# Patient Record
Sex: FEMALE | Race: ASIAN | NOT HISPANIC OR LATINO
[De-identification: names, ages, dates, MRNs, and addresses within clinical notes are randomized per-mention and may not be internally consistent; named-entity substitution may affect disease eponyms.]

---

## 2022-07-12 ENCOUNTER — APPOINTMENT (OUTPATIENT)
Dept: SPINE | Facility: CLINIC | Age: 62
End: 2022-07-12

## 2022-07-12 DIAGNOSIS — M41.20 OTHER IDIOPATHIC SCOLIOSIS, SITE UNSPECIFIED: ICD-10-CM

## 2022-07-12 DIAGNOSIS — M54.12 DISEASE OF SPINAL CORD, UNSPECIFIED: ICD-10-CM

## 2022-07-12 DIAGNOSIS — G95.9 DISEASE OF SPINAL CORD, UNSPECIFIED: ICD-10-CM

## 2022-07-12 DIAGNOSIS — F41.9 ANXIETY DISORDER, UNSPECIFIED: ICD-10-CM

## 2022-07-12 PROBLEM — Z00.00 ENCOUNTER FOR PREVENTIVE HEALTH EXAMINATION: Status: ACTIVE | Noted: 2022-07-12

## 2022-07-12 RX ORDER — KETOCONAZOLE 20 MG/G
2 CREAM TOPICAL
Qty: 60 | Refills: 0 | Status: ACTIVE | COMMUNITY
Start: 2022-06-21

## 2022-07-12 RX ORDER — DICLOFENAC SODIUM 1% 10 MG/G
1 GEL TOPICAL
Qty: 500 | Refills: 0 | Status: ACTIVE | COMMUNITY
Start: 2022-06-13

## 2022-07-12 RX ORDER — EZETIMIBE 10 MG/1
10 TABLET ORAL
Qty: 90 | Refills: 0 | Status: ACTIVE | COMMUNITY
Start: 2022-06-13

## 2022-07-12 RX ORDER — SOLIFENACIN SUCCINATE 5 MG/1
5 TABLET ORAL
Qty: 90 | Refills: 0 | Status: ACTIVE | COMMUNITY
Start: 2022-06-13

## 2022-07-12 RX ORDER — ATORVASTATIN CALCIUM 40 MG/1
40 TABLET, FILM COATED ORAL
Qty: 90 | Refills: 0 | Status: ACTIVE | COMMUNITY
Start: 2022-06-13

## 2022-07-12 RX ORDER — SITAGLIPTIN AND METFORMIN HYDROCHLORIDE 100; 1000 MG/1; MG/1
100-1000 TABLET, FILM COATED, EXTENDED RELEASE ORAL
Qty: 90 | Refills: 0 | Status: ACTIVE | COMMUNITY
Start: 2022-06-13

## 2022-07-12 RX ORDER — HYDROCORTISONE 1 %
12 CREAM (GRAM) TOPICAL
Qty: 400 | Refills: 0 | Status: ACTIVE | COMMUNITY
Start: 2022-06-21

## 2022-07-12 RX ORDER — BRIMONIDINE TARTRATE 1 MG/ML
0.1 SOLUTION/ DROPS OPHTHALMIC
Qty: 15 | Refills: 0 | Status: ACTIVE | COMMUNITY
Start: 2022-06-17

## 2022-07-12 RX ORDER — IRBESARTAN AND HYDROCHLOROTHIAZIDE 300; 12.5 MG/1; MG/1
300-12.5 TABLET ORAL
Qty: 90 | Refills: 0 | Status: ACTIVE | COMMUNITY
Start: 2022-06-13

## 2022-07-12 RX ORDER — DORZOLAMIDE HYDROCHLORIDE 20 MG/ML
2 SOLUTION OPHTHALMIC
Qty: 10 | Refills: 0 | Status: ACTIVE | COMMUNITY
Start: 2022-06-17

## 2022-07-12 RX ORDER — DIAZEPAM 5 MG/1
5 TABLET ORAL
Qty: 4 | Refills: 0 | Status: ACTIVE | COMMUNITY
Start: 2022-07-12 | End: 1900-01-01

## 2022-07-12 RX ORDER — LATANOPROST/PF 0.005 %
0.01 DROPS OPHTHALMIC (EYE)
Qty: 2 | Refills: 0 | Status: ACTIVE | COMMUNITY
Start: 2022-06-17

## 2022-07-12 RX ORDER — AMLODIPINE BESYLATE 5 MG/1
5 TABLET ORAL
Qty: 90 | Refills: 0 | Status: ACTIVE | COMMUNITY
Start: 2022-06-13

## 2022-07-14 DIAGNOSIS — M47.14 OTHER SPONDYLOSIS WITH MYELOPATHY, THORACIC REGION: ICD-10-CM

## 2022-07-29 ENCOUNTER — APPOINTMENT (OUTPATIENT)
Dept: SPINE | Facility: CLINIC | Age: 62
End: 2022-07-29

## 2022-07-29 VITALS
HEART RATE: 68 BPM | TEMPERATURE: 97.8 F | OXYGEN SATURATION: 97 % | DIASTOLIC BLOOD PRESSURE: 81 MMHG | SYSTOLIC BLOOD PRESSURE: 115 MMHG

## 2022-07-29 DIAGNOSIS — M85.80 OTHER SPECIFIED DISORDERS OF BONE DENSITY AND STRUCTURE, UNSPECIFIED SITE: ICD-10-CM

## 2022-07-29 DIAGNOSIS — M81.8 OTHER OSTEOPOROSIS W/OUT CURRENT PATHOLOGICAL FRACTURE: ICD-10-CM

## 2022-07-29 DIAGNOSIS — Q76.2 CONGENITAL SPONDYLOLISTHESIS: ICD-10-CM

## 2022-07-29 DIAGNOSIS — M47.817 SPONDYLOSIS W/OUT MYELOPATHY OR RADICULOPATHY, LUMBOSACRAL REGION: ICD-10-CM

## 2022-07-29 PROCEDURE — 99205 OFFICE O/P NEW HI 60 MIN: CPT

## 2022-07-31 PROBLEM — M47.817 LUMBOSACRAL SPONDYLOSIS: Status: ACTIVE | Noted: 2022-07-31

## 2022-07-31 PROBLEM — Q76.2 CONGENITAL SPONDYLOLISTHESIS: Status: ACTIVE | Noted: 2022-07-31

## 2022-07-31 NOTE — HISTORY OF PRESENT ILLNESS
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  She has a longstanding history of right lower extremity weakness due to childhood illness which is suspicious for polio.  She recently has had severe back and leg pain as well as many recent falls.

## 2022-07-31 NOTE — REASON FOR VISIT
[Initial Visit] : an initial visit for [Radiculopathy] : radiculopathy [Sciatica] : sciatica [Spouse] : spouse [Other: ______] : provided by JOHNATHON

## 2022-07-31 NOTE — DISCUSSION/SUMMARY
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  We discussed the risk benefits and alternatives to surgery and they wish to proceed.  We will obtain further work-up and then follow-up with them once this is complete.\par \par Jhon Manzanares M.D., M.Sc.\par \[facility] Department of Neurosurgery\[facility]\par [facility]\par [facility]\par [place], [place]\par [email or web address]\par [phone]

## 2022-08-06 PROBLEM — M81.8 OSTEOPOROSIS, IDIOPATHIC: Status: ACTIVE | Noted: 2022-08-06

## 2022-08-08 RX ORDER — DIAZEPAM 5 MG/1
5 TABLET ORAL
Qty: 30 | Refills: 0 | Status: ACTIVE | COMMUNITY
Start: 2022-08-08 | End: 1900-01-01

## 2022-08-16 ENCOUNTER — APPOINTMENT (OUTPATIENT)
Dept: SPINE | Facility: CLINIC | Age: 62
End: 2022-08-16

## 2022-08-16 DIAGNOSIS — M47.812 SPONDYLOSIS W/OUT MYELOPATHY OR RADICULOPATHY, CERVICAL REGION: ICD-10-CM

## 2022-08-16 DIAGNOSIS — G83.4 CAUDA EQUINA SYNDROME: ICD-10-CM

## 2022-08-16 PROCEDURE — 99214 OFFICE O/P EST MOD 30 MIN: CPT | Mod: 95

## 2022-08-18 PROBLEM — G83.4 CAUDA EQUINA SYNDROME: Status: ACTIVE | Noted: 2022-08-18

## 2022-08-24 DIAGNOSIS — Z01.818 ENCOUNTER FOR OTHER PREPROCEDURAL EXAMINATION: ICD-10-CM

## 2022-08-30 PROBLEM — M47.812 CERVICAL SPONDYLOSIS: Status: ACTIVE | Noted: 2022-08-30

## 2022-08-30 NOTE — REASON FOR VISIT
[Home] : at home, [unfilled] , at the time of the visit. [place] [place] : at the medical office located in  [Spouse] : spouse [Patient] : the patient [Self] : self [Follow-Up Visit] : a follow-up visit for

## 2022-08-30 NOTE — HISTORY OF PRESENT ILLNESS
[de-identified] : Patient returns today after completing her cervical MRI she has had no change in her symptoms and wishes to proceed with lumbar surgery

## 2022-08-30 NOTE — DISCUSSION/SUMMARY
[de-identified] : She wishes to proceed with lumbar surgery and I recommend an anterior lumbar interbody fusion she will pick a date and contact us for scheduling.\par \par I answered all of her questions to the best my ability.\par \par Jhon Manzanares M.D., M.Sc.\par \par Department of Neurosurgery\par [facility]\par [facility]\par [facility]\par [place], [place]\par [email or web address]\par [phone]

## 2022-08-31 ENCOUNTER — RESULT REVIEW (OUTPATIENT)
Age: 62
End: 2022-08-31

## 2022-08-31 ENCOUNTER — APPOINTMENT (OUTPATIENT)
Dept: CT IMAGING | Facility: CLINIC | Age: 62
End: 2022-08-31

## 2022-08-31 ENCOUNTER — OUTPATIENT (OUTPATIENT)
Dept: OUTPATIENT SERVICES | Facility: HOSPITAL | Age: 62
LOS: 1 days | End: 2022-08-31
Payer: COMMERCIAL

## 2022-08-31 ENCOUNTER — APPOINTMENT (OUTPATIENT)
Dept: CT IMAGING | Facility: HOSPITAL | Age: 62
End: 2022-08-31

## 2022-08-31 DIAGNOSIS — M41.20 OTHER IDIOPATHIC SCOLIOSIS, SITE UNSPECIFIED: ICD-10-CM

## 2022-08-31 DIAGNOSIS — G83.4 CAUDA EQUINA SYNDROME: ICD-10-CM

## 2022-08-31 DIAGNOSIS — M43.16 SPONDYLOLISTHESIS, LUMBAR REGION: ICD-10-CM

## 2022-08-31 DIAGNOSIS — M51.36 OTHER INTERVERTEBRAL DISC DEGENERATION, LUMBAR REGION: ICD-10-CM

## 2022-08-31 PROCEDURE — 72131 CT LUMBAR SPINE W/O DYE: CPT

## 2022-08-31 PROCEDURE — 76376 3D RENDER W/INTRP POSTPROCES: CPT | Mod: 26

## 2022-08-31 PROCEDURE — 76376 3D RENDER W/INTRP POSTPROCES: CPT

## 2022-08-31 PROCEDURE — 72131 CT LUMBAR SPINE W/O DYE: CPT | Mod: 26

## 2022-10-02 RX ORDER — DIAZEPAM 5 MG/1
5 TABLET ORAL
Qty: 42 | Refills: 0 | Status: ACTIVE | COMMUNITY
Start: 2022-10-02 | End: 1900-01-01

## 2022-10-17 RX ORDER — DIAZEPAM 2 MG/1
2 TABLET ORAL
Qty: 14 | Refills: 0 | Status: ACTIVE | COMMUNITY
Start: 2022-10-17 | End: 1900-01-01

## 2022-10-25 ENCOUNTER — TRANSCRIPTION ENCOUNTER (OUTPATIENT)
Age: 62
End: 2022-10-25

## 2022-10-25 VITALS
SYSTOLIC BLOOD PRESSURE: 111 MMHG | RESPIRATION RATE: 16 BRPM | HEART RATE: 65 BPM | HEIGHT: 56 IN | TEMPERATURE: 98 F | OXYGEN SATURATION: 96 % | WEIGHT: 115.52 LBS | DIASTOLIC BLOOD PRESSURE: 73 MMHG

## 2022-10-25 NOTE — PATIENT PROFILE ADULT - FALL HARM RISK - HARM RISK INTERVENTIONS

## 2022-10-25 NOTE — PATIENT PROFILE ADULT - FUNCTIONAL ASSESSMENT - BASIC MOBILITY SECTION LABEL
Ashley Garces is here today for Follow-up    Concerns/symptoms: follow up  Medications: medications verified and updated  Refills needed today? No     Tobacco history: verified  Advanced Directives: No not on file, needs to bring in a copy.  BP greater than 140/90? No    Patient would like communication of their results via:      Cell Phone:   Telephone Information:   Mobile [phone]     Okay to leave a message containing results? Yes  Preferred language:  English.    Health Maintenance Due   Topic Date Due   • Shingles Vaccine (1 of 2) Never done   • Influenza Vaccine (1) 08/01/2020      Patient is due for the topics as listed above and wishes to proceed with them.    Medicare HRA:              PHQ 2:  Date Adult PHQ 2 Score   9/25/2019 0       PHQ 9:        .

## 2022-10-26 ENCOUNTER — APPOINTMENT (OUTPATIENT)
Dept: SPINE | Facility: HOSPITAL | Age: 62
End: 2022-10-26

## 2022-10-26 ENCOUNTER — INPATIENT (INPATIENT)
Facility: HOSPITAL | Age: 62
LOS: 8 days | Discharge: NURSING, REHAB OR LONG-TERM CARE | DRG: 454 | End: 2022-11-04
Attending: NEUROLOGICAL SURGERY | Admitting: NEUROLOGICAL SURGERY
Payer: MEDICARE

## 2022-10-26 ENCOUNTER — TRANSCRIPTION ENCOUNTER (OUTPATIENT)
Age: 62
End: 2022-10-26

## 2022-10-26 DIAGNOSIS — Z98.890 OTHER SPECIFIED POSTPROCEDURAL STATES: Chronic | ICD-10-CM

## 2022-10-26 LAB
ANION GAP SERPL CALC-SCNC: 11 MMOL/L — SIGNIFICANT CHANGE UP (ref 5–17)
BASE EXCESS BLDA CALC-SCNC: -2 MMOL/L — SIGNIFICANT CHANGE UP (ref -2–3)
BASOPHILS # BLD AUTO: 0.01 K/UL — SIGNIFICANT CHANGE UP (ref 0–0.2)
BASOPHILS NFR BLD AUTO: 0.1 % — SIGNIFICANT CHANGE UP (ref 0–2)
BLD GP AB SCN SERPL QL: NEGATIVE — SIGNIFICANT CHANGE UP
BUN SERPL-MCNC: 10 MG/DL — SIGNIFICANT CHANGE UP (ref 7–23)
CA-I BLDA-SCNC: 1.14 MMOL/L — LOW (ref 1.15–1.33)
CALCIUM SERPL-MCNC: 8 MG/DL — LOW (ref 8.4–10.5)
CHLORIDE SERPL-SCNC: 104 MMOL/L — SIGNIFICANT CHANGE UP (ref 96–108)
CO2 BLDA-SCNC: 23 MMOL/L — SIGNIFICANT CHANGE UP (ref 19–24)
CO2 SERPL-SCNC: 23 MMOL/L — SIGNIFICANT CHANGE UP (ref 22–31)
COHGB MFR BLDA: 0 % — SIGNIFICANT CHANGE UP
CREAT SERPL-MCNC: 0.2 MG/DL — LOW (ref 0.5–1.3)
EGFR: 134 ML/MIN/1.73M2 — SIGNIFICANT CHANGE UP
EOSINOPHIL # BLD AUTO: 0.01 K/UL — SIGNIFICANT CHANGE UP (ref 0–0.5)
EOSINOPHIL NFR BLD AUTO: 0.1 % — SIGNIFICANT CHANGE UP (ref 0–6)
GLUCOSE BLDA-MCNC: 191 MG/DL — HIGH (ref 70–99)
GLUCOSE BLDC GLUCOMTR-MCNC: 140 MG/DL — HIGH (ref 70–99)
GLUCOSE BLDC GLUCOMTR-MCNC: 227 MG/DL — HIGH (ref 70–99)
GLUCOSE SERPL-MCNC: 232 MG/DL — HIGH (ref 70–99)
HCO3 BLDA-SCNC: 22 MMOL/L — SIGNIFICANT CHANGE UP (ref 21–28)
HCT VFR BLD CALC: 23.6 % — LOW (ref 34.5–45)
HGB BLD-MCNC: 8.2 G/DL — LOW (ref 11.5–15.5)
HGB BLDA-MCNC: 10.3 G/DL — LOW (ref 11.7–16.1)
IMM GRANULOCYTES NFR BLD AUTO: 0.4 % — SIGNIFICANT CHANGE UP (ref 0–0.9)
LYMPHOCYTES # BLD AUTO: 0.76 K/UL — LOW (ref 1–3.3)
LYMPHOCYTES # BLD AUTO: 7.4 % — LOW (ref 13–44)
MCHC RBC-ENTMCNC: 30 PG — SIGNIFICANT CHANGE UP (ref 27–34)
MCHC RBC-ENTMCNC: 34.7 GM/DL — SIGNIFICANT CHANGE UP (ref 32–36)
MCV RBC AUTO: 86.4 FL — SIGNIFICANT CHANGE UP (ref 80–100)
METHGB MFR BLDA: 0 % — SIGNIFICANT CHANGE UP
MONOCYTES # BLD AUTO: 0.59 K/UL — SIGNIFICANT CHANGE UP (ref 0–0.9)
MONOCYTES NFR BLD AUTO: 5.8 % — SIGNIFICANT CHANGE UP (ref 2–14)
NEUTROPHILS # BLD AUTO: 8.83 K/UL — HIGH (ref 1.8–7.4)
NEUTROPHILS NFR BLD AUTO: 86.2 % — HIGH (ref 43–77)
NRBC # BLD: 0 /100 WBCS — SIGNIFICANT CHANGE UP (ref 0–0)
OXYHGB MFR BLDA: 97.6 % — HIGH (ref 90–95)
PCO2 BLDA: 35 MMHG — SIGNIFICANT CHANGE UP (ref 32–45)
PH BLDA: 7.41 — SIGNIFICANT CHANGE UP (ref 7.35–7.45)
PLATELET # BLD AUTO: 223 K/UL — SIGNIFICANT CHANGE UP (ref 150–400)
PO2 BLDA: 343 MMHG — HIGH (ref 83–108)
POTASSIUM BLDA-SCNC: 3.7 MMOL/L — SIGNIFICANT CHANGE UP (ref 3.5–5.1)
POTASSIUM SERPL-MCNC: 3.7 MMOL/L — SIGNIFICANT CHANGE UP (ref 3.5–5.3)
POTASSIUM SERPL-SCNC: 3.7 MMOL/L — SIGNIFICANT CHANGE UP (ref 3.5–5.3)
RBC # BLD: 2.73 M/UL — LOW (ref 3.8–5.2)
RBC # FLD: 12.7 % — SIGNIFICANT CHANGE UP (ref 10.3–14.5)
RH IG SCN BLD-IMP: POSITIVE — SIGNIFICANT CHANGE UP
SAO2 % BLDA: 97.6 % — SIGNIFICANT CHANGE UP (ref 94–98)
SODIUM BLDA-SCNC: 134 MMOL/L — LOW (ref 136–145)
SODIUM SERPL-SCNC: 138 MMOL/L — SIGNIFICANT CHANGE UP (ref 135–145)
WBC # BLD: 10.24 K/UL — SIGNIFICANT CHANGE UP (ref 3.8–10.5)
WBC # FLD AUTO: 10.24 K/UL — SIGNIFICANT CHANGE UP (ref 3.8–10.5)

## 2022-10-26 PROCEDURE — 22614 ARTHRD PST TQ 1NTRSPC EA ADD: CPT

## 2022-10-26 PROCEDURE — 63012 REMOVE LAMINA/FACETS LUMBAR: CPT | Mod: 59

## 2022-10-26 PROCEDURE — 22848 INSERT PELV FIXATION DEVICE: CPT

## 2022-10-26 PROCEDURE — 22842 INSERT SPINE FIXATION DEVICE: CPT

## 2022-10-26 PROCEDURE — 61783 SCAN PROC SPINAL: CPT | Mod: 59

## 2022-10-26 PROCEDURE — 22633 ARTHRD CMBN 1NTRSPC LUMBAR: CPT

## 2022-10-26 PROCEDURE — 20939 BONE MARROW ASPIR BONE GRFG: CPT

## 2022-10-26 PROCEDURE — 22853 INSJ BIOMECHANICAL DEVICE: CPT

## 2022-10-26 DEVICE — SCREW EVEREST POLY 6.5X35MM: Type: IMPLANTABLE DEVICE | Status: FUNCTIONAL

## 2022-10-26 DEVICE — SET SCREW EVEREST CUSTOM: Type: IMPLANTABLE DEVICE | Status: FUNCTIONAL

## 2022-10-26 DEVICE — SCREW EVEREST POLY 6.5X40MM: Type: IMPLANTABLE DEVICE | Status: FUNCTIONAL

## 2022-10-26 DEVICE — IMPLANTABLE DEVICE: Type: IMPLANTABLE DEVICE | Status: FUNCTIONAL

## 2022-10-26 DEVICE — DURASEAL: Type: IMPLANTABLE DEVICE | Status: FUNCTIONAL

## 2022-10-26 DEVICE — SURGIFLO HEMOSTATIC MATRIX KIT: Type: IMPLANTABLE DEVICE | Status: FUNCTIONAL

## 2022-10-26 DEVICE — GRAFT BONE INFUSE KIT MED: Type: IMPLANTABLE DEVICE | Status: FUNCTIONAL

## 2022-10-26 DEVICE — ARTHREX GUIDE PIN W SUTURE EYE 2.4MM: Type: IMPLANTABLE DEVICE | Status: FUNCTIONAL

## 2022-10-26 DEVICE — SURGIFOAM PAD 8CM X 12.5CM X 10MM (100): Type: IMPLANTABLE DEVICE | Status: FUNCTIONAL

## 2022-10-26 DEVICE — GRAFT BONE INFUSE KIT XS: Type: IMPLANTABLE DEVICE | Status: FUNCTIONAL

## 2022-10-26 RX ORDER — CEFAZOLIN SODIUM 1 G
2000 VIAL (EA) INJECTION EVERY 8 HOURS
Refills: 0 | Status: COMPLETED | OUTPATIENT
Start: 2022-10-26 | End: 2022-10-27

## 2022-10-26 RX ORDER — AMLODIPINE BESYLATE 2.5 MG/1
5 TABLET ORAL AT BEDTIME
Refills: 0 | Status: DISCONTINUED | OUTPATIENT
Start: 2022-10-26 | End: 2022-10-26

## 2022-10-26 RX ORDER — DORZOLAMIDE HYDROCHLORIDE 20 MG/ML
1 SOLUTION/ DROPS OPHTHALMIC ONCE
Refills: 0 | Status: DISCONTINUED | OUTPATIENT
Start: 2022-10-26 | End: 2022-10-27

## 2022-10-26 RX ORDER — ATORVASTATIN CALCIUM 80 MG/1
40 TABLET, FILM COATED ORAL DAILY
Refills: 0 | Status: DISCONTINUED | OUTPATIENT
Start: 2022-10-26 | End: 2022-10-26

## 2022-10-26 RX ORDER — LOSARTAN POTASSIUM 100 MG/1
100 TABLET, FILM COATED ORAL DAILY
Refills: 0 | Status: DISCONTINUED | OUTPATIENT
Start: 2022-10-26 | End: 2022-10-27

## 2022-10-26 RX ORDER — ACETAMINOPHEN 500 MG
1000 TABLET ORAL EVERY 8 HOURS
Refills: 0 | Status: DISCONTINUED | OUTPATIENT
Start: 2022-10-26 | End: 2022-11-04

## 2022-10-26 RX ORDER — ACETAMINOPHEN 500 MG
1000 TABLET ORAL ONCE
Refills: 0 | Status: COMPLETED | OUTPATIENT
Start: 2022-10-26 | End: 2022-10-26

## 2022-10-26 RX ORDER — SODIUM CHLORIDE 9 MG/ML
1000 INJECTION INTRAMUSCULAR; INTRAVENOUS; SUBCUTANEOUS
Refills: 0 | Status: DISCONTINUED | OUTPATIENT
Start: 2022-10-26 | End: 2022-10-27

## 2022-10-26 RX ORDER — EZETIMIBE 10 MG/1
1 TABLET ORAL
Qty: 0 | Refills: 0 | DISCHARGE

## 2022-10-26 RX ORDER — AMLODIPINE BESYLATE 2.5 MG/1
5 TABLET ORAL AT BEDTIME
Refills: 0 | Status: DISCONTINUED | OUTPATIENT
Start: 2022-10-26 | End: 2022-10-27

## 2022-10-26 RX ORDER — ONDANSETRON 8 MG/1
4 TABLET, FILM COATED ORAL EVERY 6 HOURS
Refills: 0 | Status: DISCONTINUED | OUTPATIENT
Start: 2022-10-26 | End: 2022-11-04

## 2022-10-26 RX ORDER — CHOLECALCIFEROL (VITAMIN D3) 125 MCG
1 CAPSULE ORAL
Qty: 0 | Refills: 0 | DISCHARGE

## 2022-10-26 RX ORDER — INSULIN LISPRO 100/ML
VIAL (ML) SUBCUTANEOUS
Refills: 0 | Status: DISCONTINUED | OUTPATIENT
Start: 2022-10-26 | End: 2022-11-04

## 2022-10-26 RX ORDER — LATANOPROST 0.05 MG/ML
1 SOLUTION/ DROPS OPHTHALMIC; TOPICAL AT BEDTIME
Refills: 0 | Status: DISCONTINUED | OUTPATIENT
Start: 2022-10-26 | End: 2022-11-04

## 2022-10-26 RX ORDER — SOLIFENACIN SUCCINATE 10 MG/1
1 TABLET ORAL
Qty: 0 | Refills: 0 | DISCHARGE

## 2022-10-26 RX ORDER — DEXTROSE 50 % IN WATER 50 %
25 SYRINGE (ML) INTRAVENOUS ONCE
Refills: 0 | Status: DISCONTINUED | OUTPATIENT
Start: 2022-10-26 | End: 2022-10-28

## 2022-10-26 RX ORDER — OXYBUTYNIN CHLORIDE 5 MG
5 TABLET ORAL
Refills: 0 | Status: DISCONTINUED | OUTPATIENT
Start: 2022-10-26 | End: 2022-10-26

## 2022-10-26 RX ORDER — BRIMONIDINE TARTRATE 2 MG/MG
1 SOLUTION/ DROPS OPHTHALMIC DAILY
Refills: 0 | Status: DISCONTINUED | OUTPATIENT
Start: 2022-10-26 | End: 2022-10-26

## 2022-10-26 RX ORDER — POVIDONE-IODINE 5 %
1 AEROSOL (ML) TOPICAL ONCE
Refills: 0 | Status: COMPLETED | OUTPATIENT
Start: 2022-10-26 | End: 2022-10-26

## 2022-10-26 RX ORDER — OXYBUTYNIN CHLORIDE 5 MG
5 TABLET ORAL
Refills: 0 | Status: DISCONTINUED | OUTPATIENT
Start: 2022-10-26 | End: 2022-11-04

## 2022-10-26 RX ORDER — DEXAMETHASONE 0.5 MG/5ML
4 ELIXIR ORAL EVERY 6 HOURS
Refills: 0 | Status: COMPLETED | OUTPATIENT
Start: 2022-10-26 | End: 2022-10-29

## 2022-10-26 RX ORDER — SODIUM CHLORIDE 9 MG/ML
1000 INJECTION, SOLUTION INTRAVENOUS
Refills: 0 | Status: DISCONTINUED | OUTPATIENT
Start: 2022-10-26 | End: 2022-10-28

## 2022-10-26 RX ORDER — CHLORHEXIDINE GLUCONATE 213 G/1000ML
1 SOLUTION TOPICAL EVERY 12 HOURS
Refills: 0 | Status: DISCONTINUED | OUTPATIENT
Start: 2022-10-26 | End: 2022-10-26

## 2022-10-26 RX ORDER — FAMOTIDINE 10 MG/ML
20 INJECTION INTRAVENOUS EVERY 12 HOURS
Refills: 0 | Status: DISCONTINUED | OUTPATIENT
Start: 2022-10-26 | End: 2022-11-04

## 2022-10-26 RX ORDER — HYDROCHLOROTHIAZIDE 25 MG
12.5 TABLET ORAL DAILY
Refills: 0 | Status: DISCONTINUED | OUTPATIENT
Start: 2022-10-26 | End: 2022-10-27

## 2022-10-26 RX ORDER — ATORVASTATIN CALCIUM 80 MG/1
1 TABLET, FILM COATED ORAL
Qty: 0 | Refills: 0 | DISCHARGE

## 2022-10-26 RX ORDER — CYCLOBENZAPRINE HYDROCHLORIDE 10 MG/1
10 TABLET, FILM COATED ORAL EVERY 8 HOURS
Refills: 0 | Status: DISCONTINUED | OUTPATIENT
Start: 2022-10-26 | End: 2022-10-26

## 2022-10-26 RX ORDER — ATORVASTATIN CALCIUM 80 MG/1
40 TABLET, FILM COATED ORAL DAILY
Refills: 0 | Status: DISCONTINUED | OUTPATIENT
Start: 2022-10-26 | End: 2022-10-27

## 2022-10-26 RX ORDER — GLUCAGON INJECTION, SOLUTION 0.5 MG/.1ML
1 INJECTION, SOLUTION SUBCUTANEOUS ONCE
Refills: 0 | Status: DISCONTINUED | OUTPATIENT
Start: 2022-10-26 | End: 2022-10-27

## 2022-10-26 RX ORDER — METHOCARBAMOL 500 MG/1
500 TABLET, FILM COATED ORAL EVERY 8 HOURS
Refills: 0 | Status: DISCONTINUED | OUTPATIENT
Start: 2022-10-26 | End: 2022-10-31

## 2022-10-26 RX ORDER — HYDROCHLOROTHIAZIDE 25 MG
12.5 TABLET ORAL DAILY
Refills: 0 | Status: DISCONTINUED | OUTPATIENT
Start: 2022-10-26 | End: 2022-10-26

## 2022-10-26 RX ORDER — APREPITANT 80 MG/1
40 CAPSULE ORAL ONCE
Refills: 0 | Status: COMPLETED | OUTPATIENT
Start: 2022-10-26 | End: 2022-10-26

## 2022-10-26 RX ORDER — LATANOPROST 0.05 MG/ML
1 SOLUTION/ DROPS OPHTHALMIC; TOPICAL AT BEDTIME
Refills: 0 | Status: DISCONTINUED | OUTPATIENT
Start: 2022-10-26 | End: 2022-10-26

## 2022-10-26 RX ORDER — SODIUM CHLORIDE 9 MG/ML
500 INJECTION INTRAMUSCULAR; INTRAVENOUS; SUBCUTANEOUS ONCE
Refills: 0 | Status: COMPLETED | OUTPATIENT
Start: 2022-10-26 | End: 2022-10-26

## 2022-10-26 RX ORDER — DORZOLAMIDE HYDROCHLORIDE 20 MG/ML
1 SOLUTION/ DROPS OPHTHALMIC ONCE
Refills: 0 | Status: DISCONTINUED | OUTPATIENT
Start: 2022-10-26 | End: 2022-10-26

## 2022-10-26 RX ORDER — SENNA PLUS 8.6 MG/1
2 TABLET ORAL AT BEDTIME
Refills: 0 | Status: DISCONTINUED | OUTPATIENT
Start: 2022-10-26 | End: 2022-11-04

## 2022-10-26 RX ORDER — OXYCODONE HYDROCHLORIDE 5 MG/1
5 TABLET ORAL EVERY 4 HOURS
Refills: 0 | Status: DISCONTINUED | OUTPATIENT
Start: 2022-10-26 | End: 2022-10-28

## 2022-10-26 RX ORDER — GABAPENTIN 400 MG/1
300 CAPSULE ORAL ONCE
Refills: 0 | Status: COMPLETED | OUTPATIENT
Start: 2022-10-26 | End: 2022-10-26

## 2022-10-26 RX ORDER — BRIMONIDINE TARTRATE 2 MG/MG
1 SOLUTION/ DROPS OPHTHALMIC DAILY
Refills: 0 | Status: DISCONTINUED | OUTPATIENT
Start: 2022-10-26 | End: 2022-10-27

## 2022-10-26 RX ORDER — CELECOXIB 200 MG/1
200 CAPSULE ORAL ONCE
Refills: 0 | Status: COMPLETED | OUTPATIENT
Start: 2022-10-26 | End: 2022-10-26

## 2022-10-26 RX ORDER — LOSARTAN POTASSIUM 100 MG/1
100 TABLET, FILM COATED ORAL DAILY
Refills: 0 | Status: DISCONTINUED | OUTPATIENT
Start: 2022-10-26 | End: 2022-10-26

## 2022-10-26 RX ORDER — DEXTROSE 50 % IN WATER 50 %
15 SYRINGE (ML) INTRAVENOUS ONCE
Refills: 0 | Status: DISCONTINUED | OUTPATIENT
Start: 2022-10-26 | End: 2022-10-28

## 2022-10-26 RX ADMIN — CHLORHEXIDINE GLUCONATE 1 APPLICATION(S): 213 SOLUTION TOPICAL at 07:13

## 2022-10-26 RX ADMIN — APREPITANT 40 MILLIGRAM(S): 80 CAPSULE ORAL at 07:24

## 2022-10-26 RX ADMIN — METHOCARBAMOL 500 MILLIGRAM(S): 500 TABLET, FILM COATED ORAL at 22:00

## 2022-10-26 RX ADMIN — GABAPENTIN 300 MILLIGRAM(S): 400 CAPSULE ORAL at 07:24

## 2022-10-26 RX ADMIN — Medication 4 MILLIGRAM(S): at 18:06

## 2022-10-26 RX ADMIN — Medication 50 MILLIGRAM(S): at 22:00

## 2022-10-26 RX ADMIN — Medication 1000 MILLIGRAM(S): at 07:24

## 2022-10-26 RX ADMIN — Medication 100 MILLIGRAM(S): at 20:33

## 2022-10-26 RX ADMIN — SENNA PLUS 2 TABLET(S): 8.6 TABLET ORAL at 22:00

## 2022-10-26 RX ADMIN — ATORVASTATIN CALCIUM 40 MILLIGRAM(S): 80 TABLET, FILM COATED ORAL at 22:00

## 2022-10-26 RX ADMIN — Medication 1000 MILLIGRAM(S): at 22:00

## 2022-10-26 RX ADMIN — Medication 1000 MILLIGRAM(S): at 23:00

## 2022-10-26 RX ADMIN — SODIUM CHLORIDE 75 MILLILITER(S): 9 INJECTION INTRAMUSCULAR; INTRAVENOUS; SUBCUTANEOUS at 19:00

## 2022-10-26 RX ADMIN — CELECOXIB 200 MILLIGRAM(S): 200 CAPSULE ORAL at 07:25

## 2022-10-26 RX ADMIN — SODIUM CHLORIDE 500 MILLILITER(S): 9 INJECTION INTRAMUSCULAR; INTRAVENOUS; SUBCUTANEOUS at 18:48

## 2022-10-26 RX ADMIN — Medication 1 APPLICATION(S): at 07:13

## 2022-10-26 NOTE — PROGRESS NOTE ADULT - SUBJECTIVE AND OBJECTIVE BOX
NEUROSURGERY POST OP NOTE:    POD# 0 S/P L5-S1 alek laminectomy, L5-S1 TLIF, L4-S2/AI fusion    S: No complications. Postop patient c/o incisional pain and dizziness post-anesthesia.     Yoruba speaking: translation by En Khan RN at bedside.    T(C): 36.6 (10-26-22 @ 16:04), Max: 36.6 (10-26-22 @ 16:04)  HR: 77 (10-26-22 @ 17:04) (74 - 96)  BP: 100/67 (10-26-22 @ 17:04) (77/60 - 106/70)  RR: 20 (10-26-22 @ 17:04) (20 - 30)  SpO2: 99% (10-26-22 @ 17:04) (99% - 100%)      10-26-22 @ 07:01  -  10-26-22 @ 17:12  --------------------------------------------------------  IN: 75 mL / OUT: 125 mL / NET: -50 mL        acetaminophen     Tablet .. 1000 milliGRAM(s) Oral every 8 hours  amLODIPine   Tablet 5 milliGRAM(s) Oral at bedtime  atorvastatin Oral Tab/Cap - Peds 40 milliGRAM(s) Oral daily  bisacodyl 5 milliGRAM(s) Oral every 12 hours PRN  brimonidine 0.2% Ophthalmic Solution 1 Drop(s) Both EYES daily  ceFAZolin   IVPB 2000 milliGRAM(s) IV Intermittent every 8 hours  dexAMETHasone  Injectable 4 milliGRAM(s) IV Push every 6 hours  dextrose 5%. 1000 milliLiter(s) IV Continuous <Continuous>  dextrose 50% Injectable 25 Gram(s) IV Push once  dextrose Oral Gel 15 Gram(s) Oral once PRN  dorzolamide 2% Ophthalmic Solution - Peds 1 Drop(s) Both EYES once  famotidine    Tablet 20 milliGRAM(s) Oral every 12 hours PRN  glucagon  Injectable 1 milliGRAM(s) IntraMuscular once  hydrochlorothiazide 12.5 milliGRAM(s) Oral daily  insulin lispro (ADMELOG) corrective regimen sliding scale   SubCutaneous three times a day before meals  latanoprost 0.005% Ophthalmic Solution 1 Drop(s) Both EYES at bedtime  losartan 100 milliGRAM(s) Oral daily  methocarbamol 500 milliGRAM(s) Oral every 8 hours  ondansetron   Disintegrating Tablet 4 milliGRAM(s) Oral every 6 hours  oxybutynin 5 milliGRAM(s) Oral two times a day  oxyCODONE    IR 5 milliGRAM(s) Oral every 4 hours PRN  pregabalin 50 milliGRAM(s) Oral three times a day  senna 2 Tablet(s) Oral at bedtime  sodium chloride 0.9%. 1000 milliLiter(s) IV Continuous <Continuous>      RADIOLOGY:     Exam:  General: patient seen laying supine in bed in NAD  Neuro: AAOx3, follows commands, opens eyes spontaneously, speech clear and fluent, face symmetric, b/l upper extremities strength 5/5, b/l lower extremities strength 2/5 HF, 3/5 KF/KE, DF/PF/EHL 5/5. sensation intact to light touch throughout  HEENT: PERRL, EOMI  Neck: supple  Cardiac: RRR, S1S2  Pulmonary: chest rise symmetric  Abdomen: soft, nontender, nondistended  Ext: perfusing well  Skin: warm, dry  Wound: lumbar incision dressing c/d/i, deep HMV x 2, superficial ANA LUISA x 1          Assessment: 62y F with pmh polio with chronic b/l lower extremity weakness, DM, asthma, and HLD now s/p L5-S1 orellana laminectomy, L5-S1 TLIF, L4-S2/AI fusion (10/26).      Plan:  Neuro:  - neuro/vital checks q1  - pain control, ERAS  - pending standing AP/lateral xrays postop  - decadron 4q6 x 3 days  - monitor HMV x 2 and ANA LUISA x 1 drain outputs    Cardiac:  - normotensive BP goal    Pulmonary:  - on RA    GI:  - ADAT  - bowel regimen    Renal:  - NS at 75 until adequate PO intake    Heme:  - SCDs for DVT prophylaxis    Endo:  - ISS    ID:          Assessment:  Present when checked    []  GCS  E   V  M     Heart Failure: []Acute, [] acute on chronic , []chronic  Heart Failure:  [] Diastolic (HFpEF), [] Systolic (HFrEF), []Combined (HFpEF and HFrEF), [] RHF, [] Pulm HTN, [] Other    [] DERICK, [] ATN, [] AIN, [] other  [] CKD1, [] CKD2, [] CKD 3, [] CKD 4, [] CKD 5, []ESRD    Encephalopathy: [] Metabolic, [] Hepatic, [] toxic, [] Neurological, [] Other    Abnormal Nurtitional Status: [] malnurtition (see nutrition note), [ ]underweight: BMI < 19, [] morbid obesity: BMI >40, [] Cachexia    [] Sepsis  [] hypovolemic shock,[] cardiogenic shock, [] hemorrhagic shock, [] neuogenic shock  [] Acute Respiratory Failure  []Cerebral edema, [] Brain compression/ herniation,   [] Functional quadriplegia  [] Acute blood loss anemia       NEUROSURGERY POST OP NOTE:    POD# 0 S/P L5-S1 alek laminectomy, L5-S1 TLIF, L4-S2/AI fusion    S: No complications. Postop patient c/o incisional pain and dizziness post-anesthesia.     Slovak speaking: translation by En Khan RN at bedside.    T(C): 36.6 (10-26-22 @ 16:04), Max: 36.6 (10-26-22 @ 16:04)  HR: 77 (10-26-22 @ 17:04) (74 - 96)  BP: 100/67 (10-26-22 @ 17:04) (77/60 - 106/70)  RR: 20 (10-26-22 @ 17:04) (20 - 30)  SpO2: 99% (10-26-22 @ 17:04) (99% - 100%)      10-26-22 @ 07:01  -  10-26-22 @ 17:12  --------------------------------------------------------  IN: 75 mL / OUT: 125 mL / NET: -50 mL        acetaminophen     Tablet .. 1000 milliGRAM(s) Oral every 8 hours  amLODIPine   Tablet 5 milliGRAM(s) Oral at bedtime  atorvastatin Oral Tab/Cap - Peds 40 milliGRAM(s) Oral daily  bisacodyl 5 milliGRAM(s) Oral every 12 hours PRN  brimonidine 0.2% Ophthalmic Solution 1 Drop(s) Both EYES daily  ceFAZolin   IVPB 2000 milliGRAM(s) IV Intermittent every 8 hours  dexAMETHasone  Injectable 4 milliGRAM(s) IV Push every 6 hours  dextrose 5%. 1000 milliLiter(s) IV Continuous <Continuous>  dextrose 50% Injectable 25 Gram(s) IV Push once  dextrose Oral Gel 15 Gram(s) Oral once PRN  dorzolamide 2% Ophthalmic Solution - Peds 1 Drop(s) Both EYES once  famotidine    Tablet 20 milliGRAM(s) Oral every 12 hours PRN  glucagon  Injectable 1 milliGRAM(s) IntraMuscular once  hydrochlorothiazide 12.5 milliGRAM(s) Oral daily  insulin lispro (ADMELOG) corrective regimen sliding scale   SubCutaneous three times a day before meals  latanoprost 0.005% Ophthalmic Solution 1 Drop(s) Both EYES at bedtime  losartan 100 milliGRAM(s) Oral daily  methocarbamol 500 milliGRAM(s) Oral every 8 hours  ondansetron   Disintegrating Tablet 4 milliGRAM(s) Oral every 6 hours  oxybutynin 5 milliGRAM(s) Oral two times a day  oxyCODONE    IR 5 milliGRAM(s) Oral every 4 hours PRN  pregabalin 50 milliGRAM(s) Oral three times a day  senna 2 Tablet(s) Oral at bedtime  sodium chloride 0.9%. 1000 milliLiter(s) IV Continuous <Continuous>      RADIOLOGY:     Exam:  General: patient seen laying supine in bed in NAD  Neuro: AAOx3, follows commands, opens eyes spontaneously, speech clear and fluent, face symmetric, b/l upper extremities strength 5/5, b/l lower extremities strength 2/5 HF, 3/5 KF/KE, DF/PF/EHL 5/5. sensation intact to light touch throughout  HEENT: PERRL, EOMI  Neck: supple  Cardiac: RRR, S1S2  Pulmonary: chest rise symmetric  Abdomen: soft, nontender, nondistended  Ext: perfusing well  Skin: warm, dry  Wound: lumbar incision dressing c/d/i, deep HMV x 2, superficial ANA LUISA x 1          Assessment: 62y F with pmh polio with chronic b/l lower extremity weakness, DM, asthma, and HLD now s/p L5-S1 orellana laminectomy, L5-S1 TLIF, L4-S2/AI fusion (10/26).      Plan:  Neuro:  - neuro/vital checks q1  - pain control, ERAS  - pending standing AP/lateral xrays postop  - decadron 4q6 x 3 days  - monitor HMV x 2 and ANA LUISA x 1 drain outputs    Cardiac:  - normotensive BP goal  - cont home HCTZ, amlodipine,   Pulmonary:  - on RA    GI:  - ADAT  - bowel regimen    Renal/:  - Na goal 135-145  - NS at 75 until adequate PO intake  - cont home oxybutynin  - dc prado in AM    Heme:  - SCDs for DVT prophylaxis    Endo:  - ISS  - cont home lipitor    ID:  - Postop Ancef    Disposition: telemetry status, full code, dispo pending PT/OT    D/w Dr. Leighton      Assessment:  Present when checked    []  GCS  E   V  M     Heart Failure: []Acute, [] acute on chronic , []chronic  Heart Failure:  [] Diastolic (HFpEF), [] Systolic (HFrEF), []Combined (HFpEF and HFrEF), [] RHF, [] Pulm HTN, [] Other    [] DERICK, [] ATN, [] AIN, [] other  [] CKD1, [] CKD2, [] CKD 3, [] CKD 4, [] CKD 5, []ESRD    Encephalopathy: [] Metabolic, [] Hepatic, [] toxic, [] Neurological, [] Other    Abnormal Nurtitional Status: [] malnurtition (see nutrition note), [ ]underweight: BMI < 19, [] morbid obesity: BMI >40, [] Cachexia    [] Sepsis  [] hypovolemic shock,[] cardiogenic shock, [] hemorrhagic shock, [] neuogenic shock  [] Acute Respiratory Failure  []Cerebral edema, [] Brain compression/ herniation,   [] Functional quadriplegia  [] Acute blood loss anemia       NEUROSURGERY POST OP NOTE:    POD# 0 S/P L5-S1 alek laminectomy, L5-S1 TLIF, L4-S2/AI fusion    S: No complications. Postop patient c/o incisional pain and dizziness post-anesthesia.     Greenlandic speaking: translation by En Khan RN at bedside.    T(C): 36.6 (10-26-22 @ 16:04), Max: 36.6 (10-26-22 @ 16:04)  HR: 77 (10-26-22 @ 17:04) (74 - 96)  BP: 100/67 (10-26-22 @ 17:04) (77/60 - 106/70)  RR: 20 (10-26-22 @ 17:04) (20 - 30)  SpO2: 99% (10-26-22 @ 17:04) (99% - 100%)      10-26-22 @ 07:01  -  10-26-22 @ 17:12  --------------------------------------------------------  IN: 75 mL / OUT: 125 mL / NET: -50 mL        acetaminophen     Tablet .. 1000 milliGRAM(s) Oral every 8 hours  amLODIPine   Tablet 5 milliGRAM(s) Oral at bedtime  atorvastatin Oral Tab/Cap - Peds 40 milliGRAM(s) Oral daily  bisacodyl 5 milliGRAM(s) Oral every 12 hours PRN  brimonidine 0.2% Ophthalmic Solution 1 Drop(s) Both EYES daily  ceFAZolin   IVPB 2000 milliGRAM(s) IV Intermittent every 8 hours  dexAMETHasone  Injectable 4 milliGRAM(s) IV Push every 6 hours  dextrose 5%. 1000 milliLiter(s) IV Continuous <Continuous>  dextrose 50% Injectable 25 Gram(s) IV Push once  dextrose Oral Gel 15 Gram(s) Oral once PRN  dorzolamide 2% Ophthalmic Solution - Peds 1 Drop(s) Both EYES once  famotidine    Tablet 20 milliGRAM(s) Oral every 12 hours PRN  glucagon  Injectable 1 milliGRAM(s) IntraMuscular once  hydrochlorothiazide 12.5 milliGRAM(s) Oral daily  insulin lispro (ADMELOG) corrective regimen sliding scale   SubCutaneous three times a day before meals  latanoprost 0.005% Ophthalmic Solution 1 Drop(s) Both EYES at bedtime  losartan 100 milliGRAM(s) Oral daily  methocarbamol 500 milliGRAM(s) Oral every 8 hours  ondansetron   Disintegrating Tablet 4 milliGRAM(s) Oral every 6 hours  oxybutynin 5 milliGRAM(s) Oral two times a day  oxyCODONE    IR 5 milliGRAM(s) Oral every 4 hours PRN  pregabalin 50 milliGRAM(s) Oral three times a day  senna 2 Tablet(s) Oral at bedtime  sodium chloride 0.9%. 1000 milliLiter(s) IV Continuous <Continuous>      RADIOLOGY:     Exam:  General: patient seen laying supine in bed in NAD  Neuro: AAOx3, follows commands, opens eyes spontaneously, speech clear and fluent, face symmetric, b/l upper extremities strength 5/5, b/l lower extremities strength 2/5 HF, 3/5 KF/KE, DF/PF/EHL 5/5. sensation intact to light touch throughout  HEENT: PERRL, EOMI  Neck: supple  Cardiac: RRR, S1S2  Pulmonary: chest rise symmetric  Abdomen: soft, nontender, nondistended  Ext: perfusing well  Skin: warm, dry  Wound: lumbar incision dressing c/d/i, deep HMV x 2, superficial ANA LUISA x 1          Assessment: 62y F with pmh polio with chronic b/l lower extremity weakness, DM, asthma, and HLD now s/p L5-S1 orellana laminectomy, L5-S1 TLIF, L4-S2/AI fusion (10/26).      Plan:  Neuro:  - neuro/vital checks q1  - pain control, ERAS  - pending standing AP/lateral xrays postop  - decadron 4q6 x 3 days  - monitor HMV x 2 and ANA LUISA x 1 drain outputs    Cardiac:  - normotensive BP goal  - cont home HCTZ, amlodipine, losartan    Pulmonary:  - on RA    GI:  - ADAT  - bowel regimen    Renal/:  - Na goal 135-145  - NS at 75 until adequate PO intake  - cont home oxybutynin  - dc prado in AM    Heme:  - SCDs for DVT prophylaxis    Endo:  - ISS  - cont home lipitor    ID:  - Postop Ancef    Disposition: telemetry status, full code, dispo pending PT/OT    D/w Dr. Leighton      Assessment:  Present when checked    []  GCS  E   V  M     Heart Failure: []Acute, [] acute on chronic , []chronic  Heart Failure:  [] Diastolic (HFpEF), [] Systolic (HFrEF), []Combined (HFpEF and HFrEF), [] RHF, [] Pulm HTN, [] Other    [] DERICK, [] ATN, [] AIN, [] other  [] CKD1, [] CKD2, [] CKD 3, [] CKD 4, [] CKD 5, []ESRD    Encephalopathy: [] Metabolic, [] Hepatic, [] toxic, [] Neurological, [] Other    Abnormal Nurtitional Status: [] malnurtition (see nutrition note), [ ]underweight: BMI < 19, [] morbid obesity: BMI >40, [] Cachexia    [] Sepsis  [] hypovolemic shock,[] cardiogenic shock, [] hemorrhagic shock, [] neuogenic shock  [] Acute Respiratory Failure  []Cerebral edema, [] Brain compression/ herniation,   [] Functional quadriplegia  [] Acute blood loss anemia

## 2022-10-26 NOTE — BRIEF OPERATIVE NOTE - NSICDXBRIEFPROCEDURE_GEN_ALL_CORE_FT
PROCEDURES:  Fusion, spine, lumbar, interbody, 1-2 levels, transforaminal approach 26-Oct-2022 15:55:42  Gina Tate  Fusion of posterior lumbar spine 26-Oct-2022 15:55:54  Gina Tate

## 2022-10-26 NOTE — PRE-ANESTHESIA EVALUATION ADULT - NSANTHADDINFOFT_GEN_ALL_CORE
Risks, benefits and alternatives including but not limited to nausea, bleeding, and local trauma/positioning related injury discussed. All questions answered. The patient agrees to proceed. d/w  at bedside

## 2022-10-26 NOTE — PRE-ANESTHESIA EVALUATION ADULT - NSANTHPEFT_GEN_ALL_CORE
General: Appearance is consistent with chronological age. No abnormal facies.  Airway:  See Mallampati score  EENT: Anicteric sclera; oropharynx clear, moist mucus membranes  Cardiovascular:  Regular rate and rhythm  Respiratory: Unlabored breathing  Neurological: Awake and alert, moves all extremities, decreased RLE

## 2022-10-27 DIAGNOSIS — D62 ACUTE POSTHEMORRHAGIC ANEMIA: ICD-10-CM

## 2022-10-27 DIAGNOSIS — E78.00 PURE HYPERCHOLESTEROLEMIA, UNSPECIFIED: ICD-10-CM

## 2022-10-27 DIAGNOSIS — J45.909 UNSPECIFIED ASTHMA, UNCOMPLICATED: ICD-10-CM

## 2022-10-27 DIAGNOSIS — M47.816 SPONDYLOSIS WITHOUT MYELOPATHY OR RADICULOPATHY, LUMBAR REGION: ICD-10-CM

## 2022-10-27 DIAGNOSIS — A80.9 ACUTE POLIOMYELITIS, UNSPECIFIED: ICD-10-CM

## 2022-10-27 DIAGNOSIS — Z29.9 ENCOUNTER FOR PROPHYLACTIC MEASURES, UNSPECIFIED: ICD-10-CM

## 2022-10-27 DIAGNOSIS — I10 ESSENTIAL (PRIMARY) HYPERTENSION: ICD-10-CM

## 2022-10-27 DIAGNOSIS — E11.9 TYPE 2 DIABETES MELLITUS WITHOUT COMPLICATIONS: ICD-10-CM

## 2022-10-27 LAB
A1C WITH ESTIMATED AVERAGE GLUCOSE RESULT: 6.3 % — HIGH (ref 4–5.6)
ANION GAP SERPL CALC-SCNC: 8 MMOL/L — SIGNIFICANT CHANGE UP (ref 5–17)
BILIRUB SERPL-MCNC: 0.2 MG/DL — SIGNIFICANT CHANGE UP (ref 0.2–1.2)
BUN SERPL-MCNC: 7 MG/DL — SIGNIFICANT CHANGE UP (ref 7–23)
CALCIUM SERPL-MCNC: 7.7 MG/DL — LOW (ref 8.4–10.5)
CHLORIDE SERPL-SCNC: 112 MMOL/L — HIGH (ref 96–108)
CO2 SERPL-SCNC: 25 MMOL/L — SIGNIFICANT CHANGE UP (ref 22–31)
CREAT SERPL-MCNC: 0.18 MG/DL — LOW (ref 0.5–1.3)
EGFR: 137 ML/MIN/1.73M2 — SIGNIFICANT CHANGE UP
ESTIMATED AVERAGE GLUCOSE: 134 MG/DL — HIGH (ref 68–114)
GLUCOSE BLDC GLUCOMTR-MCNC: 177 MG/DL — HIGH (ref 70–99)
GLUCOSE BLDC GLUCOMTR-MCNC: 224 MG/DL — HIGH (ref 70–99)
GLUCOSE BLDC GLUCOMTR-MCNC: 242 MG/DL — HIGH (ref 70–99)
GLUCOSE BLDC GLUCOMTR-MCNC: 266 MG/DL — HIGH (ref 70–99)
GLUCOSE SERPL-MCNC: 171 MG/DL — HIGH (ref 70–99)
HCT VFR BLD CALC: 19.2 % — CRITICAL LOW (ref 34.5–45)
HCT VFR BLD CALC: 21 % — CRITICAL LOW (ref 34.5–45)
HCT VFR BLD CALC: 25 % — LOW (ref 34.5–45)
HGB BLD-MCNC: 6.4 G/DL — CRITICAL LOW (ref 11.5–15.5)
HGB BLD-MCNC: 7 G/DL — CRITICAL LOW (ref 11.5–15.5)
HGB BLD-MCNC: 8.5 G/DL — LOW (ref 11.5–15.5)
INR BLD: 1.02 — SIGNIFICANT CHANGE UP (ref 0.88–1.16)
LACTATE SERPL-SCNC: 1.5 MMOL/L — SIGNIFICANT CHANGE UP (ref 0.5–2)
MAGNESIUM SERPL-MCNC: 1.6 MG/DL — SIGNIFICANT CHANGE UP (ref 1.6–2.6)
MCHC RBC-ENTMCNC: 29.6 PG — SIGNIFICANT CHANGE UP (ref 27–34)
MCHC RBC-ENTMCNC: 30 PG — SIGNIFICANT CHANGE UP (ref 27–34)
MCHC RBC-ENTMCNC: 30.1 PG — SIGNIFICANT CHANGE UP (ref 27–34)
MCHC RBC-ENTMCNC: 33.3 GM/DL — SIGNIFICANT CHANGE UP (ref 32–36)
MCHC RBC-ENTMCNC: 33.3 GM/DL — SIGNIFICANT CHANGE UP (ref 32–36)
MCHC RBC-ENTMCNC: 34 GM/DL — SIGNIFICANT CHANGE UP (ref 32–36)
MCV RBC AUTO: 88.7 FL — SIGNIFICANT CHANGE UP (ref 80–100)
MCV RBC AUTO: 88.9 FL — SIGNIFICANT CHANGE UP (ref 80–100)
MCV RBC AUTO: 90.1 FL — SIGNIFICANT CHANGE UP (ref 80–100)
MELD SCORE WITH DIALYSIS: 20 POINTS — SIGNIFICANT CHANGE UP
MELD SCORE WITHOUT DIALYSIS: 7 POINTS — SIGNIFICANT CHANGE UP
NRBC # BLD: 0 /100 WBCS — SIGNIFICANT CHANGE UP (ref 0–0)
PHOSPHATE SERPL-MCNC: 4 MG/DL — SIGNIFICANT CHANGE UP (ref 2.5–4.5)
PLATELET # BLD AUTO: 169 K/UL — SIGNIFICANT CHANGE UP (ref 150–400)
PLATELET # BLD AUTO: 171 K/UL — SIGNIFICANT CHANGE UP (ref 150–400)
PLATELET # BLD AUTO: 176 K/UL — SIGNIFICANT CHANGE UP (ref 150–400)
POTASSIUM SERPL-MCNC: 3.3 MMOL/L — LOW (ref 3.5–5.3)
POTASSIUM SERPL-SCNC: 3.3 MMOL/L — LOW (ref 3.5–5.3)
PROTHROM AB SERPL-ACNC: 12.2 SEC — SIGNIFICANT CHANGE UP (ref 10.5–13.4)
RBC # BLD: 2.16 M/UL — LOW (ref 3.8–5.2)
RBC # BLD: 2.33 M/UL — LOW (ref 3.8–5.2)
RBC # BLD: 2.82 M/UL — LOW (ref 3.8–5.2)
RBC # FLD: 12.7 % — SIGNIFICANT CHANGE UP (ref 10.3–14.5)
RBC # FLD: 13.6 % — SIGNIFICANT CHANGE UP (ref 10.3–14.5)
RBC # FLD: 13.7 % — SIGNIFICANT CHANGE UP (ref 10.3–14.5)
SODIUM SERPL-SCNC: 145 MMOL/L — SIGNIFICANT CHANGE UP (ref 135–145)
WBC # BLD: 5.59 K/UL — SIGNIFICANT CHANGE UP (ref 3.8–10.5)
WBC # BLD: 6.11 K/UL — SIGNIFICANT CHANGE UP (ref 3.8–10.5)
WBC # BLD: 7.92 K/UL — SIGNIFICANT CHANGE UP (ref 3.8–10.5)
WBC # FLD AUTO: 5.59 K/UL — SIGNIFICANT CHANGE UP (ref 3.8–10.5)
WBC # FLD AUTO: 6.11 K/UL — SIGNIFICANT CHANGE UP (ref 3.8–10.5)
WBC # FLD AUTO: 7.92 K/UL — SIGNIFICANT CHANGE UP (ref 3.8–10.5)

## 2022-10-27 PROCEDURE — 99221 1ST HOSP IP/OBS SF/LOW 40: CPT

## 2022-10-27 RX ORDER — POTASSIUM CHLORIDE 20 MEQ
40 PACKET (EA) ORAL ONCE
Refills: 0 | Status: COMPLETED | OUTPATIENT
Start: 2022-10-27 | End: 2022-10-27

## 2022-10-27 RX ORDER — DORZOLAMIDE HYDROCHLORIDE 20 MG/ML
1 SOLUTION/ DROPS OPHTHALMIC EVERY 8 HOURS
Refills: 0 | Status: DISCONTINUED | OUTPATIENT
Start: 2022-10-27 | End: 2022-11-04

## 2022-10-27 RX ORDER — INSULIN LISPRO 100/ML
2 VIAL (ML) SUBCUTANEOUS
Refills: 0 | Status: DISCONTINUED | OUTPATIENT
Start: 2022-10-27 | End: 2022-10-28

## 2022-10-27 RX ORDER — POTASSIUM CHLORIDE 20 MEQ
40 PACKET (EA) ORAL EVERY 4 HOURS
Refills: 0 | Status: COMPLETED | OUTPATIENT
Start: 2022-10-27 | End: 2022-10-27

## 2022-10-27 RX ORDER — BRIMONIDINE TARTRATE 2 MG/MG
1 SOLUTION/ DROPS OPHTHALMIC EVERY 8 HOURS
Refills: 0 | Status: DISCONTINUED | OUTPATIENT
Start: 2022-10-27 | End: 2022-11-04

## 2022-10-27 RX ORDER — SODIUM CHLORIDE 9 MG/ML
1000 INJECTION INTRAMUSCULAR; INTRAVENOUS; SUBCUTANEOUS ONCE
Refills: 0 | Status: COMPLETED | OUTPATIENT
Start: 2022-10-27 | End: 2022-10-27

## 2022-10-27 RX ORDER — INSULIN GLARGINE 100 [IU]/ML
5 INJECTION, SOLUTION SUBCUTANEOUS AT BEDTIME
Refills: 0 | Status: DISCONTINUED | OUTPATIENT
Start: 2022-10-27 | End: 2022-10-28

## 2022-10-27 RX ORDER — ALBUMIN HUMAN 25 %
50 VIAL (ML) INTRAVENOUS ONCE
Refills: 0 | Status: COMPLETED | OUTPATIENT
Start: 2022-10-27 | End: 2022-10-27

## 2022-10-27 RX ORDER — ATORVASTATIN CALCIUM 80 MG/1
40 TABLET, FILM COATED ORAL AT BEDTIME
Refills: 0 | Status: DISCONTINUED | OUTPATIENT
Start: 2022-10-28 | End: 2022-11-04

## 2022-10-27 RX ORDER — MAGNESIUM SULFATE 500 MG/ML
2 VIAL (ML) INJECTION ONCE
Refills: 0 | Status: COMPLETED | OUTPATIENT
Start: 2022-10-27 | End: 2022-10-27

## 2022-10-27 RX ADMIN — Medication 50 MILLIGRAM(S): at 07:01

## 2022-10-27 RX ADMIN — ONDANSETRON 4 MILLIGRAM(S): 8 TABLET, FILM COATED ORAL at 07:02

## 2022-10-27 RX ADMIN — METHOCARBAMOL 500 MILLIGRAM(S): 500 TABLET, FILM COATED ORAL at 07:01

## 2022-10-27 RX ADMIN — Medication 1000 MILLIGRAM(S): at 07:01

## 2022-10-27 RX ADMIN — BRIMONIDINE TARTRATE 1 DROP(S): 2 SOLUTION/ DROPS OPHTHALMIC at 22:14

## 2022-10-27 RX ADMIN — Medication 40 MILLIEQUIVALENT(S): at 14:03

## 2022-10-27 RX ADMIN — Medication 1000 MILLIGRAM(S): at 07:39

## 2022-10-27 RX ADMIN — ONDANSETRON 4 MILLIGRAM(S): 8 TABLET, FILM COATED ORAL at 18:00

## 2022-10-27 RX ADMIN — Medication 4 MILLIGRAM(S): at 07:00

## 2022-10-27 RX ADMIN — DORZOLAMIDE HYDROCHLORIDE 1 DROP(S): 20 SOLUTION/ DROPS OPHTHALMIC at 22:18

## 2022-10-27 RX ADMIN — Medication 50 MILLIGRAM(S): at 14:02

## 2022-10-27 RX ADMIN — Medication 1000 MILLIGRAM(S): at 15:02

## 2022-10-27 RX ADMIN — LATANOPROST 1 DROP(S): 0.05 SOLUTION/ DROPS OPHTHALMIC; TOPICAL at 22:15

## 2022-10-27 RX ADMIN — SENNA PLUS 2 TABLET(S): 8.6 TABLET ORAL at 22:14

## 2022-10-27 RX ADMIN — Medication 1000 MILLIGRAM(S): at 22:13

## 2022-10-27 RX ADMIN — Medication 4: at 18:00

## 2022-10-27 RX ADMIN — ONDANSETRON 4 MILLIGRAM(S): 8 TABLET, FILM COATED ORAL at 12:09

## 2022-10-27 RX ADMIN — LATANOPROST 1 DROP(S): 0.05 SOLUTION/ DROPS OPHTHALMIC; TOPICAL at 01:09

## 2022-10-27 RX ADMIN — ATORVASTATIN CALCIUM 40 MILLIGRAM(S): 80 TABLET, FILM COATED ORAL at 12:09

## 2022-10-27 RX ADMIN — INSULIN GLARGINE 5 UNIT(S): 100 INJECTION, SOLUTION SUBCUTANEOUS at 22:18

## 2022-10-27 RX ADMIN — Medication 100 MILLIGRAM(S): at 07:02

## 2022-10-27 RX ADMIN — Medication 5 MILLIGRAM(S): at 07:02

## 2022-10-27 RX ADMIN — Medication 1000 MILLIGRAM(S): at 14:02

## 2022-10-27 RX ADMIN — Medication 4 MILLIGRAM(S): at 18:00

## 2022-10-27 RX ADMIN — Medication 50 MILLIGRAM(S): at 22:14

## 2022-10-27 RX ADMIN — Medication 4: at 22:12

## 2022-10-27 RX ADMIN — Medication 4 MILLIGRAM(S): at 01:09

## 2022-10-27 RX ADMIN — ONDANSETRON 4 MILLIGRAM(S): 8 TABLET, FILM COATED ORAL at 01:09

## 2022-10-27 RX ADMIN — BRIMONIDINE TARTRATE 1 DROP(S): 2 SOLUTION/ DROPS OPHTHALMIC at 12:08

## 2022-10-27 RX ADMIN — Medication 50 MILLILITER(S): at 10:50

## 2022-10-27 RX ADMIN — Medication 6: at 12:08

## 2022-10-27 RX ADMIN — Medication 40 MILLIEQUIVALENT(S): at 17:59

## 2022-10-27 RX ADMIN — Medication 25 GRAM(S): at 12:09

## 2022-10-27 RX ADMIN — Medication 40 MILLIEQUIVALENT(S): at 12:09

## 2022-10-27 RX ADMIN — METHOCARBAMOL 500 MILLIGRAM(S): 500 TABLET, FILM COATED ORAL at 22:14

## 2022-10-27 RX ADMIN — Medication 4 MILLIGRAM(S): at 12:09

## 2022-10-27 RX ADMIN — Medication 1000 MILLIGRAM(S): at 23:10

## 2022-10-27 RX ADMIN — Medication 2: at 07:53

## 2022-10-27 RX ADMIN — SODIUM CHLORIDE 1000 MILLILITER(S): 9 INJECTION INTRAMUSCULAR; INTRAVENOUS; SUBCUTANEOUS at 01:30

## 2022-10-27 RX ADMIN — METHOCARBAMOL 500 MILLIGRAM(S): 500 TABLET, FILM COATED ORAL at 14:02

## 2022-10-27 RX ADMIN — Medication 5 MILLIGRAM(S): at 19:49

## 2022-10-27 RX ADMIN — Medication 40 MILLIEQUIVALENT(S): at 22:14

## 2022-10-27 NOTE — OCCUPATIONAL THERAPY INITIAL EVALUATION ADULT - RANGE OF MOTION EXAMINATION, LOWER EXTREMITY
R LE weakness (polio) at baseline/Left LE Active ROM was WFL (within functional limits)/Right LE Passive ROM was WFL  (within functional limits)

## 2022-10-27 NOTE — CONSULT NOTE ADULT - PROBLEM SELECTOR RECOMMENDATION 2
Hgb drop to 6.4 from baseline 8.2  - s/p 1 unit PRBC and return to 8.5  -trend Hgb  -keep active Type and Screen  -keep 2 large bore IVs

## 2022-10-27 NOTE — OCCUPATIONAL THERAPY INITIAL EVALUATION ADULT - DIAGNOSIS, OT EVAL
Pt presents with chronic R LE weakness (baseline) and post op pain demonstrating decrease in balance and strength affecting ADLs and functional mobility.

## 2022-10-27 NOTE — CONSULT NOTE ADULT - PROBLEM SELECTOR RECOMMENDATION 9
-s/p L4-S2/AI fusion (10/26)  -pain control per neurosurgery team  -Steroids and drain management per primary team  -f/u PT recs AR

## 2022-10-27 NOTE — OCCUPATIONAL THERAPY INITIAL EVALUATION ADULT - PERTINENT HX OF CURRENT PROBLEM, REHAB EVAL
62y F with pmh polio with chronic b/l lower extremity weakness, DM, asthma, and HLD now s/p L5-S1 orellana laminectomy, L5-S1 TLIF, L4-S2/AI fusion (10/26)

## 2022-10-27 NOTE — OCCUPATIONAL THERAPY INITIAL EVALUATION ADULT - LIVES WITH, PROFILE
Pt lives with  and cousin in private house with no steps to enter. Pt has [facility] 2xweek (8 hours) reporting requires assist for ADLs (bathing/ lower body dressing) and uses straight cane/ RW for inside mobility and motorized scooter. +shower/tub and shower chair/other relative/spouse

## 2022-10-27 NOTE — PHYSICAL THERAPY INITIAL EVALUATION ADULT - GENERAL OBSERVATIONS, REHAB EVAL
Patient received semi-carbajal in bed  in NAD on RA, +SCDs, +PIV, +Hemovac x2, +ANA LUISA drain, +Telemetry. Cleared by LUCIE Jay. Agreeable to PT.

## 2022-10-27 NOTE — PHYSICAL THERAPY INITIAL EVALUATION ADULT - ADDITIONAL COMMENTS
Patient lives with spouse and cousin in private house with no steps to enter. Has Home Health Aid on Wednesdays and Thursdays (8 hours each). Ambulates with SC or RW indoors, uses electric scooter for outdoors. Owns shower chair. Reports multiple Hx of recent falls.

## 2022-10-27 NOTE — CONSULT NOTE ADULT - PROBLEM SELECTOR RECOMMENDATION 3
A1c 6.3 on Metformin and Januvia at home  -currently exacerbated by decadron  -add Lantus 5 and lispro premeal 2 units  -MISS

## 2022-10-27 NOTE — OCCUPATIONAL THERAPY INITIAL EVALUATION ADULT - ASSISTIVE DEVICE FOR TRANSFER: SIT/STAND, REHAB EVAL
Patient: Keyla Loo  : 1989  MRN: [id number]    Maternal Fetal Medicine Consult  Date of visit: 10/25/2019    Reason for visit: MFM consult with a history of epilepsy, migraines, marijuana use.  Chief Complaint   Patient presents with   • Consultation     Epilepsy, Anxiet/depression, migraines, using marujuana. On Prozac and Amytriptyline. 12 6/7 weeks. Referring Provider: Jo ARREDONDO Fax: [phone].   • Other      per ultrasound       History of Present Illness:  Patient has no complaints on the date of her visit on  and states her pregnancy has been going well.      Due date: 2020    OB History    Para Term  AB Living   2 1 1 0 0 1   SAB TAB Ectopic Molar Multiple Live Births   0 0 0 0 0 1         Past Medical History:   Diagnosis Date   • Anxiety 10/25/2019   • Asthma    • Epilepsy (CMS/[facility]) 2012   • Major depression 2018   • Marijuana use 10/25/2019   • Migraine headache 10/24/2018     Past Surgical History:   Procedure Laterality Date   • Hb treatment of arm fracture Left      ALLERGIES:   Allergen Reactions   • Latex SWELLING     Only in vaginal area   • Albuterol HEADACHES     Social History     Socioeconomic History   • Marital status: Single     Spouse name: Not on file   • Number of children: 1   • Years of education: Not on file   • Highest education level: Not on file   Occupational History   • 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 [employer]   Social Needs   • Financial resource strain: Not on file   • Food insecurity:     Worry: Not on file     Inability: Not on file   • Transportation needs:     Medical: Not on file     Non-medical: Not on file   Tobacco Use   • Smoking status: Never Smoker   • Smokeless tobacco: Never Used   • Tobacco comment: Smokes medical marijuana   Substance and Sexual Activity   • Alcohol use: Not Currently     Frequency: Never     Comment: 3 x  year   • Drug use: Yes     Frequency: 7.0 times per week      Types: Marijuana     Comment: uses 2-3 times a day   • Sexual activity: Not Currently     Comment: Pelvic rest   Lifestyle   • Physical activity:     Days per week: Not on file     Minutes per session: Not on file   • Stress: Not on file   Relationships   • Social connections:     Talks on phone: Not on file     Gets together: Not on file     Attends Jehovah's witness service: Not on file     Active member of club or organization: Not on file     Attends meetings of clubs or organizations: Not on file     Relationship status: Not on file   • Intimate partner violence:     Fear of current or ex partner: Not on file     Emotionally abused: Not on file     Physically abused: Not on file     Forced sexual activity: Not on file   Other Topics Concern   •  Service Not Asked   • Blood Transfusions No   • Caffeine Concern Not Asked   • Occupational Exposure Not Asked   • Hobby Hazards Not Asked   • Sleep Concern Not Asked   • Stress Concern Not Asked   • Weight Concern Not Asked   • Special Diet Not Asked   • Back Care Not Asked   • Exercise Not Asked   • Bike Helmet Not Asked   • Seat Belt Yes   • Self-Exams Yes   Social History Narrative   • Not on file       Family History   Problem Relation Age of Onset   • Diabetes Mother    • Hypotension Mother    • Asthma Mother    • Cancer, Ovarian Mother    • Anxiety disorder Mother    • Depression Mother    • Hypothyroid Mother    • Hypertension Father        Genetic history Yes No   Are you above 35 years of age  X   Thalassemia (Italian, Mediterranean, or  background)  X   Neural tube defect (Meningomyelocele, Spina Bifida, or Anencephaly)  X   Congenital heart defect  X   Down Syndrome  X   David-Sachs Disease  X   Sickle Cell Disease or trait  X   Hemophilia  X   Muscular Dystrophy  X   Cystic Fibrosis  X   Swapna Chorea  X   Mental Retardation/Autism  X   Inherited genetic, chromosomal disorder or birth defects   X   Maternal Metabolic Disorder (Diabetic, PKU)  X    Recurrent pregnancy loss or stillbirth  X       Visit Vitals  /64 (BP Location: RUE - Right upper extremity, Patient Position: Sitting, Cuff Size: Regular)   Pulse 79   Temp 98.3 °F (36.8 °C) (Oral)   Ht 4' 10.5\" (1.486 m)   Wt 58.3 kg (128 lb 6.7 oz)   LMP 2019   BMI 26.38 kg/m²       Physical Exam  deferred    Assessment and Plan:  30 year old    At 12 6/7 weeks with caesar 2020  #1 history of seizure disorder and anxiety and depression.   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  She states that this can present with migraines and she does not tolerate Keppra or gabapentin.  Patient is on zonegran (zonisamide)- Pregnancy category C and risks noted on micromedex reviewed with the patient.  Pt understands the risk and was previously counseled by neuro in her last pregnancy and is comfortable continuing the med in the pregnancy.  Patient states that she is on Prozac 40 mg daily for the last 6 months and on amitriptyline 10 mg daily for the last 6 months for her history of migraines and anxiety.   I do recommend a neurology consult to further manage her seizure disorder.  I reviewed the risks and benefits of the medications in pregnancy including but not limited to risk of birth defects.  Patient verbalized understanding and agreement and will continue those medications at this time.  For her history of depression the patient is on Prozac and is followed by her primary Dr. Macias-would also recommend a psychiatry consult.  .  #2 history of asthma.  Patient reports she is on singular 10 mg daily and uses albuterol inhaler as needed.  She reports her asthma is overall well controlled.  Viewed with patient that has been pregnancy can worsen, stay the same, or improve.  And to monitor for signs and symptoms of asthma exacerbation and monitor her peak flows.  I reviewed with her that if she is increasing her  inhaler use more than 2-3 times per week to consider further medications as needed to better controlled her asthma.  Risk of asthma in pregnancy if not well controlled such as risk of fetal growth restriction, worsening asthma attacks, stillbirth, preeclampsia, etc. was reviewed.  The importance of good control and medication if needed was again reviewed with the patient.    #3 gastritis history-On marijuana for her symptoms abdominal pain, crampy, n/v/d. Uses daily bid-tid- not prescription.  Recommend abstaining from marijuana in pregnancy.  [ ] GI recommended    #4 history of nephrolithiasis-In 2013  In last 6 months in 2019 passed one stone- still has stones noted on CT- [facility] at [facility].  At this time patient not having any kidney stone pain recommend adequate hydration and pregnancy and risk of kidney stone pain and recurrence of pregnancy reviewed as well.  Monitor for signs of urine infection during the pregnancy.    #5 Medication exposure--given the multiple medications the patient is on including her marijuana use I do recommend a formal genetics consult as well.    RECOMMENDATIONS  I recommend a level 2 ultrasound at 18 to 20 weeks in the pregnancy and serial growth ultrasounds every 4 weeks.  Recommend  testing starting at 32 weeks twice weekly or sooner if clinically warranted.  Consider delivery at 39 weeks or sooner if clinically indicated.  I also recommended genetics consult to review her medication exposure and risk in the pregnancy as well.  I recommend a neurology and psychiatry consult as well given her prior history.  I also recommend serial urine cultures in the pregnancy to monitor for signs of urine infection.  Monitor her disease frequency for her seizure disorder and assess for asthma exacerbations as needed.    Thank you for allowing me to participate in the care of your patient. Please call if there are further questions.  A copy of this consult will be faxed to your office.  Time  spent:  80 mins (>50% was in direct patient counseling)  Kelsi De Guzman MD       Patient Active Problem List    Diagnosis Date Noted   • Anxiety 10/25/2019     Priority: Medium   • Marijuana use 10/25/2019     Priority: Low   • Medication exposure during first trimester of pregnancy 10/25/2019     Priority: Low     [ ] genetics consult- marijuana, zonegran, amytriptiline     • Nephrolithiasis 10/25/2019     Priority: Low     In 2013  In last 6 month in 2019 passed one stone- still has stones noted on CT- CT at [facility].     • Supervision of high risk pregnancy, antepartum 10/25/2019     Priority: Low   • Migraine headache 10/24/2018     Priority: Low     Has migraines regularly- on amytriptiline for migrine and anxiety.  [ ] recommend neuro- pt getting req from her primary doc for neuro     • Gastritis, acute 08/02/2018     Priority: Low     On marijuana for her symptoms abdominal pain, crampy, n/v/d. 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  Pt understands the risk and was previously counselled by neuro in her last pregnancy and is comfortable continuing the med  [ ] neuro        with A

## 2022-10-27 NOTE — PROGRESS NOTE ADULT - SUBJECTIVE AND OBJECTIVE BOX
SUBJECTIVE:     HOSPITAL COURSE:      Vital Signs Last 24 Hrs  T(C): 37.2 (27 Oct 2022 01:14), Max: 37.2 (27 Oct 2022 01:14)  T(F): 99 (27 Oct 2022 01:14), Max: 99 (27 Oct 2022 01:14)  HR: 78 (27 Oct 2022 01:31) (69 - 96)  BP: 89/52 (27 Oct 2022 01:31) (77/60 - 113/59)  BP(mean): 72 (26 Oct 2022 20:28) (62 - 84)  RR: 16 (27 Oct 2022 01:14) (16 - 30)  SpO2: 95% (27 Oct 2022 01:14) (95% - 100%)    Parameters below as of 27 Oct 2022 01:14  Patient On (Oxygen Delivery Method): room air    I&O's Summary    26 Oct 2022 07:01  -  27 Oct 2022 02:37  --------------------------------------------------------  IN: 875 mL / OUT: 1445 mL / NET: -570 mL      PHYSICAL EXAM:  General: patient seen laying supine in bed in NAD  Neuro: AAOx3, follows commands, opens eyes spontaneously, speech clear and fluent, face symmetric, b/l upper extremities strength 5/5, b/l lower extremities strength 2/5 HF, 3/5 KF/KE, DF/PF/EHL 5/5. sensation intact to light touch throughout  HEENT: PERRL, EOMI  Neck: supple  Cardiac: RRR, S1S2  Pulmonary: chest rise symmetric  Abdomen: soft, nontender, nondistended  Ext: perfusing well  Skin: warm, dry  Wound: lumbar incision dressing c/d/i, deep HMV x 2, superficial ANA LUISA x 1    LABS:                        8.2    10.24 )-----------( 223      ( 26 Oct 2022 17:26 )             23.6     10-26    138  |  104  |  10  ----------------------------<  232<H>  3.7   |  23  |  0.20<L>    Ca    8.0<L>      26 Oct 2022 17:26      CAPILLARY BLOOD GLUCOSE      POCT Blood Glucose.: 227 mg/dL (26 Oct 2022 16:09)  POCT Blood Glucose.: 140 mg/dL (26 Oct 2022 07:09)      Drug Levels: [] N/A    CSF Analysis: [] N/A      Allergies    No Known Allergies    Intolerances      MEDICATIONS:  Antibiotics:  ceFAZolin   IVPB 2000 milliGRAM(s) IV Intermittent every 8 hours    Neuro:  acetaminophen     Tablet .. 1000 milliGRAM(s) Oral every 8 hours  methocarbamol 500 milliGRAM(s) Oral every 8 hours  ondansetron   Disintegrating Tablet 4 milliGRAM(s) Oral every 6 hours  oxyCODONE    IR 5 milliGRAM(s) Oral every 4 hours PRN  pregabalin 50 milliGRAM(s) Oral three times a day    Anticoagulation:    OTHER:  amLODIPine   Tablet 5 milliGRAM(s) Oral at bedtime  atorvastatin Oral Tab/Cap - Peds 40 milliGRAM(s) Oral daily  bisacodyl 5 milliGRAM(s) Oral every 12 hours PRN  brimonidine 0.2% Ophthalmic Solution 1 Drop(s) Both EYES daily  dexAMETHasone  Injectable 4 milliGRAM(s) IV Push every 6 hours  dextrose 50% Injectable 25 Gram(s) IV Push once  dextrose Oral Gel 15 Gram(s) Oral once PRN  dorzolamide 2% Ophthalmic Solution - Peds 1 Drop(s) Both EYES once  famotidine    Tablet 20 milliGRAM(s) Oral every 12 hours PRN  glucagon  Injectable 1 milliGRAM(s) IntraMuscular once  hydrochlorothiazide 12.5 milliGRAM(s) Oral daily  insulin lispro (ADMELOG) corrective regimen sliding scale   SubCutaneous three times a day before meals  latanoprost 0.005% Ophthalmic Solution 1 Drop(s) Both EYES at bedtime  losartan 100 milliGRAM(s) Oral daily  oxybutynin 5 milliGRAM(s) Oral two times a day  senna 2 Tablet(s) Oral at bedtime    IVF:  dextrose 5%. 1000 milliLiter(s) IV Continuous <Continuous>  sodium chloride 0.9%. 1000 milliLiter(s) IV Continuous <Continuous>    CULTURES:    RADIOLOGY & ADDITIONAL TESTS:      ASSESSMENT:   SUBJECTIVE: Patient complaining of right hand pain. Denies other complaints at this time.     HOSPITAL COURSE:  10/26: POD 0 L4-Pelvis fusion. HMV x 2, ANA LUISA x 2.   10/27: POD 1 lumbar fusion. JAMAL o/n     Vital Signs Last 24 Hrs  T(C): 37.2 (27 Oct 2022 01:14), Max: 37.2 (27 Oct 2022 01:14)  T(F): 99 (27 Oct 2022 01:14), Max: 99 (27 Oct 2022 01:14)  HR: 78 (27 Oct 2022 01:31) (69 - 96)  BP: 89/52 (27 Oct 2022 01:31) (77/60 - 113/59)  BP(mean): 72 (26 Oct 2022 20:28) (62 - 84)  RR: 16 (27 Oct 2022 01:14) (16 - 30)  SpO2: 95% (27 Oct 2022 01:14) (95% - 100%)    Parameters below as of 27 Oct 2022 01:14  Patient On (Oxygen Delivery Method): room air    I&O's Summary    26 Oct 2022 07:01  -  27 Oct 2022 02:37  --------------------------------------------------------  IN: 875 mL / OUT: 1445 mL / NET: -570 mL    PHYSICAL EXAM:  General: patient seen laying supine in bed in NAD  Neuro: AAOx3, follows commands, opens eyes spontaneously, speech clear and fluent, face symmetric, b/l upper extremities strength 5/5, b/l lower extremities strength 2/5 HF, 3/5 KF/KE, DF/PF/EHL 5/5. sensation intact to light touch throughout  HEENT: PERRL, EOMI  Neck: supple  Cardiac: RRR, S1S2  Pulmonary: chest rise symmetric  Abdomen: soft, nontender, nondistended  Ext: perfusing well  Skin: warm, dry  Wound: lumbar incision dressing c/d/i, deep HMV x 2, superficial ANA LUISA x 1    LABS:                        8.2    10.24 )-----------( 223      ( 26 Oct 2022 17:26 )             23.6     10-26    138  |  104  |  10  ----------------------------<  232<H>  3.7   |  23  |  0.20<L>    Ca    8.0<L>      26 Oct 2022 17:26    CAPILLARY BLOOD GLUCOSE    POCT Blood Glucose.: 227 mg/dL (26 Oct 2022 16:09)  POCT Blood Glucose.: 140 mg/dL (26 Oct 2022 07:09)    Drug Levels: [] N/A    CSF Analysis: [] N/A    Allergies    No Known Allergies    Intolerances    MEDICATIONS:  Antibiotics:  ceFAZolin   IVPB 2000 milliGRAM(s) IV Intermittent every 8 hours    Neuro:  acetaminophen     Tablet .. 1000 milliGRAM(s) Oral every 8 hours  methocarbamol 500 milliGRAM(s) Oral every 8 hours  ondansetron   Disintegrating Tablet 4 milliGRAM(s) Oral every 6 hours  oxyCODONE    IR 5 milliGRAM(s) Oral every 4 hours PRN  pregabalin 50 milliGRAM(s) Oral three times a day    Anticoagulation:    OTHER:  amLODIPine   Tablet 5 milliGRAM(s) Oral at bedtime  atorvastatin Oral Tab/Cap - Peds 40 milliGRAM(s) Oral daily  bisacodyl 5 milliGRAM(s) Oral every 12 hours PRN  brimonidine 0.2% Ophthalmic Solution 1 Drop(s) Both EYES daily  dexAMETHasone  Injectable 4 milliGRAM(s) IV Push every 6 hours  dextrose 50% Injectable 25 Gram(s) IV Push once  dextrose Oral Gel 15 Gram(s) Oral once PRN  dorzolamide 2% Ophthalmic Solution - Peds 1 Drop(s) Both EYES once  famotidine    Tablet 20 milliGRAM(s) Oral every 12 hours PRN  glucagon  Injectable 1 milliGRAM(s) IntraMuscular once  hydrochlorothiazide 12.5 milliGRAM(s) Oral daily  insulin lispro (ADMELOG) corrective regimen sliding scale   SubCutaneous three times a day before meals  latanoprost 0.005% Ophthalmic Solution 1 Drop(s) Both EYES at bedtime  losartan 100 milliGRAM(s) Oral daily  oxybutynin 5 milliGRAM(s) Oral two times a day  senna 2 Tablet(s) Oral at bedtime    IVF:  dextrose 5%. 1000 milliLiter(s) IV Continuous <Continuous>  sodium chloride 0.9%. 1000 milliLiter(s) IV Continuous <Continuous>    CULTURES:    RADIOLOGY & ADDITIONAL TESTS:      ASSESSMENT:  62y F with pmh polio with chronic b/l lower extremity weakness, DM, asthma, and HLD now s/p L5-S1 orellana laminectomy, L5-S1 TLIF, L4-S2/AI fusion (10/26).    Plan:  Neuro:  - neuro/vital checks q4  - pain control, ERAS  - pending standing AP/lateral xrays postop  - decadron 4q6 x 3 days  - monitor HMV x 2 and ANA LUISA x 2 drain outputs    Cardiac:  - normotensive BP goal  - cont home HCTZ, amlodipine, losartan    Pulmonary:  - on RA    GI:  - ADAT  - bowel regimen    Renal/:  - Na goal 135-145  - NS at 75 until adequate PO intake  - cont home oxybutynin  - dc prado in AM    Heme:  - SCDs for DVT prophylaxis    Endo:  - ISS  - cont home lipitor    ID:  - Postop Ancef    Disposition: telemetry status, full code, dispo pending PT/OT    D/w Dr. Manzanares SUBJECTIVE: Patient complaining of right hand pain. Denies other complaints at this time, weakness, numbness, shortness of breath, chest pain, palpitations, nausea, vomiting, confusion.     HOSPITAL COURSE:  10/26: POD 0 L4-Pelvis fusion. HMV x 2, ANA LUISA x 2.   10/27: POD 1 lumbar fusion. s/p 1 L bolus for SBP 80-90s. CBC obtained. Given 1 U prbc for Hgb 6.4. Remains hemodynamically stable.     Vital Signs Last 24 Hrs  T(C): 37.2 (27 Oct 2022 01:14), Max: 37.2 (27 Oct 2022 01:14)  T(F): 99 (27 Oct 2022 01:14), Max: 99 (27 Oct 2022 01:14)  HR: 78 (27 Oct 2022 01:31) (69 - 96)  BP: 89/52 (27 Oct 2022 01:31) (77/60 - 113/59)  BP(mean): 72 (26 Oct 2022 20:28) (62 - 84)  RR: 16 (27 Oct 2022 01:14) (16 - 30)  SpO2: 95% (27 Oct 2022 01:14) (95% - 100%)    Parameters below as of 27 Oct 2022 01:14  Patient On (Oxygen Delivery Method): room air    I&O's Summary    26 Oct 2022 07:01  -  27 Oct 2022 02:37  --------------------------------------------------------  IN: 875 mL / OUT: 1445 mL / NET: -570 mL    PHYSICAL EXAM:  General: patient seen laying supine in bed in NAD  Neuro: AAOx3, follows commands, opens eyes spontaneously, speech clear and fluent, face symmetric, b/l upper extremities strength 5/5, b/l lower extremities strength 3/5 HF, 3/5 KF/KE, DF/PF/EHL 5/5. sensation intact to light touch throughout  HEENT: PERRL, EOMI  Neck: supple  Cardiac: RRR, S1S2  Pulmonary: chest rise symmetric  Abdomen: soft, nontender, nondistended  Ext: perfusing well, good capillary response   Skin: warm, dry  Wound: lumbar incision dressing c/d/i, deep HMV x 2, superficial ANA LUISA x 1    LABS:                        8.2    10.24 )-----------( 223      ( 26 Oct 2022 17:26 )             23.6     10-26    138  |  104  |  10  ----------------------------<  232<H>  3.7   |  23  |  0.20<L>    Ca    8.0<L>      26 Oct 2022 17:26    CAPILLARY BLOOD GLUCOSE    POCT Blood Glucose.: 227 mg/dL (26 Oct 2022 16:09)  POCT Blood Glucose.: 140 mg/dL (26 Oct 2022 07:09)    Drug Levels: [] N/A    CSF Analysis: [] N/A    Allergies    No Known Allergies    Intolerances    MEDICATIONS:  Antibiotics:  ceFAZolin   IVPB 2000 milliGRAM(s) IV Intermittent every 8 hours    Neuro:  acetaminophen     Tablet .. 1000 milliGRAM(s) Oral every 8 hours  methocarbamol 500 milliGRAM(s) Oral every 8 hours  ondansetron   Disintegrating Tablet 4 milliGRAM(s) Oral every 6 hours  oxyCODONE    IR 5 milliGRAM(s) Oral every 4 hours PRN  pregabalin 50 milliGRAM(s) Oral three times a day    Anticoagulation:    OTHER:  amLODIPine   Tablet 5 milliGRAM(s) Oral at bedtime  atorvastatin Oral Tab/Cap - Peds 40 milliGRAM(s) Oral daily  bisacodyl 5 milliGRAM(s) Oral every 12 hours PRN  brimonidine 0.2% Ophthalmic Solution 1 Drop(s) Both EYES daily  dexAMETHasone  Injectable 4 milliGRAM(s) IV Push every 6 hours  dextrose 50% Injectable 25 Gram(s) IV Push once  dextrose Oral Gel 15 Gram(s) Oral once PRN  dorzolamide 2% Ophthalmic Solution - Peds 1 Drop(s) Both EYES once  famotidine    Tablet 20 milliGRAM(s) Oral every 12 hours PRN  glucagon  Injectable 1 milliGRAM(s) IntraMuscular once  hydrochlorothiazide 12.5 milliGRAM(s) Oral daily  insulin lispro (ADMELOG) corrective regimen sliding scale   SubCutaneous three times a day before meals  latanoprost 0.005% Ophthalmic Solution 1 Drop(s) Both EYES at bedtime  losartan 100 milliGRAM(s) Oral daily  oxybutynin 5 milliGRAM(s) Oral two times a day  senna 2 Tablet(s) Oral at bedtime    IVF:  dextrose 5%. 1000 milliLiter(s) IV Continuous <Continuous>  sodium chloride 0.9%. 1000 milliLiter(s) IV Continuous <Continuous>    CULTURES:    RADIOLOGY & ADDITIONAL TESTS:    ASSESSMENT:  62y F with pmh polio with chronic b/l lower extremity weakness, DM, asthma, and HLD now s/p L5-S1 orellana laminectomy, L5-S1 TLIF, L4-S2/AI fusion (10/26).    Plan:  Neuro:  - neuro/vital checks q4  - pain control, ERAS  - pending standing AP/lateral xrays postop  - decadron 4q6 x 3 days  - monitor HMV x 2 and ANA LUISA x 1 drain outputs    Cardiac:  - normotensive BP goal  - cont home HCTZ, amlodipine, losartan    Pulmonary:  - on RA    GI:  - CCD  - bowel regimen    Renal/:  - Na goal 135-145  - NS at 75 until adequate PO intake  - cont home oxybutynin  - rola prado in AM    Heme:  - SCDs for DVT prophylaxis  - s/p 1 U prbc (10/27), f/u CBC     Endo:  - ISS  - cont home lipitor    ID:  - Postop Ancef    Disposition: telemetry status, full code, dispo pending PT/OT    D/w Dr. Manzanares

## 2022-10-27 NOTE — OCCUPATIONAL THERAPY INITIAL EVALUATION ADULT - SHORT TERM MEMORY, REHAB EVAL
----- Message from Dawn Delong MD sent at 3/17/2022  8:30 AM CDT -----  Call patient with results.  ___    Mammogram is normal.     Left breast shows a complicated cyst that’s probably benign. Radiologist recommends that you do a six month follow-up ultrasound of the left breast in six months which is September.    Dawn DELONG MD   intact

## 2022-10-27 NOTE — PROVIDER CONTACT NOTE (CRITICAL VALUE NOTIFICATION) - PERSON GIVING RESULT:
Patient: Du Mayo  ROBOTIC VENTRAL HERNIA REPAIR WITH MESH  Anesthesia type: general    Patient location: PACU  Last vitals:   Vitals:    11/10/17 1008   BP: 121/64   Pulse: 77   Resp: 18   Temp:    SpO2: 97%     Post vital signs: stable  Level of consciousness: awake and responds to simple questions  Post-anesthesia pain: pain controlled  Post-anesthesia nausea and vomiting: no  Pulmonary: unassisted, return to baseline  Cardiovascular: stable and blood pressure at baseline  Hydration: adequate  Anesthetic events: no    QCDR Measures:  ASA# 11 - Poly-op Cardiac Arrest: ASA11B - Patient did NOT experience unanticipated cardiac arrest  ASA# 12 - Poly-op Mortality Rate: ASA12B - Patient did NOT die  ASA# 13 - PACU Re-Intubation Rate: ASA13B - Patient did NOT require a new airway mgmt  ASA# 10 - Composite Anes Safety: ASA10A - No serious adverse event    Additional Notes:  
Hematology
[facility]\lab

## 2022-10-27 NOTE — OCCUPATIONAL THERAPY INITIAL EVALUATION ADULT - GENERAL OBSERVATIONS, REHAB EVAL
Pt received semisupine in bed NAD, +IVL, +tele, +hemovac x2, + ANA LUISA drain, + lumbar dressing C/I/D

## 2022-10-27 NOTE — PHYSICAL THERAPY INITIAL EVALUATION ADULT - PERTINENT HX OF CURRENT PROBLEM, REHAB EVAL
62y F with pmh polio with chronic b/l lower extremity weakness, DM, asthma, and HLD now s/p L5-S1 orellana laminectomy, L5-S1 TLIF, L4-S2/AI fusion (10/26).

## 2022-10-28 DIAGNOSIS — R09.89 OTHER SPECIFIED SYMPTOMS AND SIGNS INVOLVING THE CIRCULATORY AND RESPIRATORY SYSTEMS: ICD-10-CM

## 2022-10-28 LAB
ANION GAP SERPL CALC-SCNC: 8 MMOL/L — SIGNIFICANT CHANGE UP (ref 5–17)
BUN SERPL-MCNC: 10 MG/DL — SIGNIFICANT CHANGE UP (ref 7–23)
CALCIUM SERPL-MCNC: 8.3 MG/DL — LOW (ref 8.4–10.5)
CHLORIDE SERPL-SCNC: 114 MMOL/L — HIGH (ref 96–108)
CO2 SERPL-SCNC: 24 MMOL/L — SIGNIFICANT CHANGE UP (ref 22–31)
CREAT SERPL-MCNC: 0.19 MG/DL — LOW (ref 0.5–1.3)
EGFR: 135 ML/MIN/1.73M2 — SIGNIFICANT CHANGE UP
GLUCOSE BLDC GLUCOMTR-MCNC: 193 MG/DL — HIGH (ref 70–99)
GLUCOSE BLDC GLUCOMTR-MCNC: 231 MG/DL — HIGH (ref 70–99)
GLUCOSE BLDC GLUCOMTR-MCNC: 239 MG/DL — HIGH (ref 70–99)
GLUCOSE BLDC GLUCOMTR-MCNC: 269 MG/DL — HIGH (ref 70–99)
GLUCOSE SERPL-MCNC: 208 MG/DL — HIGH (ref 70–99)
HCT VFR BLD CALC: 23 % — LOW (ref 34.5–45)
HCT VFR BLD CALC: 26.5 % — LOW (ref 34.5–45)
HGB BLD-MCNC: 7.7 G/DL — LOW (ref 11.5–15.5)
HGB BLD-MCNC: 8.9 G/DL — LOW (ref 11.5–15.5)
MAGNESIUM SERPL-MCNC: 2.2 MG/DL — SIGNIFICANT CHANGE UP (ref 1.6–2.6)
MCHC RBC-ENTMCNC: 29.6 PG — SIGNIFICANT CHANGE UP (ref 27–34)
MCHC RBC-ENTMCNC: 30.3 PG — SIGNIFICANT CHANGE UP (ref 27–34)
MCHC RBC-ENTMCNC: 33.5 GM/DL — SIGNIFICANT CHANGE UP (ref 32–36)
MCHC RBC-ENTMCNC: 33.6 GM/DL — SIGNIFICANT CHANGE UP (ref 32–36)
MCV RBC AUTO: 88 FL — SIGNIFICANT CHANGE UP (ref 80–100)
MCV RBC AUTO: 90.6 FL — SIGNIFICANT CHANGE UP (ref 80–100)
NRBC # BLD: 0 /100 WBCS — SIGNIFICANT CHANGE UP (ref 0–0)
NRBC # BLD: 0 /100 WBCS — SIGNIFICANT CHANGE UP (ref 0–0)
PHOSPHATE SERPL-MCNC: 3.4 MG/DL — SIGNIFICANT CHANGE UP (ref 2.5–4.5)
PLATELET # BLD AUTO: 171 K/UL — SIGNIFICANT CHANGE UP (ref 150–400)
PLATELET # BLD AUTO: 179 K/UL — SIGNIFICANT CHANGE UP (ref 150–400)
POTASSIUM SERPL-MCNC: 4.6 MMOL/L — SIGNIFICANT CHANGE UP (ref 3.5–5.3)
POTASSIUM SERPL-SCNC: 4.6 MMOL/L — SIGNIFICANT CHANGE UP (ref 3.5–5.3)
RBC # BLD: 2.54 M/UL — LOW (ref 3.8–5.2)
RBC # BLD: 3.01 M/UL — LOW (ref 3.8–5.2)
RBC # FLD: 14.1 % — SIGNIFICANT CHANGE UP (ref 10.3–14.5)
RBC # FLD: 14.9 % — HIGH (ref 10.3–14.5)
SODIUM SERPL-SCNC: 146 MMOL/L — HIGH (ref 135–145)
WBC # BLD: 6.94 K/UL — SIGNIFICANT CHANGE UP (ref 3.8–10.5)
WBC # BLD: 7.22 K/UL — SIGNIFICANT CHANGE UP (ref 3.8–10.5)
WBC # FLD AUTO: 6.94 K/UL — SIGNIFICANT CHANGE UP (ref 3.8–10.5)
WBC # FLD AUTO: 7.22 K/UL — SIGNIFICANT CHANGE UP (ref 3.8–10.5)

## 2022-10-28 PROCEDURE — 99233 SBSQ HOSP IP/OBS HIGH 50: CPT

## 2022-10-28 RX ORDER — TRAMADOL HYDROCHLORIDE 50 MG/1
50 TABLET ORAL EVERY 6 HOURS
Refills: 0 | Status: DISCONTINUED | OUTPATIENT
Start: 2022-10-28 | End: 2022-11-04

## 2022-10-28 RX ORDER — TRAMADOL HYDROCHLORIDE 50 MG/1
25 TABLET ORAL EVERY 6 HOURS
Refills: 0 | Status: DISCONTINUED | OUTPATIENT
Start: 2022-10-28 | End: 2022-10-28

## 2022-10-28 RX ORDER — INSULIN LISPRO 100/ML
5 VIAL (ML) SUBCUTANEOUS
Refills: 0 | Status: DISCONTINUED | OUTPATIENT
Start: 2022-10-28 | End: 2022-10-29

## 2022-10-28 RX ORDER — INSULIN GLARGINE 100 [IU]/ML
8 INJECTION, SOLUTION SUBCUTANEOUS AT BEDTIME
Refills: 0 | Status: DISCONTINUED | OUTPATIENT
Start: 2022-10-28 | End: 2022-10-29

## 2022-10-28 RX ORDER — ENOXAPARIN SODIUM 100 MG/ML
40 INJECTION SUBCUTANEOUS
Refills: 0 | Status: DISCONTINUED | OUTPATIENT
Start: 2022-10-28 | End: 2022-11-04

## 2022-10-28 RX ADMIN — Medication 2 UNIT(S): at 07:56

## 2022-10-28 RX ADMIN — BRIMONIDINE TARTRATE 1 DROP(S): 2 SOLUTION/ DROPS OPHTHALMIC at 06:33

## 2022-10-28 RX ADMIN — Medication 4 MILLIGRAM(S): at 00:45

## 2022-10-28 RX ADMIN — Medication 50 MILLIGRAM(S): at 21:24

## 2022-10-28 RX ADMIN — METHOCARBAMOL 500 MILLIGRAM(S): 500 TABLET, FILM COATED ORAL at 14:42

## 2022-10-28 RX ADMIN — Medication 2 UNIT(S): at 12:58

## 2022-10-28 RX ADMIN — Medication 1000 MILLIGRAM(S): at 21:23

## 2022-10-28 RX ADMIN — Medication 1000 MILLIGRAM(S): at 07:30

## 2022-10-28 RX ADMIN — SENNA PLUS 2 TABLET(S): 8.6 TABLET ORAL at 21:23

## 2022-10-28 RX ADMIN — Medication 1000 MILLIGRAM(S): at 22:20

## 2022-10-28 RX ADMIN — Medication 50 MILLIGRAM(S): at 06:32

## 2022-10-28 RX ADMIN — Medication 1000 MILLIGRAM(S): at 15:15

## 2022-10-28 RX ADMIN — METHOCARBAMOL 500 MILLIGRAM(S): 500 TABLET, FILM COATED ORAL at 21:24

## 2022-10-28 RX ADMIN — METHOCARBAMOL 500 MILLIGRAM(S): 500 TABLET, FILM COATED ORAL at 06:32

## 2022-10-28 RX ADMIN — INSULIN GLARGINE 8 UNIT(S): 100 INJECTION, SOLUTION SUBCUTANEOUS at 21:22

## 2022-10-28 RX ADMIN — Medication 4 MILLIGRAM(S): at 12:56

## 2022-10-28 RX ADMIN — Medication 4: at 21:23

## 2022-10-28 RX ADMIN — Medication 1000 MILLIGRAM(S): at 14:41

## 2022-10-28 RX ADMIN — TRAMADOL HYDROCHLORIDE 50 MILLIGRAM(S): 50 TABLET ORAL at 22:20

## 2022-10-28 RX ADMIN — LATANOPROST 1 DROP(S): 0.05 SOLUTION/ DROPS OPHTHALMIC; TOPICAL at 21:25

## 2022-10-28 RX ADMIN — Medication 50 MILLIGRAM(S): at 15:54

## 2022-10-28 RX ADMIN — TRAMADOL HYDROCHLORIDE 50 MILLIGRAM(S): 50 TABLET ORAL at 21:23

## 2022-10-28 RX ADMIN — DORZOLAMIDE HYDROCHLORIDE 1 DROP(S): 20 SOLUTION/ DROPS OPHTHALMIC at 06:33

## 2022-10-28 RX ADMIN — Medication 2: at 07:55

## 2022-10-28 RX ADMIN — Medication 1000 MILLIGRAM(S): at 06:35

## 2022-10-28 RX ADMIN — ENOXAPARIN SODIUM 40 MILLIGRAM(S): 100 INJECTION SUBCUTANEOUS at 21:22

## 2022-10-28 RX ADMIN — Medication 5 MILLIGRAM(S): at 18:15

## 2022-10-28 RX ADMIN — BRIMONIDINE TARTRATE 1 DROP(S): 2 SOLUTION/ DROPS OPHTHALMIC at 15:40

## 2022-10-28 RX ADMIN — Medication 5 UNIT(S): at 18:39

## 2022-10-28 RX ADMIN — Medication 4: at 12:57

## 2022-10-28 RX ADMIN — Medication 6: at 18:13

## 2022-10-28 RX ADMIN — Medication 4 MILLIGRAM(S): at 06:32

## 2022-10-28 RX ADMIN — ONDANSETRON 4 MILLIGRAM(S): 8 TABLET, FILM COATED ORAL at 18:13

## 2022-10-28 RX ADMIN — ONDANSETRON 4 MILLIGRAM(S): 8 TABLET, FILM COATED ORAL at 06:32

## 2022-10-28 RX ADMIN — Medication 4 MILLIGRAM(S): at 18:18

## 2022-10-28 RX ADMIN — Medication 5 MILLIGRAM(S): at 10:20

## 2022-10-28 RX ADMIN — DORZOLAMIDE HYDROCHLORIDE 1 DROP(S): 20 SOLUTION/ DROPS OPHTHALMIC at 15:45

## 2022-10-28 RX ADMIN — ATORVASTATIN CALCIUM 40 MILLIGRAM(S): 80 TABLET, FILM COATED ORAL at 21:23

## 2022-10-28 NOTE — H&P ADULT - NSICDXPASTMEDICALHX_GEN_ALL_CORE_FT
PAST MEDICAL HISTORY:  Asthma     Colonic polyp     COVID-19 virus infection     DM (diabetes mellitus)     History of colitis     History of urinary incontinence     HTN (hypertension)     Hypercholesterolemia     Lumbar spondylosis L5    Osteoarthritis     Patella fracture left    Poliomyelitis     Spondylolisthesis, lumbar region     Trigger finger

## 2022-10-28 NOTE — PROGRESS NOTE ADULT - SUBJECTIVE AND OBJECTIVE BOX
SUBJECTIVE:  Patient seen and examined at bedside.    ROS:  Denies fevers, chills, headache, vision changes, neck pain, cough, SOB, chest pain, Abdominal pain, N/V, dysuria or new rash.  All other ROS negative except as above    Vital Signs Last 12 Hrs  T(F): 98.2 (10-28-22 @ 13:15), Max: 98.4 (10-28-22 @ 09:55)  HR: 77 (10-28-22 @ 13:15) (68 - 77)  BP: 102/64 (10-28-22 @ 13:15) (94/56 - 104/69)  BP(mean): --  RR: 18 (10-28-22 @ 13:15) (17 - 18)  SpO2: 98% (10-28-22 @ 13:15) (95% - 98%)  I&O's Summary    27 Oct 2022 07:01  -  28 Oct 2022 07:00  --------------------------------------------------------  IN: 0 mL / OUT: 1140 mL / NET: -1140 mL    28 Oct 2022 07:01  -  28 Oct 2022 15:09  --------------------------------------------------------  IN: 0 mL / OUT: 610 mL / NET: -610 mL        PHYSICAL EXAM:  Constitutional: NAD, comfortable in bed.  HEENT: PERRLA, EOMI, no conjunctival pallor or scleral icterus, MMM  Neck: Supple, no JVD  Respiratory: CTA B/L. No w/r/r.   Cardiovascular: RRR, normal S1 and S2, no m/r/g.   Gastrointestinal: +BS, soft NTND, no guarding or rebound tenderness, no palpable masses   Extremities: wwp; no cyanosis, clubbing or edema.   Vascular: Pulses equal and strong throughout.   Neurological: AAOx3, no CN deficits, strength and sensation intact throughout.   Skin: No gross skin abnormalities or rashes        LABS:                        8.9    7.22  )-----------( 179      ( 28 Oct 2022 14:37 )             26.5     10-28    146<H>  |  114<H>  |  10  ----------------------------<  208<H>  4.6   |  24  |  0.19<L>    Ca    8.3<L>      28 Oct 2022 05:30  Phos  3.4     10-28  Mg     2.2     10-28    TPro  x   /  Alb  x   /  TBili  0.2  /  DBili  x   /  AST  x   /  ALT  x   /  AlkPhos  x   10-27    PT/INR - ( 27 Oct 2022 07:55 )   PT: 12.2 sec;   INR: 1.02                  RADIOLOGY & ADDITIONAL TESTS:    MEDICATIONS  (STANDING):  acetaminophen     Tablet .. 1000 milliGRAM(s) Oral every 8 hours  atorvastatin 40 milliGRAM(s) Oral at bedtime  brimonidine 0.2% Ophthalmic Solution 1 Drop(s) Both EYES every 8 hours  dexAMETHasone  Injectable 4 milliGRAM(s) IV Push every 6 hours  dorzolamide 2% Ophthalmic Solution 1 Drop(s) Both EYES every 8 hours  enoxaparin Injectable 40 milliGRAM(s) SubCutaneous <User Schedule>  insulin glargine Injectable (LANTUS) 5 Unit(s) SubCutaneous at bedtime  insulin lispro (ADMELOG) corrective regimen sliding scale   SubCutaneous Before meals and at bedtime  insulin lispro Injectable (ADMELOG) 2 Unit(s) SubCutaneous three times a day before meals  latanoprost 0.005% Ophthalmic Solution 1 Drop(s) Both EYES at bedtime  methocarbamol 500 milliGRAM(s) Oral every 8 hours  ondansetron   Disintegrating Tablet 4 milliGRAM(s) Oral every 6 hours  oxybutynin 5 milliGRAM(s) Oral two times a day  pregabalin 50 milliGRAM(s) Oral three times a day  senna 2 Tablet(s) Oral at bedtime    MEDICATIONS  (PRN):  bisacodyl 5 milliGRAM(s) Oral every 12 hours PRN Constipation  famotidine    Tablet 20 milliGRAM(s) Oral every 12 hours PRN Dyspepsia  traMADol 25 milliGRAM(s) Oral every 6 hours PRN Moderate Pain (4 - 6)  traMADol 50 milliGRAM(s) Oral every 6 hours PRN Severe Pain (7 - 10)   SUBJECTIVE:  Patient seen and examined at bedside.  Lying in bed, reports she has significant back pain with any movement.  Tolerating PO intake well    ROS:  Denies fevers, chills, headache, vision changes, cough, SOB, chest pain, Abdominal pain, N/V, dysuria or new rash.  All other ROS negative except as above    Vital Signs Last 12 Hrs  T(F): 98.2 (10-28-22 @ 13:15), Max: 98.4 (10-28-22 @ 09:55)  HR: 77 (10-28-22 @ 13:15) (68 - 77)  BP: 102/64 (10-28-22 @ 13:15) (94/56 - 104/69)  BP(mean): --  RR: 18 (10-28-22 @ 13:15) (17 - 18)  SpO2: 98% (10-28-22 @ 13:15) (95% - 98%)  I&O's Summary    27 Oct 2022 07:01  -  28 Oct 2022 07:00  --------------------------------------------------------  IN: 0 mL / OUT: 1140 mL / NET: -1140 mL    28 Oct 2022 07:01  -  28 Oct 2022 15:09  --------------------------------------------------------  IN: 0 mL / OUT: 610 mL / NET: -610 mL        PHYSICAL EXAM:  Constitutional: NAD, elderly female lying comfortable in bed.  HEENT: PERRLA, EOMI, no conjunctival pallor or scleral icterus, MMM  Neck: Supple, no JVD  Respiratory: CTA B/L. No w/r/r.   Cardiovascular: RRR, normal S1 and S2, no m/r/g.   Gastrointestinal: +BS, soft NTND, no guarding or rebound tenderness, no palpable masses   Back: Hemovac and ANA LUISA drain with serosanguinous fluid  Extremities: wwp; no cyanosis, clubbing or edema.   Vascular: Pulses equal and strong throughout.   Neurological: AAOx3, no CN deficits, strength 5/5 b/l UEs, 3/5 b/l LEs and sensation intact throughout.   Skin: No gross skin abnormalities or rashes        LABS:                        8.9    7.22  )-----------( 179      ( 28 Oct 2022 14:37 )             26.5     10-28    146<H>  |  114<H>  |  10  ----------------------------<  208<H>  4.6   |  24  |  0.19<L>    Ca    8.3<L>      28 Oct 2022 05:30  Phos  3.4     10-28  Mg     2.2     10-28    TPro  x   /  Alb  x   /  TBili  0.2  /  DBili  x   /  AST  x   /  ALT  x   /  AlkPhos  x   10-27    PT/INR - ( 27 Oct 2022 07:55 )   PT: 12.2 sec;   INR: 1.02                  RADIOLOGY & ADDITIONAL TESTS:  No new imaging    MEDICATIONS  (STANDING):  acetaminophen     Tablet .. 1000 milliGRAM(s) Oral every 8 hours  atorvastatin 40 milliGRAM(s) Oral at bedtime  brimonidine 0.2% Ophthalmic Solution 1 Drop(s) Both EYES every 8 hours  dexAMETHasone  Injectable 4 milliGRAM(s) IV Push every 6 hours  dorzolamide 2% Ophthalmic Solution 1 Drop(s) Both EYES every 8 hours  enoxaparin Injectable 40 milliGRAM(s) SubCutaneous <User Schedule>  insulin glargine Injectable (LANTUS) 5 Unit(s) SubCutaneous at bedtime  insulin lispro (ADMELOG) corrective regimen sliding scale   SubCutaneous Before meals and at bedtime  insulin lispro Injectable (ADMELOG) 2 Unit(s) SubCutaneous three times a day before meals  latanoprost 0.005% Ophthalmic Solution 1 Drop(s) Both EYES at bedtime  methocarbamol 500 milliGRAM(s) Oral every 8 hours  ondansetron   Disintegrating Tablet 4 milliGRAM(s) Oral every 6 hours  oxybutynin 5 milliGRAM(s) Oral two times a day  pregabalin 50 milliGRAM(s) Oral three times a day  senna 2 Tablet(s) Oral at bedtime    MEDICATIONS  (PRN):  bisacodyl 5 milliGRAM(s) Oral every 12 hours PRN Constipation  famotidine    Tablet 20 milliGRAM(s) Oral every 12 hours PRN Dyspepsia  traMADol 25 milliGRAM(s) Oral every 6 hours PRN Moderate Pain (4 - 6)  traMADol 50 milliGRAM(s) Oral every 6 hours PRN Severe Pain (7 - 10)

## 2022-10-28 NOTE — PROGRESS NOTE ADULT - SUBJECTIVE AND OBJECTIVE BOX
SUBJECTIVE:   Patient denies pain at this time. States she has to use the bathroom and has been voiding on her own since prado removed. Denies new weakness, numbness.     HOSPITAL COURSE:  10/26: POD 0 L4-Pelvis fusion. HMV x 2, ANA LUISA x 1  10/27: POD 1 lumbar fusion. JAMAL o/n. Given 1 U prbc for Hgb 6.4. Hemoglobin 8.5 post transfusion. Prado d/c'ed this morning, passed TOV. Pending standing x-rays   10/28: POD 2 L4-S1 fusion. Drains remain, Pending XR.     Vital Signs Last 24 Hrs  T(C): 36.7 (28 Oct 2022 00:42), Max: 37.3 (27 Oct 2022 05:28)  T(F): 98 (28 Oct 2022 00:42), Max: 99.1 (27 Oct 2022 05:28)  HR: 72 (28 Oct 2022 00:42) (63 - 93)  BP: 96/54 (28 Oct 2022 00:42) (86/51 - 126/74)  BP(mean): --  RR: 16 (28 Oct 2022 00:42) (16 - 18)  SpO2: 96% (28 Oct 2022 00:42) (95% - 99%)    Parameters below as of 28 Oct 2022 00:42  Patient On (Oxygen Delivery Method): nasal cannula        I&O's Summary    26 Oct 2022 07:01  -  27 Oct 2022 07:00  --------------------------------------------------------  IN: 875 mL / OUT: 1910 mL / NET: -1035 mL    27 Oct 2022 07:01  -  28 Oct 2022 02:37  --------------------------------------------------------  IN: 0 mL / OUT: 940 mL / NET: -940 mL        PHYSICAL EXAM:  General: patient seen laying supine in bed in NAD  Neuro: AAOx3, follows commands, opens eyes spontaneously, speech clear and fluent, face symmetric, b/l upper extremities strength 5/5, b/l lower extremities strength 3/5 HF, 3/5 KF/KE, DF/PF/EHL 5/5. sensation intact to light touch throughout  HEENT: PERRL, EOMI  Neck: supple  Cardiac: RRR, S1S2  Pulmonary: chest rise symmetric  Abdomen: soft, nontender, nondistended  Ext: perfusing well, good capillary response   Skin: warm, dry  Wound: lumbar incision dressing c/d/i, deep HMV x 2, superficial ANA LUISA x 1    LABS:                        8.5    7.92  )-----------( 176      ( 27 Oct 2022 08:53 )             25.0     10-27    145  |  112<H>  |  7   ----------------------------<  171<H>  3.3<L>   |  25  |  0.18<L>    Ca    7.7<L>      27 Oct 2022 07:55  Phos  4.0     10-27  Mg     1.6     10-27    TPro  x   /  Alb  x   /  TBili  0.2  /  DBili  x   /  AST  x   /  ALT  x   /  AlkPhos  x   10-27    PT/INR - ( 27 Oct 2022 07:55 )   PT: 12.2 sec;   INR: 1.02                  CAPILLARY BLOOD GLUCOSE      POCT Blood Glucose.: 224 mg/dL (27 Oct 2022 21:58)  POCT Blood Glucose.: 242 mg/dL (27 Oct 2022 17:24)  POCT Blood Glucose.: 266 mg/dL (27 Oct 2022 11:52)  POCT Blood Glucose.: 177 mg/dL (27 Oct 2022 07:44)      Drug Levels: [] N/A    CSF Analysis: [] N/A      Allergies    No Known Allergies    Intolerances      MEDICATIONS:  Antibiotics:    Neuro:  acetaminophen     Tablet .. 1000 milliGRAM(s) Oral every 8 hours  methocarbamol 500 milliGRAM(s) Oral every 8 hours  ondansetron   Disintegrating Tablet 4 milliGRAM(s) Oral every 6 hours  oxyCODONE    IR 5 milliGRAM(s) Oral every 4 hours PRN  pregabalin 50 milliGRAM(s) Oral three times a day    Anticoagulation:    OTHER:  atorvastatin 40 milliGRAM(s) Oral at bedtime  bisacodyl 5 milliGRAM(s) Oral every 12 hours PRN  brimonidine 0.2% Ophthalmic Solution 1 Drop(s) Both EYES every 8 hours  dexAMETHasone  Injectable 4 milliGRAM(s) IV Push every 6 hours  dextrose 50% Injectable 25 Gram(s) IV Push once  dextrose Oral Gel 15 Gram(s) Oral once PRN  dorzolamide 2% Ophthalmic Solution 1 Drop(s) Both EYES every 8 hours  famotidine    Tablet 20 milliGRAM(s) Oral every 12 hours PRN  insulin glargine Injectable (LANTUS) 5 Unit(s) SubCutaneous at bedtime  insulin lispro (ADMELOG) corrective regimen sliding scale   SubCutaneous Before meals and at bedtime  insulin lispro Injectable (ADMELOG) 2 Unit(s) SubCutaneous three times a day before meals  latanoprost 0.005% Ophthalmic Solution 1 Drop(s) Both EYES at bedtime  oxybutynin 5 milliGRAM(s) Oral two times a day  senna 2 Tablet(s) Oral at bedtime    IVF:  dextrose 5%. 1000 milliLiter(s) IV Continuous <Continuous>    CULTURES:    RADIOLOGY & ADDITIONAL TESTS:      ASSESSMENT:  62y F with pmh polio with chronic b/l lower extremity weakness, DM, asthma, and HLD now s/p L5-S1 orellana laminectomy, L5-S1 TLIF, L4-S2/AI fusion (10/26).    Plan:  Neuro:  - neuro/vital checks q4  - pain control, ERAS  - pending standing AP/lateral xrays postop  - decadron 4q6 x 3 days  - monitor HMV x 2 and ANA LUISA x 1 drain outputs    Cardiac:  - normotensive BP goal  - Hold home BP meds: HCTZ, amlodipine, losartan    Pulmonary:  - on RA    GI:  - Regular diet   - bowel regimen    Renal/:  - Na goal 135-145  - cont home oxybutynin    Heme:  - SCDs for DVT prophylaxis  - s/p 1 U PRBC post-op     Endo:  - ISS  - cont home lipitor    ID:  - Postop Ancef    Disposition:  - telemetry status, full code  - PT/OT recc AR     D/w Dr. Manzanares

## 2022-10-28 NOTE — H&P ADULT - HISTORY OF PRESENT ILLNESS
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 She has a longstanding history of right lower extremity weakness due to childhood illness which is suspicious for polio. She recently has had severe back and leg pain as well as many recent falls. 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 Pt is here for an elective surgery by Dr Manzanares.

## 2022-10-28 NOTE — H&P ADULT - ASSESSMENT
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 She has a longstanding history of right lower extremity weakness due to childhood illness which is suspicious for polio. She recently has had severe back and leg pain as well as many recent falls. 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 Here for an elective surgery by Dr Manzanares

## 2022-10-28 NOTE — PROCEDURE NOTE - GENERAL PROCEDURE DETAILS
after a sterile prep the right posterior hemovac was taken off suction, anchoring sutures removed, distal tip of the catheter was visualized while advanced out, steril-strips and sterile gauze dressing placed

## 2022-10-28 NOTE — H&P ADULT - NSHPPHYSICALEXAM_GEN_ALL_CORE
Neuro: AAOx3, follows commands, opens eyes spontaneously, speech clear and fluent, face symmetric, b/l upper extremities strength 5/5, b/l lower extremities strength 3/5 HF, 3/5 KF/KE, DF/PF/EHL 5/5. sensation intact to light touch throughout  HEENT: PERRL, EOMI  Neck: supple  Cardiac: RRR, S1S2  Pulmonary: chest rise symmetric  Abdomen: soft, nontender, nondistended  Ext: perfusing well, good capillary response   Skin: warm, dry

## 2022-10-28 NOTE — H&P ADULT - PROBLEM SELECTOR PROBLEM 1
Detail Level: Simple
Price (Do Not Change): 0.00
Instructions: This plan will send the code [id number] to the PM system.  DO NOT or CHANGE the price.
Lumbar spondylosis

## 2022-10-29 LAB
ANION GAP SERPL CALC-SCNC: 15 MMOL/L — SIGNIFICANT CHANGE UP (ref 5–17)
BUN SERPL-MCNC: 12 MG/DL — SIGNIFICANT CHANGE UP (ref 7–23)
CALCIUM SERPL-MCNC: 8.4 MG/DL — SIGNIFICANT CHANGE UP (ref 8.4–10.5)
CHLORIDE SERPL-SCNC: 103 MMOL/L — SIGNIFICANT CHANGE UP (ref 96–108)
CO2 SERPL-SCNC: 24 MMOL/L — SIGNIFICANT CHANGE UP (ref 22–31)
CREAT SERPL-MCNC: 0.25 MG/DL — LOW (ref 0.5–1.3)
EGFR: 127 ML/MIN/1.73M2 — SIGNIFICANT CHANGE UP
GLUCOSE BLDC GLUCOMTR-MCNC: 191 MG/DL — HIGH (ref 70–99)
GLUCOSE BLDC GLUCOMTR-MCNC: 197 MG/DL — HIGH (ref 70–99)
GLUCOSE BLDC GLUCOMTR-MCNC: 202 MG/DL — HIGH (ref 70–99)
GLUCOSE BLDC GLUCOMTR-MCNC: 207 MG/DL — HIGH (ref 70–99)
GLUCOSE BLDC GLUCOMTR-MCNC: 253 MG/DL — HIGH (ref 70–99)
GLUCOSE BLDC GLUCOMTR-MCNC: 352 MG/DL — HIGH (ref 70–99)
GLUCOSE SERPL-MCNC: 247 MG/DL — HIGH (ref 70–99)
HCT VFR BLD CALC: 26.9 % — LOW (ref 34.5–45)
HCT VFR BLD CALC: 28.1 % — LOW (ref 34.5–45)
HCV AB S/CO SERPL IA: 0.03 S/CO — SIGNIFICANT CHANGE UP
HCV AB SERPL-IMP: SIGNIFICANT CHANGE UP
HGB BLD-MCNC: 9.1 G/DL — LOW (ref 11.5–15.5)
HGB BLD-MCNC: 9.2 G/DL — LOW (ref 11.5–15.5)
MAGNESIUM SERPL-MCNC: 1.9 MG/DL — SIGNIFICANT CHANGE UP (ref 1.6–2.6)
MCHC RBC-ENTMCNC: 29.3 PG — SIGNIFICANT CHANGE UP (ref 27–34)
MCHC RBC-ENTMCNC: 29.8 PG — SIGNIFICANT CHANGE UP (ref 27–34)
MCHC RBC-ENTMCNC: 32.7 GM/DL — SIGNIFICANT CHANGE UP (ref 32–36)
MCHC RBC-ENTMCNC: 33.8 GM/DL — SIGNIFICANT CHANGE UP (ref 32–36)
MCV RBC AUTO: 88.2 FL — SIGNIFICANT CHANGE UP (ref 80–100)
MCV RBC AUTO: 89.5 FL — SIGNIFICANT CHANGE UP (ref 80–100)
NRBC # BLD: 0 /100 WBCS — SIGNIFICANT CHANGE UP (ref 0–0)
NRBC # BLD: 0 /100 WBCS — SIGNIFICANT CHANGE UP (ref 0–0)
PHOSPHATE SERPL-MCNC: 3.8 MG/DL — SIGNIFICANT CHANGE UP (ref 2.5–4.5)
PLATELET # BLD AUTO: 191 K/UL — SIGNIFICANT CHANGE UP (ref 150–400)
PLATELET # BLD AUTO: 210 K/UL — SIGNIFICANT CHANGE UP (ref 150–400)
POTASSIUM SERPL-MCNC: 4.1 MMOL/L — SIGNIFICANT CHANGE UP (ref 3.5–5.3)
POTASSIUM SERPL-SCNC: 4.1 MMOL/L — SIGNIFICANT CHANGE UP (ref 3.5–5.3)
RBC # BLD: 3.05 M/UL — LOW (ref 3.8–5.2)
RBC # BLD: 3.14 M/UL — LOW (ref 3.8–5.2)
RBC # FLD: 15 % — HIGH (ref 10.3–14.5)
RBC # FLD: 15 % — HIGH (ref 10.3–14.5)
SODIUM SERPL-SCNC: 142 MMOL/L — SIGNIFICANT CHANGE UP (ref 135–145)
WBC # BLD: 7.5 K/UL — SIGNIFICANT CHANGE UP (ref 3.8–10.5)
WBC # BLD: 8.29 K/UL — SIGNIFICANT CHANGE UP (ref 3.8–10.5)
WBC # FLD AUTO: 7.5 K/UL — SIGNIFICANT CHANGE UP (ref 3.8–10.5)
WBC # FLD AUTO: 8.29 K/UL — SIGNIFICANT CHANGE UP (ref 3.8–10.5)

## 2022-10-29 PROCEDURE — 99232 SBSQ HOSP IP/OBS MODERATE 35: CPT

## 2022-10-29 RX ORDER — INSULIN LISPRO 100/ML
8 VIAL (ML) SUBCUTANEOUS
Refills: 0 | Status: DISCONTINUED | OUTPATIENT
Start: 2022-10-29 | End: 2022-10-29

## 2022-10-29 RX ORDER — INSULIN HUMAN 100 [IU]/ML
10 INJECTION, SOLUTION SUBCUTANEOUS ONCE
Refills: 0 | Status: DISCONTINUED | OUTPATIENT
Start: 2022-10-29 | End: 2022-10-29

## 2022-10-29 RX ORDER — INSULIN GLARGINE 100 [IU]/ML
12 INJECTION, SOLUTION SUBCUTANEOUS AT BEDTIME
Refills: 0 | Status: DISCONTINUED | OUTPATIENT
Start: 2022-10-29 | End: 2022-11-01

## 2022-10-29 RX ORDER — MAGNESIUM OXIDE 400 MG ORAL TABLET 241.3 MG
400 TABLET ORAL ONCE
Refills: 0 | Status: COMPLETED | OUTPATIENT
Start: 2022-10-29 | End: 2022-10-29

## 2022-10-29 RX ORDER — SODIUM,POTASSIUM PHOSPHATES 278-250MG
1 POWDER IN PACKET (EA) ORAL ONCE
Refills: 0 | Status: COMPLETED | OUTPATIENT
Start: 2022-10-29 | End: 2022-10-29

## 2022-10-29 RX ORDER — SODIUM CHLORIDE 9 MG/ML
1000 INJECTION INTRAMUSCULAR; INTRAVENOUS; SUBCUTANEOUS ONCE
Refills: 0 | Status: COMPLETED | OUTPATIENT
Start: 2022-10-29 | End: 2022-10-29

## 2022-10-29 RX ORDER — INSULIN LISPRO 100/ML
10 VIAL (ML) SUBCUTANEOUS
Refills: 0 | Status: DISCONTINUED | OUTPATIENT
Start: 2022-10-29 | End: 2022-11-01

## 2022-10-29 RX ADMIN — TRAMADOL HYDROCHLORIDE 50 MILLIGRAM(S): 50 TABLET ORAL at 13:28

## 2022-10-29 RX ADMIN — Medication 1000 MILLIGRAM(S): at 05:12

## 2022-10-29 RX ADMIN — BRIMONIDINE TARTRATE 1 DROP(S): 2 SOLUTION/ DROPS OPHTHALMIC at 13:32

## 2022-10-29 RX ADMIN — Medication 10 UNIT(S): at 17:32

## 2022-10-29 RX ADMIN — Medication 4: at 12:25

## 2022-10-29 RX ADMIN — ENOXAPARIN SODIUM 40 MILLIGRAM(S): 100 INJECTION SUBCUTANEOUS at 21:24

## 2022-10-29 RX ADMIN — Medication 5 MILLIGRAM(S): at 17:23

## 2022-10-29 RX ADMIN — Medication 5 MILLIGRAM(S): at 21:26

## 2022-10-29 RX ADMIN — Medication 50 MILLIGRAM(S): at 21:26

## 2022-10-29 RX ADMIN — ATORVASTATIN CALCIUM 40 MILLIGRAM(S): 80 TABLET, FILM COATED ORAL at 21:22

## 2022-10-29 RX ADMIN — ONDANSETRON 4 MILLIGRAM(S): 8 TABLET, FILM COATED ORAL at 05:11

## 2022-10-29 RX ADMIN — Medication 4 MILLIGRAM(S): at 05:12

## 2022-10-29 RX ADMIN — Medication 50 MILLIGRAM(S): at 13:32

## 2022-10-29 RX ADMIN — Medication 1000 MILLIGRAM(S): at 22:20

## 2022-10-29 RX ADMIN — Medication 10: at 07:42

## 2022-10-29 RX ADMIN — ONDANSETRON 4 MILLIGRAM(S): 8 TABLET, FILM COATED ORAL at 00:07

## 2022-10-29 RX ADMIN — Medication 5 UNIT(S): at 07:43

## 2022-10-29 RX ADMIN — DORZOLAMIDE HYDROCHLORIDE 1 DROP(S): 20 SOLUTION/ DROPS OPHTHALMIC at 13:32

## 2022-10-29 RX ADMIN — Medication 4 MILLIGRAM(S): at 12:24

## 2022-10-29 RX ADMIN — Medication 8 UNIT(S): at 13:21

## 2022-10-29 RX ADMIN — METHOCARBAMOL 500 MILLIGRAM(S): 500 TABLET, FILM COATED ORAL at 21:23

## 2022-10-29 RX ADMIN — LATANOPROST 1 DROP(S): 0.05 SOLUTION/ DROPS OPHTHALMIC; TOPICAL at 21:25

## 2022-10-29 RX ADMIN — Medication 1000 MILLIGRAM(S): at 14:35

## 2022-10-29 RX ADMIN — METHOCARBAMOL 500 MILLIGRAM(S): 500 TABLET, FILM COATED ORAL at 05:11

## 2022-10-29 RX ADMIN — DORZOLAMIDE HYDROCHLORIDE 1 DROP(S): 20 SOLUTION/ DROPS OPHTHALMIC at 05:14

## 2022-10-29 RX ADMIN — Medication 50 MILLIGRAM(S): at 05:11

## 2022-10-29 RX ADMIN — Medication 5 MILLIGRAM(S): at 05:12

## 2022-10-29 RX ADMIN — Medication 1 TABLET(S): at 07:58

## 2022-10-29 RX ADMIN — Medication 4 MILLIGRAM(S): at 00:07

## 2022-10-29 RX ADMIN — INSULIN GLARGINE 12 UNIT(S): 100 INJECTION, SOLUTION SUBCUTANEOUS at 21:24

## 2022-10-29 RX ADMIN — Medication 1000 MILLIGRAM(S): at 21:22

## 2022-10-29 RX ADMIN — TRAMADOL HYDROCHLORIDE 50 MILLIGRAM(S): 50 TABLET ORAL at 12:25

## 2022-10-29 RX ADMIN — ONDANSETRON 4 MILLIGRAM(S): 8 TABLET, FILM COATED ORAL at 13:33

## 2022-10-29 RX ADMIN — Medication 1000 MILLIGRAM(S): at 13:33

## 2022-10-29 RX ADMIN — Medication 2: at 17:31

## 2022-10-29 RX ADMIN — SENNA PLUS 2 TABLET(S): 8.6 TABLET ORAL at 21:24

## 2022-10-29 RX ADMIN — BRIMONIDINE TARTRATE 1 DROP(S): 2 SOLUTION/ DROPS OPHTHALMIC at 05:13

## 2022-10-29 RX ADMIN — MAGNESIUM OXIDE 400 MG ORAL TABLET 400 MILLIGRAM(S): 241.3 TABLET ORAL at 07:58

## 2022-10-29 RX ADMIN — Medication 4: at 21:24

## 2022-10-29 RX ADMIN — ONDANSETRON 4 MILLIGRAM(S): 8 TABLET, FILM COATED ORAL at 17:23

## 2022-10-29 RX ADMIN — METHOCARBAMOL 500 MILLIGRAM(S): 500 TABLET, FILM COATED ORAL at 13:32

## 2022-10-29 NOTE — PROGRESS NOTE ADULT - SUBJECTIVE AND OBJECTIVE BOX
INTERVAL EVENTS: Still with a lot of pain. BG elevated this AM. Denies fevers, chills, HA, N/V.     PAST MEDICAL & SURGICAL HISTORY:  Poliomyelitis    DM (diabetes mellitus)    Asthma    HTN (hypertension)    Hypercholesterolemia    Colonic polyp    Osteoarthritis    Trigger finger    Patella fracture  left    COVID-19 virus infection    History of colitis    History of urinary incontinence    Spondylolisthesis, lumbar region    Lumbar spondylosis  L5    History of shoulder surgery        MEDICATIONS  (STANDING):  acetaminophen     Tablet .. 1000 milliGRAM(s) Oral every 8 hours  atorvastatin 40 milliGRAM(s) Oral at bedtime  brimonidine 0.2% Ophthalmic Solution 1 Drop(s) Both EYES every 8 hours  dorzolamide 2% Ophthalmic Solution 1 Drop(s) Both EYES every 8 hours  enoxaparin Injectable 40 milliGRAM(s) SubCutaneous <User Schedule>  insulin glargine Injectable (LANTUS) 8 Unit(s) SubCutaneous at bedtime  insulin lispro (ADMELOG) corrective regimen sliding scale   SubCutaneous Before meals and at bedtime  insulin lispro Injectable (ADMELOG) 8 Unit(s) SubCutaneous three times a day before meals  latanoprost 0.005% Ophthalmic Solution 1 Drop(s) Both EYES at bedtime  methocarbamol 500 milliGRAM(s) Oral every 8 hours  ondansetron   Disintegrating Tablet 4 milliGRAM(s) Oral every 6 hours  oxybutynin 5 milliGRAM(s) Oral two times a day  pregabalin 50 milliGRAM(s) Oral three times a day  senna 2 Tablet(s) Oral at bedtime    MEDICATIONS  (PRN):  bisacodyl 5 milliGRAM(s) Oral every 12 hours PRN Constipation  famotidine    Tablet 20 milliGRAM(s) Oral every 12 hours PRN Dyspepsia  traMADol 25 milliGRAM(s) Oral every 6 hours PRN Moderate Pain (4 - 6)  traMADol 50 milliGRAM(s) Oral every 6 hours PRN Severe Pain (7 - 10)    Vital Signs Last 24 Hrs  T(C): 36.9 (29 Oct 2022 08:47), Max: 36.9 (28 Oct 2022 21:10)  T(F): 98.5 (29 Oct 2022 08:47), Max: 98.5 (29 Oct 2022 08:47)  HR: 61 (29 Oct 2022 08:47) (56 - 77)  BP: 121/76 (29 Oct 2022 08:47) (102/64 - 131/73)  BP(mean): --  RR: 16 (29 Oct 2022 08:47) (16 - 20)  SpO2: 97% (29 Oct 2022 08:47) (93% - 98%)    Parameters below as of 29 Oct 2022 08:47  Patient On (Oxygen Delivery Method): room air        PHYSICAL EXAM:  Constitutional: NAD, elderly female lying comfortable in bed.  HEENT: PERRLA, EOMI, no conjunctival pallor or scleral icterus, MMM  Neck: Supple, no JVD  Respiratory: CTA B/L. No w/r/r.   Cardiovascular: RRR, normal S1 and S2, no m/r/g.   Gastrointestinal: +BS, soft NTND, no guarding or rebound tenderness, no palpable masses   Back: Hemovac and ANA LUISA drain with serosanguinous fluid  Extremities: wwp; no cyanosis, clubbing or edema.   Vascular: Pulses equal and strong throughout.   Neurological: AAOx3, no CN deficits, strength 5/5 b/l UEs, 3/5 b/l LEs and sensation intact throughout.   Skin: No gross skin abnormalities or rashes  LABS:                        9.2    8.29  )-----------( 210      ( 29 Oct 2022 06:58 )             28.1     10-29    142  |  103  |  12  ----------------------------<  247<H>  4.1   |  24  |  0.25<L>    Ca    8.4      29 Oct 2022 06:58  Phos  3.8     10-29  Mg     1.9     10-29              I&O's Summary    28 Oct 2022 07:01  -  29 Oct 2022 07:00  --------------------------------------------------------  IN: 400 mL / OUT: 2547 mL / NET: -2147 mL    29 Oct 2022 07:01  -  29 Oct 2022 13:06  --------------------------------------------------------  IN: 420 mL / OUT: 0 mL / NET: 420 mL

## 2022-10-29 NOTE — PROVIDER CONTACT NOTE (OTHER) - ACTION/TREATMENT ORDERED:
Senna 2 tabs and dulcolax 5mg po given in the mean time. Senna 2 tabs and dulcolax 5mg po given in the mean time. Dulcolax suppository one time dose ordered. Will give to pt in AM.

## 2022-10-29 NOTE — PROGRESS NOTE ADULT - SUBJECTIVE AND OBJECTIVE BOX
SUBJECTIVE: Patient states she is feeling well. Denies new symptoms at this time.     HOSPITAL COURSE:  10/26: POD 0 L4-Pelvis fusion. HMV x 2, ANA LUISA x 1  10/27: POD 1 lumbar fusion. JAMAL o/n. Given 1 U prbc for Hgb 6.4. Hemoglobin 8.5 post transfusion. Casie d/c'ed this morning, passed TOV. Pending standing x-rays   10/28: POD 2 L4-S1 fusion. Drains remain, Pending XR. Recieved 1 U prbc for Hgb 7.7. Responded well Hgb increased to 8.8. Hemodynamically stable.   10/29: POD 3 L4-S1 fusion. HMV/ANA LUISA in place. Pending AR     Vital Signs Last 24 Hrs  T(C): 36.3 (29 Oct 2022 00:06), Max: 36.9 (28 Oct 2022 09:55)  T(F): 97.4 (29 Oct 2022 00:06), Max: 98.4 (28 Oct 2022 09:55)  HR: 63 (29 Oct 2022 00:06) (60 - 77)  BP: 105/68 (29 Oct 2022 00:06) (94/56 - 131/73)  BP(mean): --  RR: 16 (29 Oct 2022 00:06) (16 - 20)  SpO2: 93% (29 Oct 2022 00:06) (93% - 98%)    Parameters below as of 29 Oct 2022 00:06  Patient On (Oxygen Delivery Method): nasal cannula  O2 Flow (L/min): 2    I&O's Summary    27 Oct 2022 07:01  -  28 Oct 2022 07:00  --------------------------------------------------------  IN: 0 mL / OUT: 1140 mL / NET: -1140 mL    28 Oct 2022 07:01  -  29 Oct 2022 04:05  --------------------------------------------------------  IN: 0 mL / OUT: 2190 mL / NET: -2190 mL    PHYSICAL EXAM:  General: patient seen laying supine in bed in NAD  Neuro: AAOx3, follows commands, opens eyes spontaneously, speech clear and fluent, face symmetric, b/l upper extremities strength 5/5, b/l lower extremities strength 3/5 HF, 3/5 KF/KE, DF/PF/EHL 5/5. sensation intact to light touch throughout  HEENT: PERRL, EOMI  Neck: supple  Cardiac: RRR, S1S2  Pulmonary: chest rise symmetric  Abdomen: soft, nontender, nondistended  Ext: perfusing well, good capillary response   Skin: warm, dry  Wound: lumbar incision dressing c/d/i, deep HMV x 1, superficial ANA LUISA x 1    LABS:                        9.1    7.50  )-----------( 191      ( 29 Oct 2022 01:31 )             26.9     10-28    146<H>  |  114<H>  |  10  ----------------------------<  208<H>  4.6   |  24  |  0.19<L>    Ca    8.3<L>      28 Oct 2022 05:30  Phos  3.4     10-28  Mg     2.2     10-28    TPro  x   /  Alb  x   /  TBili  0.2  /  DBili  x   /  AST  x   /  ALT  x   /  AlkPhos  x   10-27    PT/INR - ( 27 Oct 2022 07:55 )   PT: 12.2 sec;   INR: 1.02                  CAPILLARY BLOOD GLUCOSE      POCT Blood Glucose.: 231 mg/dL (28 Oct 2022 21:19)  POCT Blood Glucose.: 269 mg/dL (28 Oct 2022 17:36)  POCT Blood Glucose.: 239 mg/dL (28 Oct 2022 12:34)  POCT Blood Glucose.: 193 mg/dL (28 Oct 2022 07:18)      Drug Levels: [] N/A    CSF Analysis: [] N/A      Allergies    No Known Allergies    Intolerances      MEDICATIONS:  Antibiotics:    Neuro:  acetaminophen     Tablet .. 1000 milliGRAM(s) Oral every 8 hours  methocarbamol 500 milliGRAM(s) Oral every 8 hours  ondansetron   Disintegrating Tablet 4 milliGRAM(s) Oral every 6 hours  pregabalin 50 milliGRAM(s) Oral three times a day  traMADol 25 milliGRAM(s) Oral every 6 hours PRN  traMADol 50 milliGRAM(s) Oral every 6 hours PRN    Anticoagulation:  enoxaparin Injectable 40 milliGRAM(s) SubCutaneous <User Schedule>    OTHER:  atorvastatin 40 milliGRAM(s) Oral at bedtime  bisacodyl 5 milliGRAM(s) Oral every 12 hours PRN  brimonidine 0.2% Ophthalmic Solution 1 Drop(s) Both EYES every 8 hours  dexAMETHasone  Injectable 4 milliGRAM(s) IV Push every 6 hours  dorzolamide 2% Ophthalmic Solution 1 Drop(s) Both EYES every 8 hours  famotidine    Tablet 20 milliGRAM(s) Oral every 12 hours PRN  insulin glargine Injectable (LANTUS) 8 Unit(s) SubCutaneous at bedtime  insulin lispro (ADMELOG) corrective regimen sliding scale   SubCutaneous Before meals and at bedtime  insulin lispro Injectable (ADMELOG) 5 Unit(s) SubCutaneous three times a day before meals  latanoprost 0.005% Ophthalmic Solution 1 Drop(s) Both EYES at bedtime  oxybutynin 5 milliGRAM(s) Oral two times a day  senna 2 Tablet(s) Oral at bedtime    IVF:    CULTURES:    RADIOLOGY & ADDITIONAL TESTS:      ASSESSMENT:  62y F with pmh polio with chronic b/l lower extremity weakness, DM, asthma, and HLD now s/p L5-S1 orellana laminectomy, L5-S1 TLIF, L4-S2/AI fusion (10/26).    Plan:  Neuro:  - neuro/vital checks q4  - pain control, ERAS  - pending standing AP/lateral xrays postop  - decadron 4q6 x 3 days  - monitor HMV x 1 and ANA LUISA x 1 drain outputs    Cardiac:  - normotensive BP goal  - Hold home BP meds: HCTZ, amlodipine, losartan    Pulmonary:  - on RA    GI:  - CCD  - bowel regimen    Renal/:  - Na goal 135-145  - cont home oxybutynin    Heme:  - SCDs, SQL for DVT prophylaxis  - s/p 1 U PRBC 10/27, 10/28, H/H stable     Endo:  - lantus 8, lispro 5, Continue ISS  - cont home lipitor    ID:  - Postop Ancef    Disposition:  - Telemetry status, full code  - PT/OT recc AR     D/w Dr. Manzanares

## 2022-10-30 DIAGNOSIS — R10.9 UNSPECIFIED ABDOMINAL PAIN: ICD-10-CM

## 2022-10-30 LAB
ANION GAP SERPL CALC-SCNC: 10 MMOL/L — SIGNIFICANT CHANGE UP (ref 5–17)
BUN SERPL-MCNC: 18 MG/DL — SIGNIFICANT CHANGE UP (ref 7–23)
CALCIUM SERPL-MCNC: 8.2 MG/DL — LOW (ref 8.4–10.5)
CHLORIDE SERPL-SCNC: 108 MMOL/L — SIGNIFICANT CHANGE UP (ref 96–108)
CO2 SERPL-SCNC: 25 MMOL/L — SIGNIFICANT CHANGE UP (ref 22–31)
CREAT SERPL-MCNC: 0.31 MG/DL — LOW (ref 0.5–1.3)
EGFR: 120 ML/MIN/1.73M2 — SIGNIFICANT CHANGE UP
GLUCOSE BLDC GLUCOMTR-MCNC: 112 MG/DL — HIGH (ref 70–99)
GLUCOSE BLDC GLUCOMTR-MCNC: 112 MG/DL — HIGH (ref 70–99)
GLUCOSE BLDC GLUCOMTR-MCNC: 138 MG/DL — HIGH (ref 70–99)
GLUCOSE BLDC GLUCOMTR-MCNC: 165 MG/DL — HIGH (ref 70–99)
GLUCOSE BLDC GLUCOMTR-MCNC: 80 MG/DL — SIGNIFICANT CHANGE UP (ref 70–99)
GLUCOSE SERPL-MCNC: 101 MG/DL — HIGH (ref 70–99)
HCT VFR BLD CALC: 28.6 % — LOW (ref 34.5–45)
HGB BLD-MCNC: 9.6 G/DL — LOW (ref 11.5–15.5)
MAGNESIUM SERPL-MCNC: 2.8 MG/DL — HIGH (ref 1.6–2.6)
MCHC RBC-ENTMCNC: 29.6 PG — SIGNIFICANT CHANGE UP (ref 27–34)
MCHC RBC-ENTMCNC: 33.6 GM/DL — SIGNIFICANT CHANGE UP (ref 32–36)
MCV RBC AUTO: 88.3 FL — SIGNIFICANT CHANGE UP (ref 80–100)
NRBC # BLD: 0 /100 WBCS — SIGNIFICANT CHANGE UP (ref 0–0)
PHOSPHATE SERPL-MCNC: 4.8 MG/DL — HIGH (ref 2.5–4.5)
PLATELET # BLD AUTO: 221 K/UL — SIGNIFICANT CHANGE UP (ref 150–400)
POTASSIUM SERPL-MCNC: 4.1 MMOL/L — SIGNIFICANT CHANGE UP (ref 3.5–5.3)
POTASSIUM SERPL-SCNC: 4.1 MMOL/L — SIGNIFICANT CHANGE UP (ref 3.5–5.3)
RBC # BLD: 3.24 M/UL — LOW (ref 3.8–5.2)
RBC # FLD: 14.6 % — HIGH (ref 10.3–14.5)
SODIUM SERPL-SCNC: 143 MMOL/L — SIGNIFICANT CHANGE UP (ref 135–145)
WBC # BLD: 9.17 K/UL — SIGNIFICANT CHANGE UP (ref 3.8–10.5)
WBC # FLD AUTO: 9.17 K/UL — SIGNIFICANT CHANGE UP (ref 3.8–10.5)

## 2022-10-30 PROCEDURE — 99232 SBSQ HOSP IP/OBS MODERATE 35: CPT

## 2022-10-30 PROCEDURE — 99024 POSTOP FOLLOW-UP VISIT: CPT

## 2022-10-30 PROCEDURE — 74019 RADEX ABDOMEN 2 VIEWS: CPT | Mod: 26

## 2022-10-30 RX ORDER — SODIUM CHLORIDE 9 MG/ML
1000 INJECTION INTRAMUSCULAR; INTRAVENOUS; SUBCUTANEOUS ONCE
Refills: 0 | Status: COMPLETED | OUTPATIENT
Start: 2022-10-30 | End: 2022-10-30

## 2022-10-30 RX ORDER — SIMETHICONE 80 MG/1
80 TABLET, CHEWABLE ORAL EVERY 8 HOURS
Refills: 0 | Status: DISCONTINUED | OUTPATIENT
Start: 2022-10-30 | End: 2022-11-04

## 2022-10-30 RX ADMIN — Medication 1000 MILLIGRAM(S): at 05:24

## 2022-10-30 RX ADMIN — Medication 5 MILLIGRAM(S): at 17:36

## 2022-10-30 RX ADMIN — Medication 10 UNIT(S): at 08:00

## 2022-10-30 RX ADMIN — ENOXAPARIN SODIUM 40 MILLIGRAM(S): 100 INJECTION SUBCUTANEOUS at 22:14

## 2022-10-30 RX ADMIN — SODIUM CHLORIDE 1000 MILLILITER(S): 9 INJECTION INTRAMUSCULAR; INTRAVENOUS; SUBCUTANEOUS at 22:14

## 2022-10-30 RX ADMIN — METHOCARBAMOL 500 MILLIGRAM(S): 500 TABLET, FILM COATED ORAL at 05:24

## 2022-10-30 RX ADMIN — Medication 1000 MILLIGRAM(S): at 14:38

## 2022-10-30 RX ADMIN — DORZOLAMIDE HYDROCHLORIDE 1 DROP(S): 20 SOLUTION/ DROPS OPHTHALMIC at 05:24

## 2022-10-30 RX ADMIN — ONDANSETRON 4 MILLIGRAM(S): 8 TABLET, FILM COATED ORAL at 23:44

## 2022-10-30 RX ADMIN — METHOCARBAMOL 500 MILLIGRAM(S): 500 TABLET, FILM COATED ORAL at 15:30

## 2022-10-30 RX ADMIN — BRIMONIDINE TARTRATE 1 DROP(S): 2 SOLUTION/ DROPS OPHTHALMIC at 05:25

## 2022-10-30 RX ADMIN — Medication 1000 MILLIGRAM(S): at 22:15

## 2022-10-30 RX ADMIN — Medication 50 MILLIGRAM(S): at 05:24

## 2022-10-30 RX ADMIN — Medication 1000 MILLIGRAM(S): at 15:22

## 2022-10-30 RX ADMIN — ONDANSETRON 4 MILLIGRAM(S): 8 TABLET, FILM COATED ORAL at 17:36

## 2022-10-30 RX ADMIN — INSULIN GLARGINE 12 UNIT(S): 100 INJECTION, SOLUTION SUBCUTANEOUS at 22:15

## 2022-10-30 RX ADMIN — TRAMADOL HYDROCHLORIDE 50 MILLIGRAM(S): 50 TABLET ORAL at 06:38

## 2022-10-30 RX ADMIN — SENNA PLUS 2 TABLET(S): 8.6 TABLET ORAL at 22:15

## 2022-10-30 RX ADMIN — BRIMONIDINE TARTRATE 1 DROP(S): 2 SOLUTION/ DROPS OPHTHALMIC at 14:40

## 2022-10-30 RX ADMIN — Medication 50 MILLIGRAM(S): at 14:38

## 2022-10-30 RX ADMIN — ONDANSETRON 4 MILLIGRAM(S): 8 TABLET, FILM COATED ORAL at 05:24

## 2022-10-30 RX ADMIN — SIMETHICONE 80 MILLIGRAM(S): 80 TABLET, CHEWABLE ORAL at 14:38

## 2022-10-30 RX ADMIN — Medication 10 UNIT(S): at 19:19

## 2022-10-30 RX ADMIN — ONDANSETRON 4 MILLIGRAM(S): 8 TABLET, FILM COATED ORAL at 00:22

## 2022-10-30 RX ADMIN — TRAMADOL HYDROCHLORIDE 50 MILLIGRAM(S): 50 TABLET ORAL at 07:30

## 2022-10-30 RX ADMIN — Medication 1000 MILLIGRAM(S): at 23:15

## 2022-10-30 RX ADMIN — Medication 10 MILLIGRAM(S): at 05:23

## 2022-10-30 RX ADMIN — LATANOPROST 1 DROP(S): 0.05 SOLUTION/ DROPS OPHTHALMIC; TOPICAL at 22:16

## 2022-10-30 RX ADMIN — METHOCARBAMOL 500 MILLIGRAM(S): 500 TABLET, FILM COATED ORAL at 22:15

## 2022-10-30 RX ADMIN — ATORVASTATIN CALCIUM 40 MILLIGRAM(S): 80 TABLET, FILM COATED ORAL at 22:15

## 2022-10-30 RX ADMIN — DORZOLAMIDE HYDROCHLORIDE 1 DROP(S): 20 SOLUTION/ DROPS OPHTHALMIC at 14:39

## 2022-10-30 RX ADMIN — Medication 5 MILLIGRAM(S): at 05:24

## 2022-10-30 RX ADMIN — Medication 50 MILLIGRAM(S): at 22:15

## 2022-10-30 NOTE — PROVIDER CONTACT NOTE (OTHER) - ASSESSMENT
Pt desaturated to 85% in room air while asleep. Denied chest pain or sob.
Pt stated her  has been disimpacting her stool every morning for "3 days". Asked if pt can get dulcolax suppository in AM.
Pt's HR went as low as 48 bpm while pt was asleep and awake. Denied chest pain or sob. /71 HR 48 this AM.
KAYCEE Alonzo ordered tap water enema, once. However, dulcolax suppository was given this AM and pt had a formed BM in the toilet. Asked whether tap water enema still needs to be given.
Asymptomatic. Other Vitals stable.

## 2022-10-30 NOTE — PROGRESS NOTE ADULT - SUBJECTIVE AND OBJECTIVE BOX
HPI:  The patient is a 62-year-old woman who works as a hairdresser in [place]. She has a longstanding history of right lower extremity weakness due to childhood illness which is suspicious for polio. She recently has had severe back and leg pain as well as many recent falls. 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 Pt is here for an elective surgery by Dr Manzanares.       (28 Oct 2022 09:05)    OVERNIGHT EVENTS: JAMAL, neuro stable.     Hospital Course:   10/26: POD 0 L4-Pelvis fusion. HMV x 2, ANA LUISA x 1  10/27: POD 1 lumbar fusion. JAMAL o/n. Given 1 U prbc for Hgb 6.4. Hemoglobin 8.5 post transfusion. Jason d/c'ed this morning, passed TOV. Pending standing x-rays   10/28: POD 2 L4-S1 fusion. Drains remain, Pending XR. Recieved 1 U prbc for Hgb 7.7. Responded well Hgb increased to 8.8. Hemodynamically stable.   10/29: POD 3 L4-S1 fusion. HMV/ANA LUISA in place. Pending AR. elevated FS, 10u regular insulin.  10/30: POD 4 L4-S1 fusion. HMV/ANA LUISA in place. Dulcolax suppository ordered for am, manually disimpacted in by  yesterday.     Vital Signs Last 24 Hrs  T(C): 36.9 (30 Oct 2022 00:21), Max: 37.1 (29 Oct 2022 20:55)  T(F): 98.4 (30 Oct 2022 00:21), Max: 98.7 (29 Oct 2022 20:55)  HR: 59 (30 Oct 2022 00:21) (54 - 71)  BP: 115/72 (30 Oct 2022 00:21) (95/57 - 129/80)  BP(mean): --  RR: 16 (30 Oct 2022 00:21) (16 - 17)  SpO2: 96% (30 Oct 2022 00:21) (91% - 97%)    Parameters below as of 30 Oct 2022 00:21  Patient On (Oxygen Delivery Method): nasal cannula  O2 Flow (L/min): 2      I&O's Summary    28 Oct 2022 07:01  -  29 Oct 2022 07:00  --------------------------------------------------------  IN: 400 mL / OUT: 2547 mL / NET: -2147 mL    29 Oct 2022 07:01  -  30 Oct 2022 01:26  --------------------------------------------------------  IN: 1720 mL / OUT: 1164 mL / NET: 556 mL        PHYSICAL EXAM:  General: patient seen laying supine in bed in NAD  Neuro: AAOx3, FC, OE spontaneously, speech clear and fluent, CNII-XI grossly intact, face symmetric, no pronator drift, strength 5/5 b/l UE, b/l lower extremities strength 2/5 HF, 3/5 KF/KE, DF/PF/EHL 5/5. sensation intact to light touch throughout all extremities.   HEENT: PERRL, EOMI  Neck: supple  Cardiac: RRR, S1S2  Pulmonary: chest rise symmetric  Abdomen: soft, nontender, nondistended  Ext: perfusing well  Skin: warm, dry  Wound: Lumbar incision site c/d/i     TUBES/LINES:  [] Jason  [] Lumbar Drain  [x] Wound Drains- ANA LUISA x1 , HMV x 1.   [] Others      DIET:  [] NPO  [x] Mechanical  [] Tube feeds    LABS:                        9.2    8.29  )-----------( 210      ( 29 Oct 2022 06:58 )             28.1     10-29    142  |  103  |  12  ----------------------------<  247<H>  4.1   |  24  |  0.25<L>    Ca    8.4      29 Oct 2022 06:58  Phos  3.8     10-29  Mg     1.9     10-29              CAPILLARY BLOOD GLUCOSE      POCT Blood Glucose.: 207 mg/dL (29 Oct 2022 21:18)  POCT Blood Glucose.: 197 mg/dL (29 Oct 2022 17:17)  POCT Blood Glucose.: 202 mg/dL (29 Oct 2022 12:07)  POCT Blood Glucose.: 191 mg/dL (29 Oct 2022 10:09)  POCT Blood Glucose.: 352 mg/dL (29 Oct 2022 07:41)  POCT Blood Glucose.: 253 mg/dL (29 Oct 2022 06:38)      Drug Levels: [] N/A    CSF Analysis: [] N/A      Allergies    No Known Allergies    Intolerances      MEDICATIONS:  Antibiotics:    Neuro:  acetaminophen     Tablet .. 1000 milliGRAM(s) Oral every 8 hours  methocarbamol 500 milliGRAM(s) Oral every 8 hours  ondansetron   Disintegrating Tablet 4 milliGRAM(s) Oral every 6 hours  pregabalin 50 milliGRAM(s) Oral three times a day  traMADol 25 milliGRAM(s) Oral every 6 hours PRN  traMADol 50 milliGRAM(s) Oral every 6 hours PRN    Anticoagulation:  enoxaparin Injectable 40 milliGRAM(s) SubCutaneous <User Schedule>    OTHER:  atorvastatin 40 milliGRAM(s) Oral at bedtime  bisacodyl 5 milliGRAM(s) Oral every 12 hours PRN  bisacodyl Suppository 10 milliGRAM(s) Rectal once  brimonidine 0.2% Ophthalmic Solution 1 Drop(s) Both EYES every 8 hours  dorzolamide 2% Ophthalmic Solution 1 Drop(s) Both EYES every 8 hours  famotidine    Tablet 20 milliGRAM(s) Oral every 12 hours PRN  insulin glargine Injectable (LANTUS) 12 Unit(s) SubCutaneous at bedtime  insulin lispro (ADMELOG) corrective regimen sliding scale   SubCutaneous Before meals and at bedtime  insulin lispro Injectable (ADMELOG) 10 Unit(s) SubCutaneous three times a day before meals  latanoprost 0.005% Ophthalmic Solution 1 Drop(s) Both EYES at bedtime  oxybutynin 5 milliGRAM(s) Oral two times a day  senna 2 Tablet(s) Oral at bedtime    IVF:    CULTURES:    RADIOLOGY & ADDITIONAL TESTS:      ASSESSMENT:  62y F with pmh polio with chronic b/l lower extremity weakness, DM, asthma, and HLD now s/p L5-S1 orellana laminectomy, L5-S1 TLIF, L4-S2/AI fusion (10/26).    M51.26    Handoff    MEWS Score    Poliomyelitis    DM (diabetes mellitus)    Asthma    HTN (hypertension)    Hypercholesterolemia    Colonic polyp    Osteoarthritis    Trigger finger    Patella fracture    COVID-19 virus infection    History of colitis    History of urinary incontinence    Spondylolisthesis, lumbar region    Lumbar spondylosis    Lumbar spondylosis    Lumbar spondylolysis    Lumbar spondylolysis    Lumbar spondylosis    Poliomyelitis    DM (diabetes mellitus)    Asthma    HTN (hypertension)    Hypercholesterolemia    Acute blood loss anemia    Prophylactic measure    Suspected deep vein thrombosis (DVT)    Fusion, spine, lumbar, interbody, 1-2 levels, transforaminal approach    Fusion of posterior lumbar spine    History of shoulder surgery    [email or web address]        PLAN:  Neuro:  - neuro/vital checks q4  - pain control, ERAS  - postop xrays complete  - decadron 4q6 x 3 days  - monitor HMV x 1 and ANA LUISA x 1 drain outputs    Cardiac:  - normotensive BP goal  - Hold home BP meds: HCTZ, amlodipine, losartan    Pulmonary:  - on RA    GI:  - CCD  - bowel regimen  - manually disimpacted 10/29    Renal/:  - Na goal 135-145  - cont home oxybutynin    Heme:  - SCDs, SQL for DVT prophylaxis  - s/p 1 U PRBC 10/27, 10/28, H/H stable     Endo:  - lantus 12, lispro 10, Continue ISS  - cont home lipitor    ID:  - Postop Ancef    Disposition:  - Telemetry status, full code  - PT/OT recc AR     D/w Dr. Manzanares        Assessment:  Present when checked    []  GCS  E   V  M     Heart Failure: []Acute, [] acute on chronic , []chronic  Heart Failure:  [] Diastolic (HFpEF), [] Systolic (HFrEF), []Combined (HFpEF and HFrEF), [] RHF, [] Pulm HTN, [] Other    [] DERICK, [] ATN, [] AIN, [] other  [] CKD1, [] CKD2, [] CKD 3, [] CKD 4, [] CKD 5, []ESRD    Encephalopathy: [] Metabolic, [] Hepatic, [] toxic, [] Neurological, [] Other    Abnormal Nurtitional Status: [] malnurtition (see nutrition note), [ ]underweight: BMI < 19, [] morbid obesity: BMI >40, [] Cachexia    [] Sepsis  [] hypovolemic shock,[] cardiogenic shock, [] hemorrhagic shock, [] neuogenic shock  [] Acute Respiratory Failure  []Cerebral edema, [] Brain compression/ herniation,   [] Functional quadriplegia  [] Acute blood loss anemia

## 2022-10-30 NOTE — PROGRESS NOTE ADULT - SUBJECTIVE AND OBJECTIVE BOX
INTERVAL EVENTS: Had a BM this AM. Now has some diffuse abdominal pain. Passing gas     PAST MEDICAL & SURGICAL HISTORY:  Poliomyelitis    DM (diabetes mellitus)    Asthma    HTN (hypertension)    Hypercholesterolemia    Colonic polyp    Osteoarthritis    Trigger finger    Patella fracture  left    COVID-19 virus infection    History of colitis    History of urinary incontinence    Spondylolisthesis, lumbar region    Lumbar spondylosis  L5    History of shoulder surgery        MEDICATIONS  (STANDING):  acetaminophen     Tablet .. 1000 milliGRAM(s) Oral every 8 hours  atorvastatin 40 milliGRAM(s) Oral at bedtime  brimonidine 0.2% Ophthalmic Solution 1 Drop(s) Both EYES every 8 hours  dorzolamide 2% Ophthalmic Solution 1 Drop(s) Both EYES every 8 hours  enoxaparin Injectable 40 milliGRAM(s) SubCutaneous <User Schedule>  insulin glargine Injectable (LANTUS) 12 Unit(s) SubCutaneous at bedtime  insulin lispro (ADMELOG) corrective regimen sliding scale   SubCutaneous Before meals and at bedtime  insulin lispro Injectable (ADMELOG) 10 Unit(s) SubCutaneous three times a day before meals  latanoprost 0.005% Ophthalmic Solution 1 Drop(s) Both EYES at bedtime  methocarbamol 500 milliGRAM(s) Oral every 8 hours  ondansetron   Disintegrating Tablet 4 milliGRAM(s) Oral every 6 hours  oxybutynin 5 milliGRAM(s) Oral two times a day  pregabalin 50 milliGRAM(s) Oral three times a day  senna 2 Tablet(s) Oral at bedtime    MEDICATIONS  (PRN):  bisacodyl 5 milliGRAM(s) Oral every 12 hours PRN Constipation  famotidine    Tablet 20 milliGRAM(s) Oral every 12 hours PRN Dyspepsia  traMADol 25 milliGRAM(s) Oral every 6 hours PRN Moderate Pain (4 - 6)  traMADol 50 milliGRAM(s) Oral every 6 hours PRN Severe Pain (7 - 10)    Vital Signs Last 24 Hrs  T(C): 36.4 (30 Oct 2022 05:23), Max: 37.1 (29 Oct 2022 20:55)  T(F): 97.6 (30 Oct 2022 05:23), Max: 98.7 (29 Oct 2022 20:55)  HR: 48 (30 Oct 2022 05:23) (48 - 71)  BP: 120/71 (30 Oct 2022 05:23) (95/57 - 125/74)  BP(mean): --  RR: 16 (30 Oct 2022 05:23) (16 - 17)  SpO2: 96% (30 Oct 2022 05:23) (91% - 96%)    Parameters below as of 30 Oct 2022 05:23  Patient On (Oxygen Delivery Method): nasal cannula  O2 Flow (L/min): 2      PHYSICAL EXAM:  GEN: Awake, alert. NAD.   HEENT: NCAT, PERRL, EOMI. Mucosa moist. No JVD.  RESP: CTA b/l  CV: RRR. Normal S1/S2. No m/r/g.  ABD: Soft, diffusely tender to palpation   EXT: Warm. No edema, clubbing, or cyanosis.   NEURO: AAOx3.    LABS:                        9.6    9.17  )-----------( 221      ( 30 Oct 2022 05:46 )             28.6     10-30    143  |  108  |  18  ----------------------------<  101<H>  4.1   |  25  |  0.31<L>    Ca    8.2<L>      30 Oct 2022 05:46  Phos  4.8     10-30  Mg     2.8     10-30              I&O's Summary    29 Oct 2022 07:01  -  30 Oct 2022 07:00  --------------------------------------------------------  IN: 1820 mL / OUT: 1601 mL / NET: 219 mL    30 Oct 2022 07:01  -  30 Oct 2022 12:20  --------------------------------------------------------  IN: 590 mL / OUT: 0 mL / NET: 590 mL

## 2022-10-30 NOTE — PROCEDURE NOTE - ADDITIONAL PROCEDURE DETAILS
Drain taken off suction. Site cleaned with chlorhexidine. Anchoring suture cut. Drain pulled with no resistance. Steristrips/gauze/tegaderm placed at drain exit site.

## 2022-10-31 LAB
ANION GAP SERPL CALC-SCNC: 10 MMOL/L — SIGNIFICANT CHANGE UP (ref 5–17)
BUN SERPL-MCNC: 14 MG/DL — SIGNIFICANT CHANGE UP (ref 7–23)
CALCIUM SERPL-MCNC: 8.2 MG/DL — LOW (ref 8.4–10.5)
CHLORIDE SERPL-SCNC: 106 MMOL/L — SIGNIFICANT CHANGE UP (ref 96–108)
CO2 SERPL-SCNC: 25 MMOL/L — SIGNIFICANT CHANGE UP (ref 22–31)
CREAT SERPL-MCNC: 0.22 MG/DL — LOW (ref 0.5–1.3)
EGFR: 131 ML/MIN/1.73M2 — SIGNIFICANT CHANGE UP
GLUCOSE BLDC GLUCOMTR-MCNC: 106 MG/DL — HIGH (ref 70–99)
GLUCOSE BLDC GLUCOMTR-MCNC: 131 MG/DL — HIGH (ref 70–99)
GLUCOSE BLDC GLUCOMTR-MCNC: 144 MG/DL — HIGH (ref 70–99)
GLUCOSE BLDC GLUCOMTR-MCNC: 146 MG/DL — HIGH (ref 70–99)
GLUCOSE SERPL-MCNC: 164 MG/DL — HIGH (ref 70–99)
HCT VFR BLD CALC: 31 % — LOW (ref 34.5–45)
HGB BLD-MCNC: 10.3 G/DL — LOW (ref 11.5–15.5)
MAGNESIUM SERPL-MCNC: 2.1 MG/DL — SIGNIFICANT CHANGE UP (ref 1.6–2.6)
MCHC RBC-ENTMCNC: 29.2 PG — SIGNIFICANT CHANGE UP (ref 27–34)
MCHC RBC-ENTMCNC: 33.2 GM/DL — SIGNIFICANT CHANGE UP (ref 32–36)
MCV RBC AUTO: 87.8 FL — SIGNIFICANT CHANGE UP (ref 80–100)
NRBC # BLD: 0 /100 WBCS — SIGNIFICANT CHANGE UP (ref 0–0)
PHOSPHATE SERPL-MCNC: 4 MG/DL — SIGNIFICANT CHANGE UP (ref 2.5–4.5)
PLATELET # BLD AUTO: 256 K/UL — SIGNIFICANT CHANGE UP (ref 150–400)
POTASSIUM SERPL-MCNC: 4.1 MMOL/L — SIGNIFICANT CHANGE UP (ref 3.5–5.3)
POTASSIUM SERPL-SCNC: 4.1 MMOL/L — SIGNIFICANT CHANGE UP (ref 3.5–5.3)
RBC # BLD: 3.53 M/UL — LOW (ref 3.8–5.2)
RBC # FLD: 14.5 % — SIGNIFICANT CHANGE UP (ref 10.3–14.5)
SODIUM SERPL-SCNC: 141 MMOL/L — SIGNIFICANT CHANGE UP (ref 135–145)
WBC # BLD: 7.09 K/UL — SIGNIFICANT CHANGE UP (ref 3.8–10.5)
WBC # FLD AUTO: 7.09 K/UL — SIGNIFICANT CHANGE UP (ref 3.8–10.5)

## 2022-10-31 PROCEDURE — 99232 SBSQ HOSP IP/OBS MODERATE 35: CPT

## 2022-10-31 RX ORDER — METHOCARBAMOL 500 MG/1
500 TABLET, FILM COATED ORAL EVERY 8 HOURS
Refills: 0 | Status: DISCONTINUED | OUTPATIENT
Start: 2022-10-31 | End: 2022-11-04

## 2022-10-31 RX ORDER — SODIUM CHLORIDE 9 MG/ML
500 INJECTION INTRAMUSCULAR; INTRAVENOUS; SUBCUTANEOUS ONCE
Refills: 0 | Status: COMPLETED | OUTPATIENT
Start: 2022-10-31 | End: 2022-10-31

## 2022-10-31 RX ADMIN — DORZOLAMIDE HYDROCHLORIDE 1 DROP(S): 20 SOLUTION/ DROPS OPHTHALMIC at 22:05

## 2022-10-31 RX ADMIN — Medication 1000 MILLIGRAM(S): at 14:46

## 2022-10-31 RX ADMIN — ATORVASTATIN CALCIUM 40 MILLIGRAM(S): 80 TABLET, FILM COATED ORAL at 22:03

## 2022-10-31 RX ADMIN — Medication 50 MILLIGRAM(S): at 14:50

## 2022-10-31 RX ADMIN — ONDANSETRON 4 MILLIGRAM(S): 8 TABLET, FILM COATED ORAL at 12:55

## 2022-10-31 RX ADMIN — ENOXAPARIN SODIUM 40 MILLIGRAM(S): 100 INJECTION SUBCUTANEOUS at 22:02

## 2022-10-31 RX ADMIN — SODIUM CHLORIDE 500 MILLILITER(S): 9 INJECTION INTRAMUSCULAR; INTRAVENOUS; SUBCUTANEOUS at 08:32

## 2022-10-31 RX ADMIN — Medication 5 MILLIGRAM(S): at 19:13

## 2022-10-31 RX ADMIN — Medication 1000 MILLIGRAM(S): at 15:20

## 2022-10-31 RX ADMIN — Medication 1000 MILLIGRAM(S): at 23:00

## 2022-10-31 RX ADMIN — Medication 5 MILLIGRAM(S): at 05:21

## 2022-10-31 RX ADMIN — LATANOPROST 1 DROP(S): 0.05 SOLUTION/ DROPS OPHTHALMIC; TOPICAL at 22:34

## 2022-10-31 RX ADMIN — SENNA PLUS 2 TABLET(S): 8.6 TABLET ORAL at 22:03

## 2022-10-31 RX ADMIN — Medication 1000 MILLIGRAM(S): at 05:21

## 2022-10-31 RX ADMIN — TRAMADOL HYDROCHLORIDE 50 MILLIGRAM(S): 50 TABLET ORAL at 22:03

## 2022-10-31 RX ADMIN — Medication 10 UNIT(S): at 17:11

## 2022-10-31 RX ADMIN — Medication 50 MILLIGRAM(S): at 05:21

## 2022-10-31 RX ADMIN — Medication 1000 MILLIGRAM(S): at 22:03

## 2022-10-31 RX ADMIN — ONDANSETRON 4 MILLIGRAM(S): 8 TABLET, FILM COATED ORAL at 19:13

## 2022-10-31 RX ADMIN — BRIMONIDINE TARTRATE 1 DROP(S): 2 SOLUTION/ DROPS OPHTHALMIC at 22:05

## 2022-10-31 RX ADMIN — DORZOLAMIDE HYDROCHLORIDE 1 DROP(S): 20 SOLUTION/ DROPS OPHTHALMIC at 14:51

## 2022-10-31 RX ADMIN — METHOCARBAMOL 500 MILLIGRAM(S): 500 TABLET, FILM COATED ORAL at 05:21

## 2022-10-31 RX ADMIN — ONDANSETRON 4 MILLIGRAM(S): 8 TABLET, FILM COATED ORAL at 05:21

## 2022-10-31 RX ADMIN — INSULIN GLARGINE 12 UNIT(S): 100 INJECTION, SOLUTION SUBCUTANEOUS at 22:02

## 2022-10-31 RX ADMIN — BRIMONIDINE TARTRATE 1 DROP(S): 2 SOLUTION/ DROPS OPHTHALMIC at 05:22

## 2022-10-31 RX ADMIN — DORZOLAMIDE HYDROCHLORIDE 1 DROP(S): 20 SOLUTION/ DROPS OPHTHALMIC at 05:22

## 2022-10-31 RX ADMIN — Medication 10 UNIT(S): at 12:54

## 2022-10-31 RX ADMIN — Medication 50 MILLIGRAM(S): at 22:02

## 2022-10-31 RX ADMIN — BRIMONIDINE TARTRATE 1 DROP(S): 2 SOLUTION/ DROPS OPHTHALMIC at 14:51

## 2022-10-31 RX ADMIN — TRAMADOL HYDROCHLORIDE 50 MILLIGRAM(S): 50 TABLET ORAL at 23:00

## 2022-10-31 NOTE — PROGRESS NOTE PEDS - PROBLEM SELECTOR PLAN 1
-s/p L4-S2/AI fusion (10/26)  -pain control per neurosurgery team  -Steroids and drain management per primary team  -PT recs AR, referral sent

## 2022-10-31 NOTE — PROGRESS NOTE PEDS - PROBLEM SELECTOR PLAN 3
Initial Hgb drop to 6.4 from baseline 8.2.  Resolved  - s/p 1upRBC 10/28. Hb has remained stable 9-10  -trend Hgb  -keep active Type and Screen  -keep 2 large bore IVs

## 2022-10-31 NOTE — PROVIDER CONTACT NOTE (CHANGE IN STATUS NOTIFICATION) - SITUATION
Laboratory notified that magnesium (2.8) result from yesterday (10/30/22) was incorrect due to machine malfunctioning. Correct value is 2.10

## 2022-10-31 NOTE — PROGRESS NOTE PEDS - SUBJECTIVE AND OBJECTIVE BOX
SUBJECTIVE:  Patient seen and examined at bedside.  Patient reports that abdominal pain from yesterday resolved s/p BM.  Continues to have back pain with movement but overall feeling better.  Has drank about 3 17oz water bottles yesterday, about .5 of one so far today.    ROS:  Denies fevers, chills, headache, vision changes, neck pain, cough, SOB, chest pain, Abdominal pain, N/V, dysuria or new rash.  All other ROS negative except as above    Vital Signs Last 12 Hrs  T(F): 97.7 (10-31-22 @ 08:14), Max: 97.9 (10-31-22 @ 05:15)  HR: 80 (10-31-22 @ 09:53) (63 - 80)  BP: 107/53 (10-31-22 @ 09:53) (85/53 - 107/53)  BP(mean): --  RR: 18 (10-31-22 @ 09:53) (16 - 18)  SpO2: 98% (10-31-22 @ 09:53) (93% - 98%)  I&O's Summary    30 Oct 2022 07:01  -  31 Oct 2022 07:00  --------------------------------------------------------  IN: 2850 mL / OUT: 2210 mL / NET: 640 mL        PHYSICAL EXAM:  Constitutional: NAD, comfortable in bed.  HEENT: PERRLA, EOMI, no conjunctival pallor or scleral icterus, MMM  Neck: Supple, no JVD  Respiratory: CTA B/L. No w/r/r.   Cardiovascular: RRR, normal S1 and S2, no m/r/g.   Gastrointestinal: +BS, soft NTND, no guarding or rebound tenderness, no palpable masses   Extremities: wwp; no cyanosis, clubbing or edema.   Vascular: Pulses equal and strong throughout.   Neurological: AAOx3, strength and sensation intact throughout.   Skin: No gross skin abnormalities or rashes        LABS:                        10.3   7.09  )-----------( 256      ( 31 Oct 2022 06:37 )             31.0     10-31    141  |  106  |  14  ----------------------------<  164<H>  4.1   |  25  |  0.22<L>    Ca    8.2<L>      31 Oct 2022 06:37  Phos  4.0     10-31  Mg     2.1     10-31              RADIOLOGY & ADDITIONAL TESTS:  < from: Xray Abdomen 2 Views (10.30.22 @ 14:09) >  IMPRESSION:    Nonspecific bowel gas pattern with no bowel distention or bowel   obstruction. Spinal/pelvic hardware. Overlying drain. Lung bases clear.   Degenerative changes lumbar spine.    < end of copied text >      MEDICATIONS  (STANDING):  acetaminophen     Tablet .. 1000 milliGRAM(s) Oral every 8 hours  atorvastatin 40 milliGRAM(s) Oral at bedtime  brimonidine 0.2% Ophthalmic Solution 1 Drop(s) Both EYES every 8 hours  dorzolamide 2% Ophthalmic Solution 1 Drop(s) Both EYES every 8 hours  enoxaparin Injectable 40 milliGRAM(s) SubCutaneous <User Schedule>  insulin glargine Injectable (LANTUS) 12 Unit(s) SubCutaneous at bedtime  insulin lispro (ADMELOG) corrective regimen sliding scale   SubCutaneous Before meals and at bedtime  insulin lispro Injectable (ADMELOG) 10 Unit(s) SubCutaneous three times a day before meals  latanoprost 0.005% Ophthalmic Solution 1 Drop(s) Both EYES at bedtime  ondansetron   Disintegrating Tablet 4 milliGRAM(s) Oral every 6 hours  oxybutynin 5 milliGRAM(s) Oral two times a day  pregabalin 50 milliGRAM(s) Oral three times a day  senna 2 Tablet(s) Oral at bedtime    MEDICATIONS  (PRN):  bisacodyl 5 milliGRAM(s) Oral every 12 hours PRN Constipation  famotidine    Tablet 20 milliGRAM(s) Oral every 12 hours PRN Dyspepsia  methocarbamol 500 milliGRAM(s) Oral every 8 hours PRN Muscle Spasm  simethicone 80 milliGRAM(s) Chew every 8 hours PRN Gas  traMADol 25 milliGRAM(s) Oral every 6 hours PRN Moderate Pain (4 - 6)  traMADol 50 milliGRAM(s) Oral every 6 hours PRN Severe Pain (7 - 10)

## 2022-10-31 NOTE — DIETITIAN INITIAL EVALUATION ADULT - OTHER INFO
62y F with pmh polio with chronic b/l lower extremity weakness, DM, asthma, and HLD now s/p L5-S1 orellana laminectomy, L5-S1 TLIF, L4-S2/AI fusion (10/26).    Pt seen resting in bed. Speaks Romanian,  obtained, ID [id number]. Denies n/v/d. Last BM 10/30. Noted on senna, was given dulcolax suppository on 10/30. Endorses indigestion, abd cramping, so limited information obtained to allow pt to rest. On CST CHO diet, reports good PO intake, >50%. No cultural, ethnic, Sabianist food preferences noted. NKFA. States UBW of 114 lbs, consistent with CBW of 115 lbs. Unsure of previous intake but pt reports PO dependent on abd discomfort. RN made aware of current abd symptoms for management. Skin: Koko 19, surgical incision noted. RD to follow.

## 2022-10-31 NOTE — DIETITIAN INITIAL EVALUATION ADULT - PERTINENT LABORATORY DATA
10-31    141  |  106  |  14  ----------------------------<  164<H>  4.1   |  25  |  0.22<L>    Ca    8.2<L>      31 Oct 2022 06:37  Phos  4.0     10-31  Mg     2.1     10-31    POCT Blood Glucose.: 106 mg/dL (10-31-22 @ 12:04)  A1C with Estimated Average Glucose Result: 6.3 % (10-27-22 @ 07:55)

## 2022-10-31 NOTE — DIETITIAN INITIAL EVALUATION ADULT - PERTINENT MEDS FT
MEDICATIONS  (STANDING):  acetaminophen     Tablet .. 1000 milliGRAM(s) Oral every 8 hours  atorvastatin 40 milliGRAM(s) Oral at bedtime  brimonidine 0.2% Ophthalmic Solution 1 Drop(s) Both EYES every 8 hours  dorzolamide 2% Ophthalmic Solution 1 Drop(s) Both EYES every 8 hours  enoxaparin Injectable 40 milliGRAM(s) SubCutaneous <User Schedule>  insulin glargine Injectable (LANTUS) 12 Unit(s) SubCutaneous at bedtime  insulin lispro (ADMELOG) corrective regimen sliding scale   SubCutaneous Before meals and at bedtime  insulin lispro Injectable (ADMELOG) 10 Unit(s) SubCutaneous three times a day before meals  latanoprost 0.005% Ophthalmic Solution 1 Drop(s) Both EYES at bedtime  ondansetron   Disintegrating Tablet 4 milliGRAM(s) Oral every 6 hours  oxybutynin 5 milliGRAM(s) Oral two times a day  pregabalin 50 milliGRAM(s) Oral three times a day  senna 2 Tablet(s) Oral at bedtime    MEDICATIONS  (PRN):  bisacodyl 5 milliGRAM(s) Oral every 12 hours PRN Constipation  famotidine    Tablet 20 milliGRAM(s) Oral every 12 hours PRN Dyspepsia  methocarbamol 500 milliGRAM(s) Oral every 8 hours PRN Muscle Spasm  simethicone 80 milliGRAM(s) Chew every 8 hours PRN Gas  traMADol 25 milliGRAM(s) Oral every 6 hours PRN Moderate Pain (4 - 6)  traMADol 50 milliGRAM(s) Oral every 6 hours PRN Severe Pain (7 - 10)

## 2022-10-31 NOTE — DIETITIAN INITIAL EVALUATION ADULT - ADD RECOMMEND
1. CST CHO diet  >> [place] pt food preferences as able  2. BM and pain regimen per team  3. Monitor BMP, BG, POCT, lytes, replete prn  4. Diet edu prn

## 2022-10-31 NOTE — PROGRESS NOTE PEDS - PROBLEM SELECTOR PLAN 4
A1c 6.3 on Metformin and Januvia at home  -currently exacerbated by decadron, now at goal  -Continue Lantus 12 units and lispro premeal 10 units TID  -MISS

## 2022-10-31 NOTE — DIETITIAN INITIAL EVALUATION ADULT - OTHER CALCULATIONS
IBW 92 lbs. %%. IBW used to calculate energy needs due to pt's current body weight exceeding 120% of IBW. Needs adjusted for age and wt, post op demand

## 2022-10-31 NOTE — PROGRESS NOTE ADULT - SUBJECTIVE AND OBJECTIVE BOX
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 She has a longstanding history of right lower extremity weakness due to childhood illness which is suspicious for polio. She recently has had severe back and leg pain as well as many recent falls. 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 Pt is here for an elective surgery by Dr Manzanares.       (28 Oct 2022 09:05)    OVERNIGHT EVENTS: JAMAL overnight     HOSPITAL COURSE:   10/26: POD 0 L4-Pelvis fusion. HMV x 2, ANA LUISA x 1  10/27: POD 1 lumbar fusion. JAMAL o/n. Given 1 U prbc for Hgb 6.4. Hemoglobin 8.5 post transfusion. Jason d/c'ed this morning, passed TOV. Pending standing x-rays   10/28: POD 2 L4-S1 fusion. Drains remain, Pending XR. Recieved 1 U prbc for Hgb 7.7. Responded well Hgb increased to 8.8. Hemodynamically stable.   10/29: POD 3 L4-S1 fusion. HMV/ANA LUISA in place. Pending AR. elevated FS, 10u regular insulin.  10/30: POD 4 L4-S1 fusion. HMV/ANA LIUSA in place. Dulcolax suppository ordered for am, manually disimpacted in by  yesterday.   10/31: POD5. JAMAL overnight       Vital Signs Last 24 Hrs  T(C): 36.6 (30 Oct 2022 23:44), Max: 37.1 (30 Oct 2022 13:58)  T(F): 97.8 (30 Oct 2022 23:44), Max: 98.8 (30 Oct 2022 13:58)  HR: 60 (30 Oct 2022 23:44) (47 - 66)  BP: 97/62 (30 Oct 2022 23:44) (87/64 - 122/76)  BP(mean): --  RR: 18 (30 Oct 2022 23:44) (16 - 20)  SpO2: 94% (30 Oct 2022 23:44) (91% - 96%)    Parameters below as of 30 Oct 2022 23:44  Patient On (Oxygen Delivery Method): room air        I&O's Summary    29 Oct 2022 07:01  -  30 Oct 2022 07:00  --------------------------------------------------------  IN: 1820 mL / OUT: 1601 mL / NET: 219 mL    30 Oct 2022 07:01  -  31 Oct 2022 01:36  --------------------------------------------------------  IN: 2850 mL / OUT: 1635 mL / NET: 1215 mL        PHYSICAL EXAM:  General: NAD, pt is comfortably sitting up in bed, on room air  HEENT: EOMI b/l, face symmetric, tongue midline, neck FROM  Cardiovascular: Regular rate and rhythm  Respiratory: nonlabored breathing, normal chest rise  GI: abdomen soft, nontender, nondistended  Neuro: CN II-XII grossly intact, PERRL 3mm briskly reactive   A&Ox3, No aphasia, speech clear, no dysmetria, no pronator drift. Follows commands.  MCCLENDON x4 spontaneously, UE 5/5, b/l LE 2/5 HF, 3/5 KF/KE, DF/PF/EHL 5/5.  Extremities: distal pulses 2+ x4      TUBES/LINES:  [] Jason  [] Wound Drains  [] Others      DIET:  [] NPO  [x] Mechanical  [] Tube feeds    LABS:                        9.6    9.17  )-----------( 221      ( 30 Oct 2022 05:46 )             28.6     10-30    143  |  108  |  18  ----------------------------<  101<H>  4.1   |  25  |  0.31<L>    Ca    8.2<L>      30 Oct 2022 05:46  Phos  4.8     10-30  Mg     2.8     10-30              CAPILLARY BLOOD GLUCOSE      POCT Blood Glucose.: 112 mg/dL (30 Oct 2022 21:50)  POCT Blood Glucose.: 165 mg/dL (30 Oct 2022 18:03)  POCT Blood Glucose.: 80 mg/dL (30 Oct 2022 11:41)  POCT Blood Glucose.: 138 mg/dL (30 Oct 2022 08:58)  POCT Blood Glucose.: 112 mg/dL (30 Oct 2022 07:11)      Drug Levels: [] N/A    CSF Analysis: [] N/A      Allergies    No Known Allergies    Intolerances      MEDICATIONS:  Antibiotics:    Neuro:  acetaminophen     Tablet .. 1000 milliGRAM(s) Oral every 8 hours  methocarbamol 500 milliGRAM(s) Oral every 8 hours  ondansetron   Disintegrating Tablet 4 milliGRAM(s) Oral every 6 hours  pregabalin 50 milliGRAM(s) Oral three times a day  traMADol 25 milliGRAM(s) Oral every 6 hours PRN  traMADol 50 milliGRAM(s) Oral every 6 hours PRN    Anticoagulation:  enoxaparin Injectable 40 milliGRAM(s) SubCutaneous <User Schedule>    OTHER:  atorvastatin 40 milliGRAM(s) Oral at bedtime  bisacodyl 5 milliGRAM(s) Oral every 12 hours PRN  brimonidine 0.2% Ophthalmic Solution 1 Drop(s) Both EYES every 8 hours  dorzolamide 2% Ophthalmic Solution 1 Drop(s) Both EYES every 8 hours  famotidine    Tablet 20 milliGRAM(s) Oral every 12 hours PRN  insulin glargine Injectable (LANTUS) 12 Unit(s) SubCutaneous at bedtime  insulin lispro (ADMELOG) corrective regimen sliding scale   SubCutaneous Before meals and at bedtime  insulin lispro Injectable (ADMELOG) 10 Unit(s) SubCutaneous three times a day before meals  latanoprost 0.005% Ophthalmic Solution 1 Drop(s) Both EYES at bedtime  oxybutynin 5 milliGRAM(s) Oral two times a day  senna 2 Tablet(s) Oral at bedtime  simethicone 80 milliGRAM(s) Chew every 8 hours PRN    IVF:    CULTURES:    RADIOLOGY & ADDITIONAL TESTS:  < from: CT 3D Reconstruct w/o Workstation (08.31.22 @ 10:16) >  IMPRESSION:  1.  Grade 1 anterolisthesis at L4-L5 due to bilateral pars   interarticularis defects at L5. Compensatory grade 1 retrolisthesis at   L4-L5.  2.  Facet arthrosis most severe at L4-L5.          ASSESSMENT:  62y F with pmh polio with chronic b/l lower extremity weakness, DM, asthma, and HLD now s/p L5-S1 orellana laminectomy, L5-S1 TLIF, L4-S2/AI fusion (10/26).          PLAN:  Neuro:  - neuro checks/vital signs q4hrs   - pain control, ERAS  - postop xrays complete  - decadron 4q6 x 3 days  - monitor HMV x1  - ANA LUISA x1 removed 10/30    Cardiac:  - SBP goal 100-160  - Hold home BP meds: HCTZ, amlodipine, losartan    Pulmonary:  - on RA, IS    GI:  - carb consistent diet   - bowel regimen  - manually disimpacted 10/29    Renal/:  - IVL, voiding  - cont home oxybutynin 5mg BID    Heme:  - SCDs, SQL for DVT prophylaxis  - s/p 1 U PRBC 10/27, 10/28, H/H stable     Endo:  - ISS, A1C 6.3  - lantus 12u, lispro 10u  - cont home lipitor 40mg     ID:  - afebrile, no leukocytosis    Disposition: SDU status, full code, PT/OT recc AR     Assessment and Plan d/w Dr. 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 She has a longstanding history of right lower extremity weakness due to childhood illness which is suspicious for polio. She recently has had severe back and leg pain as well as many recent falls. 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 Pt is here for an elective surgery by Dr Manzanares.       (28 Oct 2022 09:05)    OVERNIGHT EVENTS: JAMAL overnight     HOSPITAL COURSE:   10/26: POD 0 L4-Pelvis fusion. HMV x 2, ANA LUISA x 1  10/27: POD 1 lumbar fusion. JAMAL o/n. Given 1 U prbc for Hgb 6.4. Hemoglobin 8.5 post transfusion. Jason d/c'ed this morning, passed TOV. Pending standing x-rays   10/28: POD 2 L4-S1 fusion. Drains remain, Pending XR. Recieved 1 U prbc for Hgb 7.7. Responded well Hgb increased to 8.8. Hemodynamically stable.   10/29: POD 3 L4-S1 fusion. HMV/ANA LUISA in place. Pending AR. elevated FS, 10u regular insulin.  10/30: POD 4 L4-S1 fusion. HMV/ANA LUISA in place. Dulcolax suppository ordered for am, manually disimpacted in by  yesterday.   10/31: POD5. JAMAL overnight       Vital Signs Last 24 Hrs  T(C): 36.6 (30 Oct 2022 23:44), Max: 37.1 (30 Oct 2022 13:58)  T(F): 97.8 (30 Oct 2022 23:44), Max: 98.8 (30 Oct 2022 13:58)  HR: 60 (30 Oct 2022 23:44) (47 - 66)  BP: 97/62 (30 Oct 2022 23:44) (87/64 - 122/76)  BP(mean): --  RR: 18 (30 Oct 2022 23:44) (16 - 20)  SpO2: 94% (30 Oct 2022 23:44) (91% - 96%)    Parameters below as of 30 Oct 2022 23:44  Patient On (Oxygen Delivery Method): room air        I&O's Summary    29 Oct 2022 07:01  -  30 Oct 2022 07:00  --------------------------------------------------------  IN: 1820 mL / OUT: 1601 mL / NET: 219 mL    30 Oct 2022 07:01  -  31 Oct 2022 01:36  --------------------------------------------------------  IN: 2850 mL / OUT: 1635 mL / NET: 1215 mL        PHYSICAL EXAM:  General: NAD, pt is comfortably sitting up in bed, on room air  HEENT: EOMI b/l, face symmetric, tongue midline, neck FROM  Cardiovascular: Regular rate and rhythm  Respiratory: nonlabored breathing, normal chest rise  GI: abdomen soft, nontender, nondistended  Neuro: CN II-XII grossly intact, PERRL 3mm briskly reactive   A&Ox3, No aphasia, speech clear, no dysmetria, no pronator drift. Follows commands.  MCCLENDON x4 spontaneously, UE 5/5, b/l LE 2/5 HF, 3/5 KF/KE, DF/PF/EHL 5/5.  Extremities: distal pulses 2+ x4      TUBES/LINES:  [] Jason  [x] Wound Drains: HMVx1  [] Others      DIET:  [] NPO  [x] Mechanical  [] Tube feeds    LABS:                        9.6    9.17  )-----------( 221      ( 30 Oct 2022 05:46 )             28.6     10-30    143  |  108  |  18  ----------------------------<  101<H>  4.1   |  25  |  0.31<L>    Ca    8.2<L>      30 Oct 2022 05:46  Phos  4.8     10-30  Mg     2.8     10-30              CAPILLARY BLOOD GLUCOSE      POCT Blood Glucose.: 112 mg/dL (30 Oct 2022 21:50)  POCT Blood Glucose.: 165 mg/dL (30 Oct 2022 18:03)  POCT Blood Glucose.: 80 mg/dL (30 Oct 2022 11:41)  POCT Blood Glucose.: 138 mg/dL (30 Oct 2022 08:58)  POCT Blood Glucose.: 112 mg/dL (30 Oct 2022 07:11)      Drug Levels: [] N/A    CSF Analysis: [] N/A      Allergies    No Known Allergies    Intolerances      MEDICATIONS:  Antibiotics:    Neuro:  acetaminophen     Tablet .. 1000 milliGRAM(s) Oral every 8 hours  methocarbamol 500 milliGRAM(s) Oral every 8 hours  ondansetron   Disintegrating Tablet 4 milliGRAM(s) Oral every 6 hours  pregabalin 50 milliGRAM(s) Oral three times a day  traMADol 25 milliGRAM(s) Oral every 6 hours PRN  traMADol 50 milliGRAM(s) Oral every 6 hours PRN    Anticoagulation:  enoxaparin Injectable 40 milliGRAM(s) SubCutaneous <User Schedule>    OTHER:  atorvastatin 40 milliGRAM(s) Oral at bedtime  bisacodyl 5 milliGRAM(s) Oral every 12 hours PRN  brimonidine 0.2% Ophthalmic Solution 1 Drop(s) Both EYES every 8 hours  dorzolamide 2% Ophthalmic Solution 1 Drop(s) Both EYES every 8 hours  famotidine    Tablet 20 milliGRAM(s) Oral every 12 hours PRN  insulin glargine Injectable (LANTUS) 12 Unit(s) SubCutaneous at bedtime  insulin lispro (ADMELOG) corrective regimen sliding scale   SubCutaneous Before meals and at bedtime  insulin lispro Injectable (ADMELOG) 10 Unit(s) SubCutaneous three times a day before meals  latanoprost 0.005% Ophthalmic Solution 1 Drop(s) Both EYES at bedtime  oxybutynin 5 milliGRAM(s) Oral two times a day  senna 2 Tablet(s) Oral at bedtime  simethicone 80 milliGRAM(s) Chew every 8 hours PRN    IVF:    CULTURES:    RADIOLOGY & ADDITIONAL TESTS:  < from: CT 3D Reconstruct w/o Workstation (08.31.22 @ 10:16) >  IMPRESSION:  1.  Grade 1 anterolisthesis at L4-L5 due to bilateral pars   interarticularis defects at L5. Compensatory grade 1 retrolisthesis at   L4-L5.  2.  Facet arthrosis most severe at L4-L5.          ASSESSMENT:  62y F with pmh polio with chronic b/l lower extremity weakness, DM, asthma, and HLD now s/p L5-S1 orellana laminectomy, L5-S1 TLIF, L4-S2/AI fusion (10/26).          PLAN:  Neuro:  - neuro checks/vital signs q4hrs   - pain control, ERAS  - postop xrays complete  - decadron 4q6 x 3 days  - monitor HMV x1  - ANA LUISA x1 removed 10/30    Cardiac:  - SBP goal 100-160  - Hold home BP meds: HCTZ, amlodipine, losartan    Pulmonary:  - on RA, IS    GI:  - carb consistent diet   - bowel regimen  - manually disimpacted 10/29    Renal/:  - IVL, voiding  - cont home oxybutynin 5mg BID    Heme:  - SCDs, SQL for DVT prophylaxis  - s/p 1 U PRBC 10/27, 10/28, H/H stable     Endo:  - ISS, A1C 6.3  - lantus 12u, lispro 10u  - cont home lipitor 40mg     ID:  - afebrile, no leukocytosis    Disposition: SDU status, full code, PT/OT recc AR     Assessment and Plan d/w Dr. Manzanares

## 2022-11-01 LAB
ANION GAP SERPL CALC-SCNC: 8 MMOL/L — SIGNIFICANT CHANGE UP (ref 5–17)
BUN SERPL-MCNC: 17 MG/DL — SIGNIFICANT CHANGE UP (ref 7–23)
CALCIUM SERPL-MCNC: 8.4 MG/DL — SIGNIFICANT CHANGE UP (ref 8.4–10.5)
CHLORIDE SERPL-SCNC: 103 MMOL/L — SIGNIFICANT CHANGE UP (ref 96–108)
CO2 SERPL-SCNC: 27 MMOL/L — SIGNIFICANT CHANGE UP (ref 22–31)
CREAT SERPL-MCNC: 0.22 MG/DL — LOW (ref 0.5–1.3)
EGFR: 131 ML/MIN/1.73M2 — SIGNIFICANT CHANGE UP
GLUCOSE BLDC GLUCOMTR-MCNC: 105 MG/DL — HIGH (ref 70–99)
GLUCOSE BLDC GLUCOMTR-MCNC: 184 MG/DL — HIGH (ref 70–99)
GLUCOSE BLDC GLUCOMTR-MCNC: 71 MG/DL — SIGNIFICANT CHANGE UP (ref 70–99)
GLUCOSE BLDC GLUCOMTR-MCNC: 78 MG/DL — SIGNIFICANT CHANGE UP (ref 70–99)
GLUCOSE SERPL-MCNC: 110 MG/DL — HIGH (ref 70–99)
HCT VFR BLD CALC: 32.3 % — LOW (ref 34.5–45)
HGB BLD-MCNC: 10.5 G/DL — LOW (ref 11.5–15.5)
MAGNESIUM SERPL-MCNC: 1.9 MG/DL — SIGNIFICANT CHANGE UP (ref 1.6–2.6)
MCHC RBC-ENTMCNC: 29.2 PG — SIGNIFICANT CHANGE UP (ref 27–34)
MCHC RBC-ENTMCNC: 32.5 GM/DL — SIGNIFICANT CHANGE UP (ref 32–36)
MCV RBC AUTO: 89.7 FL — SIGNIFICANT CHANGE UP (ref 80–100)
NRBC # BLD: 0 /100 WBCS — SIGNIFICANT CHANGE UP (ref 0–0)
PHOSPHATE SERPL-MCNC: 4.5 MG/DL — SIGNIFICANT CHANGE UP (ref 2.5–4.5)
PLATELET # BLD AUTO: 286 K/UL — SIGNIFICANT CHANGE UP (ref 150–400)
POTASSIUM SERPL-MCNC: 4 MMOL/L — SIGNIFICANT CHANGE UP (ref 3.5–5.3)
POTASSIUM SERPL-SCNC: 4 MMOL/L — SIGNIFICANT CHANGE UP (ref 3.5–5.3)
RBC # BLD: 3.6 M/UL — LOW (ref 3.8–5.2)
RBC # FLD: 14.4 % — SIGNIFICANT CHANGE UP (ref 10.3–14.5)
SODIUM SERPL-SCNC: 138 MMOL/L — SIGNIFICANT CHANGE UP (ref 135–145)
WBC # BLD: 6.73 K/UL — SIGNIFICANT CHANGE UP (ref 3.8–10.5)
WBC # FLD AUTO: 6.73 K/UL — SIGNIFICANT CHANGE UP (ref 3.8–10.5)

## 2022-11-01 PROCEDURE — 99232 SBSQ HOSP IP/OBS MODERATE 35: CPT

## 2022-11-01 RX ORDER — SODIUM CHLORIDE 9 MG/ML
500 INJECTION INTRAMUSCULAR; INTRAVENOUS; SUBCUTANEOUS ONCE
Refills: 0 | Status: COMPLETED | OUTPATIENT
Start: 2022-11-01 | End: 2022-11-01

## 2022-11-01 RX ORDER — TRAMADOL HYDROCHLORIDE 50 MG/1
50 TABLET ORAL ONCE
Refills: 0 | Status: DISCONTINUED | OUTPATIENT
Start: 2022-11-01 | End: 2022-11-01

## 2022-11-01 RX ORDER — INSULIN LISPRO 100/ML
5 VIAL (ML) SUBCUTANEOUS ONCE
Refills: 0 | Status: COMPLETED | OUTPATIENT
Start: 2022-11-01 | End: 2022-11-01

## 2022-11-01 RX ADMIN — METHOCARBAMOL 500 MILLIGRAM(S): 500 TABLET, FILM COATED ORAL at 17:20

## 2022-11-01 RX ADMIN — TRAMADOL HYDROCHLORIDE 50 MILLIGRAM(S): 50 TABLET ORAL at 00:36

## 2022-11-01 RX ADMIN — TRAMADOL HYDROCHLORIDE 50 MILLIGRAM(S): 50 TABLET ORAL at 21:30

## 2022-11-01 RX ADMIN — TRAMADOL HYDROCHLORIDE 50 MILLIGRAM(S): 50 TABLET ORAL at 13:12

## 2022-11-01 RX ADMIN — Medication 1000 MILLIGRAM(S): at 17:55

## 2022-11-01 RX ADMIN — Medication 5 MILLIGRAM(S): at 05:36

## 2022-11-01 RX ADMIN — SODIUM CHLORIDE 500 MILLILITER(S): 9 INJECTION INTRAMUSCULAR; INTRAVENOUS; SUBCUTANEOUS at 14:20

## 2022-11-01 RX ADMIN — TRAMADOL HYDROCHLORIDE 50 MILLIGRAM(S): 50 TABLET ORAL at 13:50

## 2022-11-01 RX ADMIN — BRIMONIDINE TARTRATE 1 DROP(S): 2 SOLUTION/ DROPS OPHTHALMIC at 05:36

## 2022-11-01 RX ADMIN — ONDANSETRON 4 MILLIGRAM(S): 8 TABLET, FILM COATED ORAL at 19:46

## 2022-11-01 RX ADMIN — ATORVASTATIN CALCIUM 40 MILLIGRAM(S): 80 TABLET, FILM COATED ORAL at 22:29

## 2022-11-01 RX ADMIN — Medication 1000 MILLIGRAM(S): at 17:21

## 2022-11-01 RX ADMIN — Medication 5 MILLIGRAM(S): at 17:21

## 2022-11-01 RX ADMIN — ONDANSETRON 4 MILLIGRAM(S): 8 TABLET, FILM COATED ORAL at 13:06

## 2022-11-01 RX ADMIN — TRAMADOL HYDROCHLORIDE 50 MILLIGRAM(S): 50 TABLET ORAL at 01:36

## 2022-11-01 RX ADMIN — METHOCARBAMOL 500 MILLIGRAM(S): 500 TABLET, FILM COATED ORAL at 00:09

## 2022-11-01 RX ADMIN — DORZOLAMIDE HYDROCHLORIDE 1 DROP(S): 20 SOLUTION/ DROPS OPHTHALMIC at 05:36

## 2022-11-01 RX ADMIN — Medication 2: at 17:23

## 2022-11-01 RX ADMIN — Medication 10 UNIT(S): at 17:24

## 2022-11-01 RX ADMIN — DORZOLAMIDE HYDROCHLORIDE 1 DROP(S): 20 SOLUTION/ DROPS OPHTHALMIC at 22:28

## 2022-11-01 RX ADMIN — ENOXAPARIN SODIUM 40 MILLIGRAM(S): 100 INJECTION SUBCUTANEOUS at 22:29

## 2022-11-01 RX ADMIN — Medication 5 MILLIGRAM(S): at 19:46

## 2022-11-01 RX ADMIN — Medication 5 UNIT(S): at 13:06

## 2022-11-01 RX ADMIN — ONDANSETRON 4 MILLIGRAM(S): 8 TABLET, FILM COATED ORAL at 05:36

## 2022-11-01 RX ADMIN — Medication 10 UNIT(S): at 07:51

## 2022-11-01 RX ADMIN — DORZOLAMIDE HYDROCHLORIDE 1 DROP(S): 20 SOLUTION/ DROPS OPHTHALMIC at 15:00

## 2022-11-01 RX ADMIN — Medication 50 MILLIGRAM(S): at 05:36

## 2022-11-01 RX ADMIN — TRAMADOL HYDROCHLORIDE 50 MILLIGRAM(S): 50 TABLET ORAL at 20:41

## 2022-11-01 RX ADMIN — Medication 50 MILLIGRAM(S): at 17:20

## 2022-11-01 RX ADMIN — Medication 1000 MILLIGRAM(S): at 05:35

## 2022-11-01 RX ADMIN — LATANOPROST 1 DROP(S): 0.05 SOLUTION/ DROPS OPHTHALMIC; TOPICAL at 22:35

## 2022-11-01 RX ADMIN — BRIMONIDINE TARTRATE 1 DROP(S): 2 SOLUTION/ DROPS OPHTHALMIC at 15:00

## 2022-11-01 RX ADMIN — SENNA PLUS 2 TABLET(S): 8.6 TABLET ORAL at 22:29

## 2022-11-01 RX ADMIN — BRIMONIDINE TARTRATE 1 DROP(S): 2 SOLUTION/ DROPS OPHTHALMIC at 22:28

## 2022-11-01 NOTE — PROGRESS NOTE ADULT - SUBJECTIVE AND OBJECTIVE BOX
SUBJECTIVE:  Patient seen and examined at bedside.  Resting comfortably.  Reports that she had high back pain 8-9/10 overnight but now improved.  Last BM yesterday AM.  Feels she has more energy today    ROS:  Denies fevers, chills, headache, vision changes, neck pain, cough, SOB, chest pain, Abdominal pain, N/V, dysuria or new rash.  All other ROS negative except as above    Vital Signs Last 12 Hrs  T(F): 98.2 (11-01-22 @ 09:15), Max: 98.2 (11-01-22 @ 09:15)  HR: 68 (11-01-22 @ 09:15) (60 - 68)  BP: 104/65 (11-01-22 @ 09:15) (101/67 - 104/65)  BP(mean): --  RR: 18 (11-01-22 @ 09:15) (18 - 18)  SpO2: 96% (11-01-22 @ 09:15) (95% - 96%)  I&O's Summary    31 Oct 2022 07:01  -  01 Nov 2022 07:00  --------------------------------------------------------  IN: 0 mL / OUT: 1270 mL / NET: -1270 mL        PHYSICAL EXAM:  Constitutional: NAD, comfortable lying in bed.  HEENT: PERRLA, EOMI, no conjunctival pallor or scleral icterus, MMM  Neck: Supple, no JVD  Respiratory: CTA B/L. No w/r/r.   Cardiovascular: RRR, normal S1 and S2, no m/r/g.   Gastrointestinal: +BS, soft NTND, no guarding or rebound tenderness, no palpable masses   Back: + hemovac  Extremities: wwp; no cyanosis, clubbing or edema.   Vascular: Pulses equal and strong throughout.   Neurological: AAOx3, strength 5/5 b/l UEs, 3/5 b/l LEs and sensation intact throughout.   Skin: No gross skin abnormalities or rashes        LABS:                        10.5   6.73  )-----------( 286      ( 01 Nov 2022 05:30 )             32.3     11-01    138  |  103  |  17  ----------------------------<  110<H>  4.0   |  27  |  0.22<L>    Ca    8.4      01 Nov 2022 05:30  Phos  4.5     11-01  Mg     1.9     11-01              RADIOLOGY & ADDITIONAL TESTS:  No new imaging    MEDICATIONS  (STANDING):  acetaminophen     Tablet .. 1000 milliGRAM(s) Oral every 8 hours  atorvastatin 40 milliGRAM(s) Oral at bedtime  brimonidine 0.2% Ophthalmic Solution 1 Drop(s) Both EYES every 8 hours  dorzolamide 2% Ophthalmic Solution 1 Drop(s) Both EYES every 8 hours  enoxaparin Injectable 40 milliGRAM(s) SubCutaneous <User Schedule>  insulin glargine Injectable (LANTUS) 12 Unit(s) SubCutaneous at bedtime  insulin lispro (ADMELOG) corrective regimen sliding scale   SubCutaneous Before meals and at bedtime  insulin lispro Injectable (ADMELOG) 10 Unit(s) SubCutaneous three times a day before meals  latanoprost 0.005% Ophthalmic Solution 1 Drop(s) Both EYES at bedtime  ondansetron   Disintegrating Tablet 4 milliGRAM(s) Oral every 6 hours  oxybutynin 5 milliGRAM(s) Oral two times a day  pregabalin 50 milliGRAM(s) Oral three times a day  senna 2 Tablet(s) Oral at bedtime    MEDICATIONS  (PRN):  bisacodyl 5 milliGRAM(s) Oral every 12 hours PRN Constipation  famotidine    Tablet 20 milliGRAM(s) Oral every 12 hours PRN Dyspepsia  methocarbamol 500 milliGRAM(s) Oral every 8 hours PRN Muscle Spasm  simethicone 80 milliGRAM(s) Chew every 8 hours PRN Gas  traMADol 25 milliGRAM(s) Oral every 6 hours PRN Moderate Pain (4 - 6)  traMADol 50 milliGRAM(s) Oral every 6 hours PRN Severe Pain (7 - 10)

## 2022-11-01 NOTE — PROVIDER CONTACT NOTE (OTHER) - ACTION/TREATMENT ORDERED:
KAYCEE Stubbs aware of pt back pain and numbness. KAYCEE Pace aware of pt BP and last dose of Tramadol. KAYCEE Pace verbalized to give prn Robaxin and placed a stat order for Tramadol 50mg PO.

## 2022-11-01 NOTE — PROGRESS NOTE ADULT - SUBJECTIVE AND OBJECTIVE BOX
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 She recently has had severe back and leg pain as well as many recent falls. 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 Pt is here for an elective surgery by Dr Manzanares.       (28 Oct 2022 09:05)    OVERNIGHT EVENTS: given additional 50mg tramadol for pain     HOSPITAL COURSE:  10/26: POD 0 L4-Pelvis fusion. HMV x 2, ANA LUISA x 1  10/27: POD 1 lumbar fusion. JAMAL o/n. Given 1 U prbc for Hgb 6.4. Hemoglobin 8.5 post transfusion. Jason d/c'ed this morning, passed TOV. Pending standing x-rays   10/28: POD 2 L4-S1 fusion. Drains remain, Pending XR. Recieved 1 U prbc for Hgb 7.7. Responded well Hgb increased to 8.8. Hemodynamically stable.   10/29: POD 3 L4-S1 fusion. HMV/ANA LUISA in place. Pending AR. elevated FS, 10u regular insulin.  10/30: POD 4 L4-S1 fusion. HMV/ANA LUISA in place. Dulcolax suppository ordered for am, manually disimpacted in by  yesterday.   10/31: POD5. JAMAL overnight. SBP 85 this morning 500c bolus given  11/1: POD6. given additional 50mg tramadol for pain        Vital Signs Last 24 Hrs  T(C): 36.7 (31 Oct 2022 23:32), Max: 36.8 (31 Oct 2022 20:08)  T(F): 98.1 (31 Oct 2022 23:32), Max: 98.2 (31 Oct 2022 20:08)  HR: 65 (31 Oct 2022 23:32) (63 - 80)  BP: 101/67 (31 Oct 2022 23:32) (85/53 - 107/71)  BP(mean): --  RR: 18 (31 Oct 2022 23:32) (16 - 18)  SpO2: 96% (31 Oct 2022 23:32) (93% - 98%)    Parameters below as of 31 Oct 2022 23:32  Patient On (Oxygen Delivery Method): room air        I&O's Summary    30 Oct 2022 07:01  -  31 Oct 2022 07:00  --------------------------------------------------------  IN: 2850 mL / OUT: 2210 mL / NET: 640 mL    31 Oct 2022 07:01  -  01 Nov 2022 01:19  --------------------------------------------------------  IN: 0 mL / OUT: 900 mL / NET: -900 mL        PHYSICAL EXAM:  General: NAD, pt is comfortably sitting up in bed, on room air  HEENT: EOMI b/l, face symmetric, tongue midline, neck FROM  Cardiovascular: Regular rate and rhythm  Respiratory: nonlabored breathing, normal chest rise  GI: abdomen soft, nontender, nondistended  Neuro: CN II-XII grossly intact, PERRL 3mm briskly reactive   A&Ox3, No aphasia, speech clear, no dysmetria, no pronator drift. Follows commands.  MCCLENDON x4 spontaneously, UE 5/5, b/l LE 2/5 HF, 3/5 KF/KE, DF/PF/EHL 5/5.  Extremities: distal pulses 2+ x4        TUBES/LINES:  [] Jason  [] Wound Drains  [] Others      DIET:  [] NPO  [x] Mechanical  [] Tube feeds    LABS:                        10.3   7.09  )-----------( 256      ( 31 Oct 2022 06:37 )             31.0     10-31    141  |  106  |  14  ----------------------------<  164<H>  4.1   |  25  |  0.22<L>    Ca    8.2<L>      31 Oct 2022 06:37  Phos  4.0     10-31  Mg     2.1     10-31              CAPILLARY BLOOD GLUCOSE      POCT Blood Glucose.: 146 mg/dL (31 Oct 2022 21:45)  POCT Blood Glucose.: 131 mg/dL (31 Oct 2022 16:21)  POCT Blood Glucose.: 106 mg/dL (31 Oct 2022 12:04)  POCT Blood Glucose.: 144 mg/dL (31 Oct 2022 07:22)      Drug Levels: [] N/A    CSF Analysis: [] N/A      Allergies    No Known Allergies    Intolerances      MEDICATIONS:  Antibiotics:    Neuro:  acetaminophen     Tablet .. 1000 milliGRAM(s) Oral every 8 hours  methocarbamol 500 milliGRAM(s) Oral every 8 hours PRN  ondansetron   Disintegrating Tablet 4 milliGRAM(s) Oral every 6 hours  pregabalin 50 milliGRAM(s) Oral three times a day  traMADol 50 milliGRAM(s) Oral every 6 hours PRN  traMADol 25 milliGRAM(s) Oral every 6 hours PRN    Anticoagulation:  enoxaparin Injectable 40 milliGRAM(s) SubCutaneous <User Schedule>    OTHER:  atorvastatin 40 milliGRAM(s) Oral at bedtime  bisacodyl 5 milliGRAM(s) Oral every 12 hours PRN  brimonidine 0.2% Ophthalmic Solution 1 Drop(s) Both EYES every 8 hours  dorzolamide 2% Ophthalmic Solution 1 Drop(s) Both EYES every 8 hours  famotidine    Tablet 20 milliGRAM(s) Oral every 12 hours PRN  insulin glargine Injectable (LANTUS) 12 Unit(s) SubCutaneous at bedtime  insulin lispro (ADMELOG) corrective regimen sliding scale   SubCutaneous Before meals and at bedtime  insulin lispro Injectable (ADMELOG) 10 Unit(s) SubCutaneous three times a day before meals  latanoprost 0.005% Ophthalmic Solution 1 Drop(s) Both EYES at bedtime  oxybutynin 5 milliGRAM(s) Oral two times a day  senna 2 Tablet(s) Oral at bedtime  simethicone 80 milliGRAM(s) Chew every 8 hours PRN    IVF:    CULTURES:    RADIOLOGY & ADDITIONAL TESTS:  < from: Xray Abdomen 2 Views (10.30.22 @ 14:09) >  IMPRESSION:    Nonspecific bowel gas pattern with no bowel distention or bowel   obstruction. Spinal/pelvic hardware. Overlying drain. Lung bases clear.   Degenerative changes lumbar spine.      < from: CT Lumbar Spine No Cont (08.31.22 @ 10:15) >    IMPRESSION:  1.  Grade 1 anterolisthesis at L4-L5 due to bilateral pars   interarticularis defects at L5. Compensatory grade 1 retrolisthesis at   L4-L5.  2.  Facet arthrosis most severe at L4-L5.          ASSESSMENT:  62y F with pmh polio with chronic b/l lower extremity weakness, DM, asthma, and HLD now s/p L5-S1 orellana laminectomy, L5-S1 TLIF, L4-S2/AI fusion (10/26).      PLAN:    Neuro:  - neuro checks/vital signs q4hrs   - pain control, ERAS  - postop xrays complete  - decadron 4q6 x 3 days  - monitor HMV x1  - ANA LUISA x1 removed 10/30    Cardiac:  - SBP goal 100-160  - Hold home BP meds: HCTZ, amlodipine, losartan    Pulmonary:  - on RA, IS    GI:  - carb consistent diet   - bowel regimen  - manually disimpacted 10/29    Renal/:  - IVL, voiding  - cont home oxybutynin 5mg BID    Heme:  - SCDs, SQL for DVT prophylaxis  - s/p 1 U PRBC 10/27, 10/28, H/H stable     Endo:  - ISS, A1C 6.3  - lantus 12u, lispro 10u  - cont home lipitor 40mg     ID:  - afebrile, no leukocytosis    Disposition: SDU status, full code, PT/OT recc AR     Assessment and Plan d/w Dr. Manzanares

## 2022-11-01 NOTE — PROVIDER CONTACT NOTE (OTHER) - DATE AND TIME:
30-Oct-2022 06:59
27-Oct-2022 01:24
29-Oct-2022 00:55
29-Oct-2022 21:22
30-Oct-2022 05:37
01-Nov-2022 00:01

## 2022-11-01 NOTE — PROVIDER CONTACT NOTE (OTHER) - SITUATION
Pt verbalized feeling back pain and numbness down to the right leg.
Pt SBP in the 80's and low 90's. Pt also desats in the high 80's when asleep

## 2022-11-01 NOTE — PROVIDER CONTACT NOTE (OTHER) - NAME OF MD/NP/PA/DO NOTIFIED:
Johanna Lewis, PA
KAYCEE Griffin
KAYCEE Griffin
KAYCEE Stubbs via phone [phone]
Cesar Baig
KAYCEE Griffin

## 2022-11-01 NOTE — CHART NOTE - NSCHARTNOTEFT_GEN_A_CORE
POD6. given additional 50mg tramadol for pain, pending post-op lumbar spine CT, had BM yesterday, HMV output 100/120. Pending AR
POD 1 lumbar fusion. JAMAL o/n. Given 1 U prbc for Hgb 6.4. Hemoglobin 8.5 post transfusion. Casie harden/jo'ed this morning, passed TOV. Pending standing x-rays
Patient can tolerate 3 hours of PT and OT at acute rehab.

## 2022-11-02 DIAGNOSIS — I95.1 ORTHOSTATIC HYPOTENSION: ICD-10-CM

## 2022-11-02 LAB
ANION GAP SERPL CALC-SCNC: 8 MMOL/L — SIGNIFICANT CHANGE UP (ref 5–17)
BILIRUB SERPL-MCNC: 0.3 MG/DL — SIGNIFICANT CHANGE UP (ref 0.2–1.2)
BUN SERPL-MCNC: 12 MG/DL — SIGNIFICANT CHANGE UP (ref 7–23)
CALCIUM SERPL-MCNC: 8.7 MG/DL — SIGNIFICANT CHANGE UP (ref 8.4–10.5)
CHLORIDE SERPL-SCNC: 107 MMOL/L — SIGNIFICANT CHANGE UP (ref 96–108)
CO2 SERPL-SCNC: 26 MMOL/L — SIGNIFICANT CHANGE UP (ref 22–31)
CREAT SERPL-MCNC: 0.26 MG/DL — LOW (ref 0.5–1.3)
EGFR: 126 ML/MIN/1.73M2 — SIGNIFICANT CHANGE UP
GLUCOSE BLDC GLUCOMTR-MCNC: 124 MG/DL — HIGH (ref 70–99)
GLUCOSE BLDC GLUCOMTR-MCNC: 132 MG/DL — HIGH (ref 70–99)
GLUCOSE BLDC GLUCOMTR-MCNC: 142 MG/DL — HIGH (ref 70–99)
GLUCOSE BLDC GLUCOMTR-MCNC: 185 MG/DL — HIGH (ref 70–99)
GLUCOSE BLDC GLUCOMTR-MCNC: 223 MG/DL — HIGH (ref 70–99)
GLUCOSE SERPL-MCNC: 114 MG/DL — HIGH (ref 70–99)
HCT VFR BLD CALC: 33.2 % — LOW (ref 34.5–45)
HGB BLD-MCNC: 10.8 G/DL — LOW (ref 11.5–15.5)
INR BLD: 0.93 — SIGNIFICANT CHANGE UP (ref 0.88–1.16)
MAGNESIUM SERPL-MCNC: 2.1 MG/DL — SIGNIFICANT CHANGE UP (ref 1.6–2.6)
MCHC RBC-ENTMCNC: 29.4 PG — SIGNIFICANT CHANGE UP (ref 27–34)
MCHC RBC-ENTMCNC: 32.5 GM/DL — SIGNIFICANT CHANGE UP (ref 32–36)
MCV RBC AUTO: 90.5 FL — SIGNIFICANT CHANGE UP (ref 80–100)
MELD SCORE WITH DIALYSIS: 20 POINTS — SIGNIFICANT CHANGE UP
MELD SCORE WITHOUT DIALYSIS: 6 POINTS — SIGNIFICANT CHANGE UP
NRBC # BLD: 0 /100 WBCS — SIGNIFICANT CHANGE UP (ref 0–0)
PHOSPHATE SERPL-MCNC: 4.3 MG/DL — SIGNIFICANT CHANGE UP (ref 2.5–4.5)
PLATELET # BLD AUTO: 319 K/UL — SIGNIFICANT CHANGE UP (ref 150–400)
POTASSIUM SERPL-MCNC: 5 MMOL/L — SIGNIFICANT CHANGE UP (ref 3.5–5.3)
POTASSIUM SERPL-SCNC: 5 MMOL/L — SIGNIFICANT CHANGE UP (ref 3.5–5.3)
PROTHROM AB SERPL-ACNC: 11.1 SEC — SIGNIFICANT CHANGE UP (ref 10.5–13.4)
RBC # BLD: 3.67 M/UL — LOW (ref 3.8–5.2)
RBC # FLD: 14.6 % — HIGH (ref 10.3–14.5)
SARS-COV-2 RNA SPEC QL NAA+PROBE: SIGNIFICANT CHANGE UP
SODIUM SERPL-SCNC: 141 MMOL/L — SIGNIFICANT CHANGE UP (ref 135–145)
WBC # BLD: 7.29 K/UL — SIGNIFICANT CHANGE UP (ref 3.8–10.5)
WBC # FLD AUTO: 7.29 K/UL — SIGNIFICANT CHANGE UP (ref 3.8–10.5)

## 2022-11-02 PROCEDURE — 72131 CT LUMBAR SPINE W/O DYE: CPT | Mod: 26

## 2022-11-02 PROCEDURE — 99233 SBSQ HOSP IP/OBS HIGH 50: CPT

## 2022-11-02 PROCEDURE — 72100 X-RAY EXAM L-S SPINE 2/3 VWS: CPT | Mod: 26

## 2022-11-02 RX ORDER — SODIUM CHLORIDE 9 MG/ML
500 INJECTION INTRAMUSCULAR; INTRAVENOUS; SUBCUTANEOUS ONCE
Refills: 0 | Status: COMPLETED | OUTPATIENT
Start: 2022-11-02 | End: 2022-11-02

## 2022-11-02 RX ADMIN — SODIUM CHLORIDE 1000 MILLILITER(S): 9 INJECTION INTRAMUSCULAR; INTRAVENOUS; SUBCUTANEOUS at 20:18

## 2022-11-02 RX ADMIN — Medication 5 MILLIGRAM(S): at 14:22

## 2022-11-02 RX ADMIN — LATANOPROST 1 DROP(S): 0.05 SOLUTION/ DROPS OPHTHALMIC; TOPICAL at 21:26

## 2022-11-02 RX ADMIN — Medication 5 MILLIGRAM(S): at 17:45

## 2022-11-02 RX ADMIN — BRIMONIDINE TARTRATE 1 DROP(S): 2 SOLUTION/ DROPS OPHTHALMIC at 07:01

## 2022-11-02 RX ADMIN — DORZOLAMIDE HYDROCHLORIDE 1 DROP(S): 20 SOLUTION/ DROPS OPHTHALMIC at 21:08

## 2022-11-02 RX ADMIN — Medication 5 MILLIGRAM(S): at 07:00

## 2022-11-02 RX ADMIN — TRAMADOL HYDROCHLORIDE 50 MILLIGRAM(S): 50 TABLET ORAL at 07:00

## 2022-11-02 RX ADMIN — Medication 1000 MILLIGRAM(S): at 20:18

## 2022-11-02 RX ADMIN — Medication 2: at 12:26

## 2022-11-02 RX ADMIN — BRIMONIDINE TARTRATE 1 DROP(S): 2 SOLUTION/ DROPS OPHTHALMIC at 21:08

## 2022-11-02 RX ADMIN — Medication 1000 MILLIGRAM(S): at 00:41

## 2022-11-02 RX ADMIN — SENNA PLUS 2 TABLET(S): 8.6 TABLET ORAL at 21:07

## 2022-11-02 RX ADMIN — Medication 1000 MILLIGRAM(S): at 13:42

## 2022-11-02 RX ADMIN — ENOXAPARIN SODIUM 40 MILLIGRAM(S): 100 INJECTION SUBCUTANEOUS at 21:07

## 2022-11-02 RX ADMIN — TRAMADOL HYDROCHLORIDE 50 MILLIGRAM(S): 50 TABLET ORAL at 07:41

## 2022-11-02 RX ADMIN — ATORVASTATIN CALCIUM 40 MILLIGRAM(S): 80 TABLET, FILM COATED ORAL at 21:07

## 2022-11-02 RX ADMIN — Medication 50 MILLIGRAM(S): at 07:00

## 2022-11-02 RX ADMIN — BRIMONIDINE TARTRATE 1 DROP(S): 2 SOLUTION/ DROPS OPHTHALMIC at 13:42

## 2022-11-02 RX ADMIN — DORZOLAMIDE HYDROCHLORIDE 1 DROP(S): 20 SOLUTION/ DROPS OPHTHALMIC at 07:01

## 2022-11-02 RX ADMIN — SIMETHICONE 80 MILLIGRAM(S): 80 TABLET, CHEWABLE ORAL at 20:35

## 2022-11-02 RX ADMIN — Medication 50 MILLIGRAM(S): at 20:18

## 2022-11-02 RX ADMIN — FAMOTIDINE 20 MILLIGRAM(S): 10 INJECTION INTRAVENOUS at 20:32

## 2022-11-02 RX ADMIN — Medication 1000 MILLIGRAM(S): at 07:00

## 2022-11-02 RX ADMIN — TRAMADOL HYDROCHLORIDE 50 MILLIGRAM(S): 50 TABLET ORAL at 21:07

## 2022-11-02 RX ADMIN — Medication 50 MILLIGRAM(S): at 13:42

## 2022-11-02 RX ADMIN — DORZOLAMIDE HYDROCHLORIDE 1 DROP(S): 20 SOLUTION/ DROPS OPHTHALMIC at 13:42

## 2022-11-02 RX ADMIN — Medication 4: at 21:07

## 2022-11-02 RX ADMIN — Medication 50 MILLIGRAM(S): at 00:41

## 2022-11-02 RX ADMIN — TRAMADOL HYDROCHLORIDE 50 MILLIGRAM(S): 50 TABLET ORAL at 22:00

## 2022-11-02 NOTE — CONSULT NOTE ADULT - ASSESSMENT
per Neurosurgery    62 y o F with pmh polio with chronic b/l lower extremity weakness, DM, asthma, and HLD now s/p L5-S1 orellana laminectomy, L5-S1 TLIF, L4-S2/AI fusion (10/26).      PLAN:    Neuro:  - neuro checks/vital signs q4hrs   - pain control, ERAS  - postop xrays complete  - decadron 4q6 x 3 days  - monitor HMV x1  - ANA LUISA x1 removed 10/30    Cardiac:  - SBP goal 100-160  - Hold home BP meds: HCTZ, amlodipine, losartan    Pulmonary:  - on RA, IS    GI:  - carb consistent diet   - bowel regimen  - manually disimpacted 10/29    Renal/:  - IVL, voiding  - cont home oxybutynin 5mg BID    Heme:  - SCDs, SQL for DVT prophylaxis  - s/p 1 U PRBC 10/27, 10/28, H/H stable     Endo:  - ISS, A1C 6.3  - lantus 12u, lispro 10u  - cont home lipitor 40mg     ID:  - afebrile, no leukocytosis  
62y F with pmh polio with chronic b/l lower extremity weakness, DM, asthma, and HLD now s/p L5-S1 orellana laminectomy, L5-S1 TLIF, L4-S2/AI fusion (10/26)

## 2022-11-02 NOTE — PROGRESS NOTE ADULT - SUBJECTIVE AND OBJECTIVE BOX
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 She has a longstanding history of right lower extremity weakness due to childhood illness which is suspicious for polio. She recently has had severe back and leg pain as well as many recent falls. 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 Pt is here for an elective surgery by Dr Manzanares.       (28 Oct 2022 09:05)    OVERNIGHT EVENTS: insulin held as per overnight hospitalist Dr. Aguilera. Will monitor sliding coverage x24hrs to determine lispro/lantus needs.     HOSPITAL COURSE:  10/26: POD 0 L4-Pelvis fusion. HMV x 2, ANA LUISA x 1  10/27: POD 1 lumbar fusion. JAMAL o/n. Given 1 U prbc for Hgb 6.4. Hemoglobin 8.5 post transfusion. Jason d/c'ed this morning, passed TOV. Pending standing x-rays   10/28: POD 2 L4-S1 fusion. Drains remain, Pending XR. Recieved 1 U prbc for Hgb 7.7. Responded well Hgb increased to 8.8. Hemodynamically stable.   10/29: POD 3 L4-S1 fusion. HMV/ANA LUISA in place. Pending AR. elevated FS, 10u regular insulin.  10/30: POD 4 L4-S1 fusion. HMV/ANA LUISA in place. Dulcolax suppository ordered for am, manually disimpacted in by  yesterday.   10/31: POD5. JAMAL overnight. SBP 85 this morning 500c bolus given  11/1: POD6. given additional 50mg tramadol for pain, pending post-op lumbar spine CT, had BM yesterday, HMV output 100/120. Pending AR  11/2: POD7. insulin held as per overnight hospitalist Dr. Aguilera. Will monitor sliding coverage x24hrs to determine lispro/lantus needs.         Vital Signs Last 24 Hrs  T(C): 36.7 (01 Nov 2022 23:30), Max: 37.1 (01 Nov 2022 13:09)  T(F): 98 (01 Nov 2022 23:30), Max: 98.7 (01 Nov 2022 13:09)  HR: 61 (01 Nov 2022 23:30) (60 - 82)  BP: 101/66 (01 Nov 2022 23:30) (84/53 - 105/68)  BP(mean): --  RR: 18 (01 Nov 2022 23:30) (17 - 18)  SpO2: 95% (01 Nov 2022 23:30) (94% - 97%)    Parameters below as of 01 Nov 2022 23:30  Patient On (Oxygen Delivery Method): room air        I&O's Summary    31 Oct 2022 07:01  -  01 Nov 2022 07:00  --------------------------------------------------------  IN: 0 mL / OUT: 1270 mL / NET: -1270 mL    01 Nov 2022 07:01  -  02 Nov 2022 00:49  --------------------------------------------------------  IN: 0 mL / OUT: 20 mL / NET: -20 mL        PHYSICAL EXAM:  General: NAD, pt is comfortably sitting up in bed, on room air  HEENT: EOMI b/l, face symmetric, tongue midline, neck FROM  Cardiovascular: Regular rate and rhythm  Respiratory: nonlabored breathing, normal chest rise  GI: abdomen soft, nontender, nondistended  Neuro: CN II-XII grossly intact, PERRL 3mm briskly reactive   A&Ox3, No aphasia, speech clear, no dysmetria, no pronator drift. Follows commands.  MCCLENDON x4 spontaneously, UE 5/5, b/l LE 2/5 HF, 3/5 KF/KE, DF/PF/EHL 5/5.  Extremities: distal pulses 2+ x4    TUBES/LINES:  [] Jason  [x] Wound Drains: HMVx1  [] Others      DIET:  [] NPO  [x] Mechanical  [] Tube feeds    LABS:                        10.5   6.73  )-----------( 286      ( 01 Nov 2022 05:30 )             32.3     11-01    138  |  103  |  17  ----------------------------<  110<H>  4.0   |  27  |  0.22<L>    Ca    8.4      01 Nov 2022 05:30  Phos  4.5     11-01  Mg     1.9     11-01              CAPILLARY BLOOD GLUCOSE      POCT Blood Glucose.: 71 mg/dL (01 Nov 2022 22:19)  POCT Blood Glucose.: 184 mg/dL (01 Nov 2022 17:02)  POCT Blood Glucose.: 78 mg/dL (01 Nov 2022 12:27)  POCT Blood Glucose.: 105 mg/dL (01 Nov 2022 07:45)      Drug Levels: [] N/A    CSF Analysis: [] N/A      Allergies    No Known Allergies    Intolerances      MEDICATIONS:  Antibiotics:    Neuro:  acetaminophen     Tablet .. 1000 milliGRAM(s) Oral every 8 hours  methocarbamol 500 milliGRAM(s) Oral every 8 hours PRN  ondansetron   Disintegrating Tablet 4 milliGRAM(s) Oral every 6 hours  pregabalin 50 milliGRAM(s) Oral three times a day  traMADol 25 milliGRAM(s) Oral every 6 hours PRN  traMADol 50 milliGRAM(s) Oral every 6 hours PRN    Anticoagulation:  enoxaparin Injectable 40 milliGRAM(s) SubCutaneous <User Schedule>    OTHER:  atorvastatin 40 milliGRAM(s) Oral at bedtime  bisacodyl 5 milliGRAM(s) Oral every 12 hours PRN  brimonidine 0.2% Ophthalmic Solution 1 Drop(s) Both EYES every 8 hours  dorzolamide 2% Ophthalmic Solution 1 Drop(s) Both EYES every 8 hours  famotidine    Tablet 20 milliGRAM(s) Oral every 12 hours PRN  insulin lispro (ADMELOG) corrective regimen sliding scale   SubCutaneous Before meals and at bedtime  latanoprost 0.005% Ophthalmic Solution 1 Drop(s) Both EYES at bedtime  oxybutynin 5 milliGRAM(s) Oral two times a day  senna 2 Tablet(s) Oral at bedtime  simethicone 80 milliGRAM(s) Chew every 8 hours PRN    IVF:    CULTURES:    RADIOLOGY & ADDITIONAL TESTS:  < from: Xray Abdomen 2 Views (10.30.22 @ 14:09) >  IMPRESSION:    Nonspecific bowel gas pattern with no bowel distention or bowel   obstruction. Spinal/pelvic hardware. Overlying drain. Lung bases clear.   Degenerative changes lumbar spine.    < from: CT 3D Reconstruct w/o Workstation (08.31.22 @ 10:16) >  IMPRESSION:  1.  Grade 1 anterolisthesis at L4-L5 due to bilateral pars   interarticularis defects at L5. Compensatory grade 1 retrolisthesis at   L4-L5.  2.  Facet arthrosis most severe at L4-L5.          ASSESSMENT:  62y F with pmh polio with chronic b/l lower extremity weakness, DM, asthma, and HLD now s/p L5-S1 orellana laminectomy, L5-S1 TLIF, L4-S2/AI fusion (10/26).      PLAN:    Neuro:  - neuro checks/vital signs q4hrs   - pain control, ERAS  - postop xrays complete  - decadron 4q6 x 3 days  - monitor HMV x1  - ANA LUISA x1 removed 10/30    Cardiac:  - SBP goal 100-160  - Hold home BP meds: HCTZ, amlodipine, losartan    Pulmonary:  - on RA, IS    GI:  - carb consistent diet   - bowel regimen  - manually disimpacted 10/29    Renal/:  - IVL, voiding  - cont home oxybutynin 5mg BID    Heme:  - SCDs, SQL for DVT prophylaxis  - s/p 1 U PRBC 10/27, 10/28, H/H stable     Endo:  - ISS, A1C 6.3  - lantus 12u, lispro 10u  - cont home lipitor 40mg     ID:  - afebrile, no leukocytosis    Disposition: SDU status, full code, PT/OT recc AR     Assessment and Plan d/w Dr. Manzanares

## 2022-11-02 NOTE — CONSULT NOTE ADULT - SUBJECTIVE AND OBJECTIVE BOX
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 She has a longstanding history of right lower extremity weakness due to childhood illness which is suspicious for polio. She recently has had severe back and leg pain as well as many recent falls. 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 Pt is here for an elective surgery by Dr Manzanares.       (28 Oct 2022 09:05)      PAST MEDICAL & SURGICAL HISTORY:  Poliomyelitis      DM (diabetes mellitus)      Asthma      HTN (hypertension)      Hypercholesterolemia      Colonic polyp      Osteoarthritis      Trigger finger      Patella fracture  left      COVID-19 virus infection      History of colitis      History of urinary incontinence      Spondylolisthesis, lumbar region      Lumbar spondylosis  L5      History of shoulder surgery          MEDICATIONS  (STANDING):  acetaminophen     Tablet .. 1000 milliGRAM(s) Oral every 8 hours  atorvastatin 40 milliGRAM(s) Oral at bedtime  brimonidine 0.2% Ophthalmic Solution 1 Drop(s) Both EYES every 8 hours  dorzolamide 2% Ophthalmic Solution 1 Drop(s) Both EYES every 8 hours  enoxaparin Injectable 40 milliGRAM(s) SubCutaneous <User Schedule>  insulin lispro (ADMELOG) corrective regimen sliding scale   SubCutaneous Before meals and at bedtime  latanoprost 0.005% Ophthalmic Solution 1 Drop(s) Both EYES at bedtime  ondansetron   Disintegrating Tablet 4 milliGRAM(s) Oral every 6 hours  oxybutynin 5 milliGRAM(s) Oral two times a day  pregabalin 50 milliGRAM(s) Oral three times a day  senna 2 Tablet(s) Oral at bedtime    MEDICATIONS  (PRN):  bisacodyl 5 milliGRAM(s) Oral every 12 hours PRN Constipation  famotidine    Tablet 20 milliGRAM(s) Oral every 12 hours PRN Dyspepsia  methocarbamol 500 milliGRAM(s) Oral every 8 hours PRN Muscle Spasm  simethicone 80 milliGRAM(s) Chew every 8 hours PRN Gas  traMADol 25 milliGRAM(s) Oral every 6 hours PRN Moderate Pain (4 - 6)  traMADol 50 milliGRAM(s) Oral every 6 hours PRN Severe Pain (7 - 10)        Social History:                   -  Home Living Status :  lives with her  and cousin in a private house with no steps         -  Prior Home Care Services :  Wednesdays and Thursdays x 8 hrs / day      Baseline Functional Level Prior to Admission :             - ADL's/ IADL's :  independent          - ambulatory status PTA :   walks with a cane/RW,  uses motorized scooter in the community       FAMILY HISTORY:    CBC Full  -  ( 02 Nov 2022 07:04 )  WBC Count : 7.29 K/uL  RBC Count : 3.67 M/uL  Hemoglobin : 10.8 g/dL  Hematocrit : 33.2 %  Platelet Count - Automated : 319 K/uL  Mean Cell Volume : 90.5 fl  Mean Cell Hemoglobin : 29.4 pg  Mean Cell Hemoglobin Concentration : 32.5 gm/dL  Auto Neutrophil # : x  Auto Lymphocyte # : x  Auto Monocyte # : x  Auto Eosinophil # : x  Auto Basophil # : x  Auto Neutrophil % : x  Auto Lymphocyte % : x  Auto Monocyte % : x  Auto Eosinophil % : x  Auto Basophil % : x      11-02    141  |  107  |  12  ----------------------------<  114<H>  5.0   |  26  |  0.26<L>    Ca    8.7      02 Nov 2022 07:04  Phos  4.3     11-02  Mg     2.1     11-02    TPro  x   /  Alb  x   /  TBili  0.3  /  DBili  x   /  AST  x   /  ALT  x   /  AlkPhos  x   11-02            Radiology :                     Vital Signs Last 24 Hrs  T(C): 36.4 (02 Nov 2022 04:10), Max: 37.1 (01 Nov 2022 13:09)  T(F): 97.6 (02 Nov 2022 04:10), Max: 98.7 (01 Nov 2022 13:09)  HR: 65 (02 Nov 2022 04:10) (61 - 82)  BP: 100/66 (02 Nov 2022 04:10) (84/53 - 105/68)  BP(mean): --  RR: 16 (02 Nov 2022 04:10) (16 - 18)  SpO2: 98% (02 Nov 2022 04:10) (94% - 98%)    Parameters below as of 02 Nov 2022 04:10  Patient On (Oxygen Delivery Method): room air            REVIEW OF SYSTEMS:  per HPI         Physical Exam:  short statured Bel[place]ian speaking woman lying comfortably in semi Machado's position , awake , alert , no acute complaints     Head : normocephalic , atraumatic    Eyes : PERRLA , EOMI , no nystagmus , sclera anicteric    ENT : nasal discharge , uvula midline , no oropharyngeal erythema / exudate    Neck : supple , negative JVD , negative carotid bruits , no thyromegaly    Chest : CTA bilaterally , neg wheeze / rhonchi / rales / crackles / egophany    Cardiovascular: regular rate and rhythm , neg murmurs / rubs / gallops    Abdomen : soft , non distended , non tender to palpation in all 4 quadrants , normal bowel sounds    Extremities : WWP , neg cyanosis /clubbing / edema ,  R>L LE atrophy , LUE scar palmar wrist : childhood injury     Neurologic Exam:    Alert and oriented x 3     Motor Exam:          Right UE:            4/5                                 Left UE:                    3+/5 :  (chronic from childhood R UE injury  3+ : wrist extensors/ flexors   5/5 : biceps                  5/5 : triceps  5/5 : deltoid          Right LE:    flaccid          Left LE:                  5/5 : dorsiflexors   5/5 : plantar flexors  3/5 : quadriceps  3+/5 : hamstrings  3+/5 : hip flexors        Sensation:         intact to light touch x 4 extremities                                               DTR :                     biceps/brachioradialis : 0 equal                                              patella/ankle : 0 equal                                                                               neg Babinski    neg clonus     P.T/O.T. functional assessment :     Range of Motion Exam:   · Range of Motion Examination	no ROM deficits were identified; except for spinal ROM due to spinal precautions    Manual Muscle Testing:   · Manual Muscle Testing Results	LLE 3-/5 grossly; LLE >3+/5 upon functional assessment against gravity.    Muscle Tone Assessment:   · Muscle Tone Assessment	hypotonic; RLE    Bed Mobility: Rolling/Turning:     · Level of Stewart	maximum assist (25% patients effort)  · Physical Assist/Nonphysical Assist	2 person assist    Bed Mobility: Scooting/Bridging:     · Level of Stewart	maximum assist (25% patients effort)  · Physical Assist/Nonphysical Assist	2 person assist    Bed Mobility: Sit to Supine:     · Level of Stewart	maximum assist (25% patients effort)  · Physical Assist/Nonphysical Assist	2 person assist    Bed Mobility: Supine to Sit:     · Level of Stewart	maximum assist (25% patients effort)  · Physical Assist/Nonphysical Assist	2 person assist    Bed Mobility Analysis:     · Impairments Contributing to Impaired Bed Mobility	impaired balance; pain; decreased strength; impaired motor control    Transfer: Sit to Stand:     · Level of Stewart	maximum assist (25% patients effort)  · Physical Assist/Nonphysical Assist	2 person assist  · Weight-Bearing Restrictions	full weight-bearing  · Assistive Device	b/l hand held assist    Transfer: Stand to Sit:     · Level of Stewart	moderate assist (50% patients effort)  · Physical Assist/Nonphysical Assist	2 person assist  · Weight-Bearing Restrictions	full weight-bearing  · Assistive Device	b/l hand held assist    Sit/Stand Transfer Safety Analysis:     · Impairments Contributing to Impaired Transfers	impaired balance; pain; abnormal muscle tone; impaired motor control; decreased strength    Gait Skills:     · Level of Stewart	maximum assist (25% patients effort)  · Physical Assist/Nonphysical Assist	2 person assist  · Weight-Bearing Restrictions	full weight-bearing  · Assistive Device	b/l hand held assist  · Gait Distance	bed <-> commode    Gait Analysis:     · Gait Pattern Used	2-point gait  · Gait Deviations Noted	increased time in double stance; decreased velocity of limb motion; decreased step length; decreased stride length; decreased swing-to-stance ratio  · Impairments Contributing to Gait Deviations	impaired balance; pain; impaired motor control; abnormal muscle tone; decreased strength    Stair Negotiation:     · Level of Stewart	not assessed    Balance Skills Assessment:     · Sitting Balance: Static	fair balance  · Sitting Balance: Dynamic	fair minus  · Sit-to-Stand Balance	poor balance  · Standing Balance: Static	poor balance  · Standing Balance: Dynamic	poor balance  · Identified Impairments Contributing to Balance Disturbance	pain; decreased strength    Sensory Examination:   Sensory Examination:    Light Touch Sensation:   · Left LE	within normal limits  · Right LE	within normal limits      Clinical Impressions:   · Criteria for Skilled Therapeutic Interventions	impairments found; functional limitations in following categories; risk reduction/prevention; rehab potential; therapy frequency; predicted duration of therapy intervention; anticipated discharge recommendation  · Impairments Found (describe specific impairments)	posture; gait, locomotion, and balance; muscle strength  · Functional Limitations in Following Categories (describe specific limitations)	self-care; home management; community/leisure  · Risk Reduction/Prevention (Describe Specific Areas of risk reduction/prevention)	risk factors  · Risk Areas	fall  · Rehab Potential	good, to achieve stated therapy goals  · Therapy Frequency	daily    Lower Body Dressing Training:     · Level of Stewart	maximum assist (25% patients effort)  · Physical Assist/Nonphysical Assist	1 person assist    Toilet Hygiene Training:     · Level of Stewart	maximum assist (25% patients effort); while standing  · Physical Assist/Nonphysical Assist	1 person assist; verbal cues        PM&R Impression :  s/p L5-S1 orellana laminectomy, L5-S1 TLIF, L4-S2/AI fusion (10/26).              Disposition plan recommendation : Patient is an excellent candidate for acute rehab placement .    1) Patient has an acute neurologic diagnosis .    2) Patient is extremely motivated in rehab and is actively participating in PT and OT and requires ongoing multiple therapy disciplines .    3) Patient is not at baseline functional level .    4) Patient requires an intensive inpatient acute rehabilitation therapy and can tolerate > 3 hrs of combined PT and OT per day and at least 5 days per week with a reasonable expectation that the patient will make measurable functional improvement that only an acute rehab program can provide .    5) Patient requires Physiatry supervision specializing in acute inpatient rehab care .     6) Has strong family support for post acute rehab stay .            
Patient is a 62y old  Female who presents with a chief complaint of     HPI: 62y F with pmh polio with chronic b/l lower extremity weakness, DM, asthma, and HLD now s/p L5-S1 orellana laminectomy, L5-S1 TLIF, who presented for a L4-S2/AI fusion (10/26).    Patient seen and examined at bedside.  Reports that at rest she is comfortable but she has back pain with any movement that is a 10/10.  Pain medication reduces it to 8/10.  She denies fevers, chills, CP, SOB, N/V, abdominal pain.  Is urinating but has not had BM this AM      REVIEW OF SYSTEMS: see HPI    PAST MEDICAL & SURGICAL HISTORY:  Poliomyelitis      DM (diabetes mellitus)      Asthma      HTN (hypertension)      Hypercholesterolemia      Colonic polyp      Osteoarthritis      Trigger finger      Patella fracture  left      COVID-19 virus infection      History of colitis      History of urinary incontinence      Spondylolisthesis, lumbar region      Lumbar spondylosis  L5      History of shoulder surgery          FAMILY HISTORY:  No significant family cardiac history    SOCIAL HISTORY:  Tobacco use: Never smoker  EtOH use: None  Recreational drug use: None    MEDICATIONS:  MEDICATIONS  (STANDING):  acetaminophen     Tablet .. 1000 milliGRAM(s) Oral every 8 hours  brimonidine 0.2% Ophthalmic Solution 1 Drop(s) Both EYES every 8 hours  dexAMETHasone  Injectable 4 milliGRAM(s) IV Push every 6 hours  dextrose 5%. 1000 milliLiter(s) (50 mL/Hr) IV Continuous <Continuous>  dextrose 50% Injectable 25 Gram(s) IV Push once  dorzolamide 2% Ophthalmic Solution 1 Drop(s) Both EYES every 8 hours  insulin lispro (ADMELOG) corrective regimen sliding scale   SubCutaneous Before meals and at bedtime  latanoprost 0.005% Ophthalmic Solution 1 Drop(s) Both EYES at bedtime  methocarbamol 500 milliGRAM(s) Oral every 8 hours  ondansetron   Disintegrating Tablet 4 milliGRAM(s) Oral every 6 hours  oxybutynin 5 milliGRAM(s) Oral two times a day  potassium chloride    Tablet ER 40 milliEquivalent(s) Oral every 4 hours  pregabalin 50 milliGRAM(s) Oral three times a day  senna 2 Tablet(s) Oral at bedtime    MEDICATIONS  (PRN):  bisacodyl 5 milliGRAM(s) Oral every 12 hours PRN Constipation  dextrose Oral Gel 15 Gram(s) Oral once PRN Blood Glucose LESS THAN 70 milliGRAM(s)/deciliter  famotidine    Tablet 20 milliGRAM(s) Oral every 12 hours PRN Dyspepsia  oxyCODONE    IR 5 milliGRAM(s) Oral every 4 hours PRN Severe Pain (7 - 10)      ALLERGIES:  Allergies    No Known Allergies    Intolerances        VITAL SIGNS:  Vital Signs Last 24 Hrs  T(C): 36.9 (27 Oct 2022 14:30), Max: 37.3 (27 Oct 2022 05:28)  T(F): 98.4 (27 Oct 2022 14:30), Max: 99.1 (27 Oct 2022 05:28)  HR: 81 (27 Oct 2022 18:13) (63 - 93)  BP: 100/64 (27 Oct 2022 18:13) (80/51 - 126/74)  BP(mean): 72 (26 Oct 2022 20:28) (62 - 82)  RR: 18 (27 Oct 2022 18:13) (16 - 19)  SpO2: 95% (27 Oct 2022 18:13) (95% - 100%)    Parameters below as of 27 Oct 2022 18:13  Patient On (Oxygen Delivery Method): nasal cannula  O2 Flow (L/min): 2      10-26-22 @ 07:01  -  10-27-22 @ 07:00  --------------------------------------------------------  IN:    sodium chloride 0.9%: 375 mL    Sodium Chloride 0.9% Bolus: 500 mL  Total IN: 875 mL    OUT:    Accordian (mL): 265 mL    Accordian (mL): 80 mL    Bulb (mL): 45 mL    Indwelling Catheter - Urethral (mL): 1520 mL  Total OUT: 1910 mL    Total NET: -1035 mL      10-27-22 @ 07:01  -  10-27-22 @ 18:21  --------------------------------------------------------  IN:  Total IN: 0 mL    OUT:    Accordian (mL): 120 mL    Accordian (mL): 40 mL    Bulb (mL): 50 mL    Voided (mL): 650 mL  Total OUT: 860 mL    Total NET: -860 mL          PHYSICAL EXAM:  Constitutional: Elderly female, resting comfortably in bed; NAD  Head: NC/AT  Eyes: PERRL, EOMI, anicteric sclera  ENT:  MMM  Neck: supple; no JVD  Respiratory: CTA B/L; no W/R/R, no retractions  Cardiac: +S1/S2; RRR; no M/R/G  Gastrointestinal: abdomen soft, NT/ND; no rebound or guarding; +BSx4  Extremities: WWP, no clubbing or cyanosis; no peripheral edema  Musculoskeletal: NROM x4; no joint swelling, tenderness or erythema  Vascular: 2+ radial, femoral, DP/PT pulses B/L  Dermatologic: skin warm, dry and intact; no rashes, wounds, or scars  Neurologic: AAOx3; b/l UEs 5/5 strength, 3/5 strength b/l LEs, CNII-XII grossly intact  Psychiatric: affect and characteristics of appearance, verbalizations, behaviors are appropriate    LABS:                        8.5    7.92  )-----------( 176      ( 27 Oct 2022 08:53 )             25.0     10-27    145  |  112<H>  |  7   ----------------------------<  171<H>  3.3<L>   |  25  |  0.18<L>    Ca    7.7<L>      27 Oct 2022 07:55  Phos  4.0     10-27  Mg     1.6     10-27    TPro  x   /  Alb  x   /  TBili  0.2  /  DBili  x   /  AST  x   /  ALT  x   /  AlkPhos  x   10-27    PT/INR - ( 27 Oct 2022 07:55 )   PT: 12.2 sec;   INR: 1.02                  CAPILLARY BLOOD GLUCOSE      POCT Blood Glucose.: 242 mg/dL (27 Oct 2022 17:24)  POCT Blood Glucose.: 266 mg/dL (27 Oct 2022 11:52)  POCT Blood Glucose.: 177 mg/dL (27 Oct 2022 07:44)          RADIOLOGY & ADDITIONAL TESTS: Reviewed.

## 2022-11-02 NOTE — PROGRESS NOTE ADULT - SUBJECTIVE AND OBJECTIVE BOX
SUBJECTIVE:  Patient seen and examined at bedside.  Pain stable.  Reports continuing to feel week and lightheaded with activity.  Drank 4 small water bottles yesterday, 2 so far this AM.  Appetite has been adequate and eating the same.  Last BM yesterday    ROS:  Denies fevers, chills, headache, cough, SOB, chest pain, Abdominal pain, N/V, dysuria or new rash.  All other ROS negative except as above    Vital Signs Last 12 Hrs  T(F): 97.8 (11-02-22 @ 08:46), Max: 97.8 (11-02-22 @ 08:46)  HR: 77 (11-02-22 @ 08:46) (65 - 77)  BP: 97/66 (11-02-22 @ 08:46) (97/66 - 100/66)  BP(mean): --  RR: 17 (11-02-22 @ 08:46) (16 - 17)  SpO2: 95% (11-02-22 @ 08:46) (95% - 98%)  I&O's Summary    01 Nov 2022 07:01  -  02 Nov 2022 07:00  --------------------------------------------------------  IN: 0 mL / OUT: 440 mL / NET: -440 mL        PHYSICAL EXAM:  Constitutional: NAD, comfortable lying in bed.  HEENT: PERRLA, EOMI, no conjunctival pallor or scleral icterus, MMM  Neck: Supple, no JVD  Respiratory: CTA B/L. No w/r/r.   Cardiovascular: RRR, normal S1 and S2, no m/r/g.   Gastrointestinal: +BS, soft NTND, no guarding or rebound tenderness, no palpable masses   Extremities: wwp; no cyanosis, clubbing or edema.   Vascular: Pulses equal and strong throughout.   Neurological: AAOx3, strength 5/5 b/l UEs, 3/5 b/l LEs and sensation intact throughout.   Skin: No gross skin abnormalities or rashes        LABS:                        10.8   7.29  )-----------( 319      ( 02 Nov 2022 07:04 )             33.2     11-02    141  |  107  |  12  ----------------------------<  114<H>  5.0   |  26  |  0.26<L>    Ca    8.7      02 Nov 2022 07:04  Phos  4.3     11-02  Mg     2.1     11-02    TPro  x   /  Alb  x   /  TBili  0.3  /  DBili  x   /  AST  x   /  ALT  x   /  AlkPhos  x   11-02    PT/INR - ( 02 Nov 2022 07:04 )   PT: 11.1 sec;   INR: 0.93                  RADIOLOGY & ADDITIONAL TESTS:  Pending repeat CTs    MEDICATIONS  (STANDING):  acetaminophen     Tablet .. 1000 milliGRAM(s) Oral every 8 hours  atorvastatin 40 milliGRAM(s) Oral at bedtime  brimonidine 0.2% Ophthalmic Solution 1 Drop(s) Both EYES every 8 hours  dorzolamide 2% Ophthalmic Solution 1 Drop(s) Both EYES every 8 hours  enoxaparin Injectable 40 milliGRAM(s) SubCutaneous <User Schedule>  insulin lispro (ADMELOG) corrective regimen sliding scale   SubCutaneous Before meals and at bedtime  latanoprost 0.005% Ophthalmic Solution 1 Drop(s) Both EYES at bedtime  ondansetron   Disintegrating Tablet 4 milliGRAM(s) Oral every 6 hours  oxybutynin 5 milliGRAM(s) Oral two times a day  pregabalin 50 milliGRAM(s) Oral three times a day  senna 2 Tablet(s) Oral at bedtime    MEDICATIONS  (PRN):  bisacodyl 5 milliGRAM(s) Oral every 12 hours PRN Constipation  famotidine    Tablet 20 milliGRAM(s) Oral every 12 hours PRN Dyspepsia  methocarbamol 500 milliGRAM(s) Oral every 8 hours PRN Muscle Spasm  simethicone 80 milliGRAM(s) Chew every 8 hours PRN Gas  traMADol 25 milliGRAM(s) Oral every 6 hours PRN Moderate Pain (4 - 6)  traMADol 50 milliGRAM(s) Oral every 6 hours PRN Severe Pain (7 - 10)

## 2022-11-03 LAB
ANION GAP SERPL CALC-SCNC: 10 MMOL/L — SIGNIFICANT CHANGE UP (ref 5–17)
BUN SERPL-MCNC: 11 MG/DL — SIGNIFICANT CHANGE UP (ref 7–23)
CALCIUM SERPL-MCNC: 9.1 MG/DL — SIGNIFICANT CHANGE UP (ref 8.4–10.5)
CHLORIDE SERPL-SCNC: 104 MMOL/L — SIGNIFICANT CHANGE UP (ref 96–108)
CO2 SERPL-SCNC: 27 MMOL/L — SIGNIFICANT CHANGE UP (ref 22–31)
CREAT SERPL-MCNC: 0.32 MG/DL — LOW (ref 0.5–1.3)
EGFR: 119 ML/MIN/1.73M2 — SIGNIFICANT CHANGE UP
GLUCOSE BLDC GLUCOMTR-MCNC: 128 MG/DL — HIGH (ref 70–99)
GLUCOSE BLDC GLUCOMTR-MCNC: 133 MG/DL — HIGH (ref 70–99)
GLUCOSE BLDC GLUCOMTR-MCNC: 182 MG/DL — HIGH (ref 70–99)
GLUCOSE BLDC GLUCOMTR-MCNC: 209 MG/DL — HIGH (ref 70–99)
GLUCOSE SERPL-MCNC: 169 MG/DL — HIGH (ref 70–99)
HCT VFR BLD CALC: 32.2 % — LOW (ref 34.5–45)
HGB BLD-MCNC: 10.4 G/DL — LOW (ref 11.5–15.5)
MAGNESIUM SERPL-MCNC: 1.8 MG/DL — SIGNIFICANT CHANGE UP (ref 1.6–2.6)
MCHC RBC-ENTMCNC: 29.4 PG — SIGNIFICANT CHANGE UP (ref 27–34)
MCHC RBC-ENTMCNC: 32.3 GM/DL — SIGNIFICANT CHANGE UP (ref 32–36)
MCV RBC AUTO: 91 FL — SIGNIFICANT CHANGE UP (ref 80–100)
NRBC # BLD: 0 /100 WBCS — SIGNIFICANT CHANGE UP (ref 0–0)
PHOSPHATE SERPL-MCNC: 5 MG/DL — HIGH (ref 2.5–4.5)
PLATELET # BLD AUTO: 321 K/UL — SIGNIFICANT CHANGE UP (ref 150–400)
POTASSIUM SERPL-MCNC: 4.2 MMOL/L — SIGNIFICANT CHANGE UP (ref 3.5–5.3)
POTASSIUM SERPL-SCNC: 4.2 MMOL/L — SIGNIFICANT CHANGE UP (ref 3.5–5.3)
RBC # BLD: 3.54 M/UL — LOW (ref 3.8–5.2)
RBC # FLD: 14.6 % — HIGH (ref 10.3–14.5)
SODIUM SERPL-SCNC: 141 MMOL/L — SIGNIFICANT CHANGE UP (ref 135–145)
WBC # BLD: 8.26 K/UL — SIGNIFICANT CHANGE UP (ref 3.8–10.5)
WBC # FLD AUTO: 8.26 K/UL — SIGNIFICANT CHANGE UP (ref 3.8–10.5)

## 2022-11-03 PROCEDURE — 99232 SBSQ HOSP IP/OBS MODERATE 35: CPT

## 2022-11-03 PROCEDURE — 74019 RADEX ABDOMEN 2 VIEWS: CPT | Mod: 26

## 2022-11-03 RX ORDER — POLYETHYLENE GLYCOL 3350 17 G/17G
17 POWDER, FOR SOLUTION ORAL ONCE
Refills: 0 | Status: COMPLETED | OUTPATIENT
Start: 2022-11-03 | End: 2022-11-03

## 2022-11-03 RX ORDER — SODIUM CHLORIDE 9 MG/ML
1000 INJECTION INTRAMUSCULAR; INTRAVENOUS; SUBCUTANEOUS
Refills: 0 | Status: DISCONTINUED | OUTPATIENT
Start: 2022-11-03 | End: 2022-11-04

## 2022-11-03 RX ADMIN — Medication 50 MILLIGRAM(S): at 21:28

## 2022-11-03 RX ADMIN — BRIMONIDINE TARTRATE 1 DROP(S): 2 SOLUTION/ DROPS OPHTHALMIC at 05:48

## 2022-11-03 RX ADMIN — ONDANSETRON 4 MILLIGRAM(S): 8 TABLET, FILM COATED ORAL at 10:59

## 2022-11-03 RX ADMIN — Medication 5 MILLIGRAM(S): at 10:58

## 2022-11-03 RX ADMIN — SODIUM CHLORIDE 500 MILLILITER(S): 9 INJECTION INTRAMUSCULAR; INTRAVENOUS; SUBCUTANEOUS at 16:51

## 2022-11-03 RX ADMIN — TRAMADOL HYDROCHLORIDE 50 MILLIGRAM(S): 50 TABLET ORAL at 13:58

## 2022-11-03 RX ADMIN — Medication 1000 MILLIGRAM(S): at 22:29

## 2022-11-03 RX ADMIN — DORZOLAMIDE HYDROCHLORIDE 1 DROP(S): 20 SOLUTION/ DROPS OPHTHALMIC at 15:09

## 2022-11-03 RX ADMIN — DORZOLAMIDE HYDROCHLORIDE 1 DROP(S): 20 SOLUTION/ DROPS OPHTHALMIC at 21:29

## 2022-11-03 RX ADMIN — ENOXAPARIN SODIUM 40 MILLIGRAM(S): 100 INJECTION SUBCUTANEOUS at 21:28

## 2022-11-03 RX ADMIN — Medication 1000 MILLIGRAM(S): at 04:09

## 2022-11-03 RX ADMIN — SENNA PLUS 2 TABLET(S): 8.6 TABLET ORAL at 21:28

## 2022-11-03 RX ADMIN — Medication 5 MILLIGRAM(S): at 17:59

## 2022-11-03 RX ADMIN — BRIMONIDINE TARTRATE 1 DROP(S): 2 SOLUTION/ DROPS OPHTHALMIC at 15:08

## 2022-11-03 RX ADMIN — TRAMADOL HYDROCHLORIDE 50 MILLIGRAM(S): 50 TABLET ORAL at 21:04

## 2022-11-03 RX ADMIN — Medication 1000 MILLIGRAM(S): at 16:45

## 2022-11-03 RX ADMIN — DORZOLAMIDE HYDROCHLORIDE 1 DROP(S): 20 SOLUTION/ DROPS OPHTHALMIC at 05:48

## 2022-11-03 RX ADMIN — TRAMADOL HYDROCHLORIDE 50 MILLIGRAM(S): 50 TABLET ORAL at 20:04

## 2022-11-03 RX ADMIN — TRAMADOL HYDROCHLORIDE 50 MILLIGRAM(S): 50 TABLET ORAL at 14:00

## 2022-11-03 RX ADMIN — Medication 50 MILLIGRAM(S): at 15:06

## 2022-11-03 RX ADMIN — ONDANSETRON 4 MILLIGRAM(S): 8 TABLET, FILM COATED ORAL at 17:54

## 2022-11-03 RX ADMIN — TRAMADOL HYDROCHLORIDE 50 MILLIGRAM(S): 50 TABLET ORAL at 03:20

## 2022-11-03 RX ADMIN — METHOCARBAMOL 500 MILLIGRAM(S): 500 TABLET, FILM COATED ORAL at 15:07

## 2022-11-03 RX ADMIN — ATORVASTATIN CALCIUM 40 MILLIGRAM(S): 80 TABLET, FILM COATED ORAL at 21:29

## 2022-11-03 RX ADMIN — Medication 2: at 21:58

## 2022-11-03 RX ADMIN — Medication 4: at 17:53

## 2022-11-03 RX ADMIN — BRIMONIDINE TARTRATE 1 DROP(S): 2 SOLUTION/ DROPS OPHTHALMIC at 21:30

## 2022-11-03 RX ADMIN — Medication 1000 MILLIGRAM(S): at 21:29

## 2022-11-03 RX ADMIN — TRAMADOL HYDROCHLORIDE 50 MILLIGRAM(S): 50 TABLET ORAL at 04:00

## 2022-11-03 RX ADMIN — Medication 5 MILLIGRAM(S): at 05:48

## 2022-11-03 RX ADMIN — Medication 1000 MILLIGRAM(S): at 15:06

## 2022-11-03 RX ADMIN — Medication 50 MILLIGRAM(S): at 04:10

## 2022-11-03 RX ADMIN — POLYETHYLENE GLYCOL 3350 17 GRAM(S): 17 POWDER, FOR SOLUTION ORAL at 19:30

## 2022-11-03 RX ADMIN — LATANOPROST 1 DROP(S): 0.05 SOLUTION/ DROPS OPHTHALMIC; TOPICAL at 21:06

## 2022-11-03 NOTE — PROGRESS NOTE ADULT - SUBJECTIVE AND OBJECTIVE BOX
SUBJECTIVE: Patient endorses continued back pain however stomach discomfort/gas is more distressing to her. Denies nausea/vomiting, diarrhea, chest pain, shortness of breath, new weakness, numbness.     HOSPITAL COURSE:  10/26: POD 0 L4-Pelvis fusion. HMV x 2, ANA LUISA x 1  10/27: POD 1 lumbar fusion. JAMAL o/n. Given 1 U prbc for Hgb 6.4. Hemoglobin 8.5 post transfusion. Jason d/c'ed this morning, passed TOV. Pending standing x-rays   10/28: POD 2 L4-S1 fusion. Drains remain, Pending XR. Recieved 1 U prbc for Hgb 7.7. Responded well Hgb increased to 8.8. Hemodynamically stable.   10/29: POD 3 L4-S1 fusion. HMV/ANA LUISA in place. Pending AR. elevated FS, 10u regular insulin.  10/30: POD 4 L4-S1 fusion. HMV/ANA LUISA in place. Dulcolax suppository ordered for am, manually disimpacted in by  yesterday.   10/31: POD5. JAMAL overnight. SBP 85 this morning 500c bolus given  11/1: POD6. given additional 50mg tramadol for pain, pending post-op lumbar spine CT, had BM yesterday, HMV output 100/120. Pending AR  11/2: POD7. insulin held as per overnight hospitalist Dr. Aguilera. Will monitor sliding coverage x24hrs to determine lispro/lantus needs. Given 500 cc bolus for BP 90s  11/3: POD 8. JAMAL o/n.     Vital Signs Last 24 Hrs  T(C): 36.7 (02 Nov 2022 23:30), Max: 37 (02 Nov 2022 12:59)  T(F): 98 (02 Nov 2022 23:30), Max: 98.6 (02 Nov 2022 12:59)  HR: 72 (02 Nov 2022 23:30) (65 - 77)  BP: 93/60 (02 Nov 2022 23:30) (92/59 - 111/67)  BP(mean): --  RR: 18 (02 Nov 2022 23:30) (16 - 18)  SpO2: 97% (02 Nov 2022 23:30) (94% - 98%)    Parameters below as of 02 Nov 2022 23:30  Patient On (Oxygen Delivery Method): room air    I&O's Summary    01 Nov 2022 07:01  -  02 Nov 2022 07:00  --------------------------------------------------------  IN: 0 mL / OUT: 440 mL / NET: -440 mL    02 Nov 2022 07:01  -  03 Nov 2022 02:05  --------------------------------------------------------  IN: 0 mL / OUT: 445 mL / NET: -445 mL    PHYSICAL EXAM:  General: NAD, pt is comfortably sitting up in bed, on room air  HEENT: EOMI b/l, face symmetric, tongue midline, neck FROM  Cardiovascular: Regular rate and rhythm  Respiratory: nonlabored breathing, normal chest rise  GI: abdomen soft, nontender, nondistended  Neuro: CN II-XII grossly intact, PERRL 3mm briskly reactive   A&Ox3, No aphasia, speech clear, no dysmetria, no pronator drift. Follows commands.  MCCLENDON x4 spontaneously, UE 5/5, b/l LE 2/5 HF, 3/5 KF/KE, DF/PF/EHL 5/5.  Extremities: distal pulses 2+     LABS:                        10.8   7.29  )-----------( 319      ( 02 Nov 2022 07:04 )             33.2     11-02    141  |  107  |  12  ----------------------------<  114<H>  5.0   |  26  |  0.26<L>    Ca    8.7      02 Nov 2022 07:04  Phos  4.3     11-02  Mg     2.1     11-02    TPro  x   /  Alb  x   /  TBili  0.3  /  DBili  x   /  AST  x   /  ALT  x   /  AlkPhos  x   11-02    PT/INR - ( 02 Nov 2022 07:04 )   PT: 11.1 sec;   INR: 0.93       CAPILLARY BLOOD GLUCOSE    POCT Blood Glucose.: 223 mg/dL (02 Nov 2022 21:03)  POCT Blood Glucose.: 142 mg/dL (02 Nov 2022 16:57)  POCT Blood Glucose.: 185 mg/dL (02 Nov 2022 11:59)  POCT Blood Glucose.: 132 mg/dL (02 Nov 2022 07:20)  POCT Blood Glucose.: 124 mg/dL (02 Nov 2022 02:26)    Drug Levels: [] N/A    CSF Analysis: [] N/A    Allergies    No Known Allergies    Intolerances    MEDICATIONS:  Antibiotics:    Neuro:  acetaminophen     Tablet .. 1000 milliGRAM(s) Oral every 8 hours  methocarbamol 500 milliGRAM(s) Oral every 8 hours PRN  ondansetron   Disintegrating Tablet 4 milliGRAM(s) Oral every 6 hours  pregabalin 50 milliGRAM(s) Oral three times a day  traMADol 25 milliGRAM(s) Oral every 6 hours PRN  traMADol 50 milliGRAM(s) Oral every 6 hours PRN    Anticoagulation:  enoxaparin Injectable 40 milliGRAM(s) SubCutaneous <User Schedule>    OTHER:  atorvastatin 40 milliGRAM(s) Oral at bedtime  bisacodyl 5 milliGRAM(s) Oral every 12 hours PRN  brimonidine 0.2% Ophthalmic Solution 1 Drop(s) Both EYES every 8 hours  dorzolamide 2% Ophthalmic Solution 1 Drop(s) Both EYES every 8 hours  famotidine    Tablet 20 milliGRAM(s) Oral every 12 hours PRN  insulin lispro (ADMELOG) corrective regimen sliding scale   SubCutaneous Before meals and at bedtime  latanoprost 0.005% Ophthalmic Solution 1 Drop(s) Both EYES at bedtime  oxybutynin 5 milliGRAM(s) Oral two times a day  senna 2 Tablet(s) Oral at bedtime  simethicone 80 milliGRAM(s) Chew every 8 hours PRN    IVF:    CULTURES:    RADIOLOGY & ADDITIONAL TESTS:      ASSESSMENT:  62y F with pmh polio with chronic b/l lower extremity weakness, DM, asthma, and HLD now s/p L5-S1 orellana laminectomy, L5-S1 TLIF, L4-S2/AI fusion (10/26).    Plan:  Neuro:  - neuro checks/vital signs q4hrs   - pain control tylenol/tramadol   - postop xrays complete  - decadron 4q6 x 3 days  - monitor HMV x1  - ANA LUISA x1 removed 10/30    Cardiac:  - SBP goal 100-160  - Hold home BP meds: HCTZ, amlodipine, losartan    Pulmonary:  - on RA, IS    GI:  - carb consistent diet   - bowel regimen  - manually disimpacted 10/29    Renal/:  - IVL, voiding  - cont home oxybutynin 5mg BID    Heme:  - SCDs, SQL for DVT prophylaxis  - s/p 1 U PRBC 10/27, 10/28, H/H stable     Endo:  - ISS, A1C 6.3  - Lantus/lispro discontinued for low BG   - cont home lipitor 40mg     ID:  - afebrile, no leukocytosis    Disposition: SDU status, full code, PT/OT recc AR     Assessment and Plan d/w Dr. Manzanares

## 2022-11-03 NOTE — PROGRESS NOTE ADULT - SUBJECTIVE AND OBJECTIVE BOX
SUBJECTIVE:  Patient seen and examined at bedside.  Back pain stable.  Ambulated in lala with PT yesterday, felt some lightheadedness but was not orthostatic, eager to continue to ambulate.  Last BM yesterday.  Eating good PO intake, adding extra salt to her food    ROS:  Denies fevers, chills, headache, vision changes, neck pain, cough, SOB, chest pain, Abdominal pain, N/V, dysuria or new rash.  All other ROS negative except as above    Vital Signs Last 12 Hrs  T(F): 98.8 (11-03-22 @ 12:56), Max: 98.8 (11-03-22 @ 12:56)  HR: 76 (11-03-22 @ 13:20) (65 - 76)  BP: 109/77 (11-03-22 @ 13:20) (97/61 - 113/77)  BP(mean): 73 (11-03-22 @ 08:38) (73 - 73)  RR: 18 (11-03-22 @ 12:56) (18 - 18)  SpO2: 96% (11-03-22 @ 12:56) (96% - 98%)  I&O's Summary    02 Nov 2022 07:01  -  03 Nov 2022 07:00  --------------------------------------------------------  IN: 0 mL / OUT: 1245 mL / NET: -1245 mL    03 Nov 2022 07:01  -  03 Nov 2022 13:38  --------------------------------------------------------  IN: 0 mL / OUT: 800 mL / NET: -800 mL        PHYSICAL EXAM:  Constitutional: NAD, comfortable lying in bed.  HEENT: PERRLA, EOMI, no conjunctival pallor or scleral icterus, MMM  Neck: Supple, no JVD  Respiratory: CTA B/L. No w/r/r.   Cardiovascular: RRR, normal S1 and S2, no m/r/g.  Back: Hemovac in place   Gastrointestinal: +BS, soft NTND, no guarding or rebound tenderness, no palpable masses   Extremities: wwp; no cyanosis, clubbing or edema.   Vascular: Pulses equal and strong throughout.   Neurological: AAOx3, strength 5/5 b/l UEs, 3+/5 b/l LEs and sensation intact throughout.   Skin: No gross skin abnormalities or rashes        LABS:                        10.8   7.29  )-----------( 319      ( 02 Nov 2022 07:04 )             33.2     11-02    141  |  107  |  12  ----------------------------<  114<H>  5.0   |  26  |  0.26<L>    Ca    8.7      02 Nov 2022 07:04  Phos  4.3     11-02  Mg     2.1     11-02    TPro  x   /  Alb  x   /  TBili  0.3  /  DBili  x   /  AST  x   /  ALT  x   /  AlkPhos  x   11-02    PT/INR - ( 02 Nov 2022 07:04 )   PT: 11.1 sec;   INR: 0.93                  RADIOLOGY & ADDITIONAL TESTS:  < from: CT Lumbar Spine No Cont (11.02.22 @ 11:53) >  IMPRESSION:    Intact hardware status post L4-pelvis posterior fusion.    L5-S1 discectomy and cage placement, decompressive laminectomy, right   greater than left foraminotomy and correction of listhesis.    Dextrocurvature is increased, perhaps related to patient   positioning/guarding.    < end of copied text >      MEDICATIONS  (STANDING):  acetaminophen     Tablet .. 1000 milliGRAM(s) Oral every 8 hours  atorvastatin 40 milliGRAM(s) Oral at bedtime  brimonidine 0.2% Ophthalmic Solution 1 Drop(s) Both EYES every 8 hours  dorzolamide 2% Ophthalmic Solution 1 Drop(s) Both EYES every 8 hours  enoxaparin Injectable 40 milliGRAM(s) SubCutaneous <User Schedule>  insulin lispro (ADMELOG) corrective regimen sliding scale   SubCutaneous Before meals and at bedtime  latanoprost 0.005% Ophthalmic Solution 1 Drop(s) Both EYES at bedtime  ondansetron   Disintegrating Tablet 4 milliGRAM(s) Oral every 6 hours  oxybutynin 5 milliGRAM(s) Oral two times a day  pregabalin 50 milliGRAM(s) Oral three times a day  senna 2 Tablet(s) Oral at bedtime    MEDICATIONS  (PRN):  bisacodyl 5 milliGRAM(s) Oral every 12 hours PRN Constipation  famotidine    Tablet 20 milliGRAM(s) Oral every 12 hours PRN Dyspepsia  methocarbamol 500 milliGRAM(s) Oral every 8 hours PRN Muscle Spasm  simethicone 80 milliGRAM(s) Chew every 8 hours PRN Gas  traMADol 25 milliGRAM(s) Oral every 6 hours PRN Moderate Pain (4 - 6)  traMADol 50 milliGRAM(s) Oral every 6 hours PRN Severe Pain (7 - 10)

## 2022-11-04 ENCOUNTER — TRANSCRIPTION ENCOUNTER (OUTPATIENT)
Age: 62
End: 2022-11-04

## 2022-11-04 VITALS — DIASTOLIC BLOOD PRESSURE: 63 MMHG | SYSTOLIC BLOOD PRESSURE: 98 MMHG | HEART RATE: 72 BPM

## 2022-11-04 LAB
ANION GAP SERPL CALC-SCNC: 7 MMOL/L — SIGNIFICANT CHANGE UP (ref 5–17)
BUN SERPL-MCNC: 10 MG/DL — SIGNIFICANT CHANGE UP (ref 7–23)
CALCIUM SERPL-MCNC: 8.8 MG/DL — SIGNIFICANT CHANGE UP (ref 8.4–10.5)
CHLORIDE SERPL-SCNC: 103 MMOL/L — SIGNIFICANT CHANGE UP (ref 96–108)
CO2 SERPL-SCNC: 27 MMOL/L — SIGNIFICANT CHANGE UP (ref 22–31)
CREAT SERPL-MCNC: 0.27 MG/DL — LOW (ref 0.5–1.3)
EGFR: 124 ML/MIN/1.73M2 — SIGNIFICANT CHANGE UP
GLUCOSE BLDC GLUCOMTR-MCNC: 120 MG/DL — HIGH (ref 70–99)
GLUCOSE BLDC GLUCOMTR-MCNC: 216 MG/DL — HIGH (ref 70–99)
GLUCOSE SERPL-MCNC: 125 MG/DL — HIGH (ref 70–99)
HCT VFR BLD CALC: 30.2 % — LOW (ref 34.5–45)
HGB BLD-MCNC: 9.8 G/DL — LOW (ref 11.5–15.5)
MAGNESIUM SERPL-MCNC: 1.9 MG/DL — SIGNIFICANT CHANGE UP (ref 1.6–2.6)
MCHC RBC-ENTMCNC: 29.8 PG — SIGNIFICANT CHANGE UP (ref 27–34)
MCHC RBC-ENTMCNC: 32.5 GM/DL — SIGNIFICANT CHANGE UP (ref 32–36)
MCV RBC AUTO: 91.8 FL — SIGNIFICANT CHANGE UP (ref 80–100)
NRBC # BLD: 0 /100 WBCS — SIGNIFICANT CHANGE UP (ref 0–0)
PHOSPHATE SERPL-MCNC: 4.3 MG/DL — SIGNIFICANT CHANGE UP (ref 2.5–4.5)
PLATELET # BLD AUTO: 316 K/UL — SIGNIFICANT CHANGE UP (ref 150–400)
POTASSIUM SERPL-MCNC: 4.1 MMOL/L — SIGNIFICANT CHANGE UP (ref 3.5–5.3)
POTASSIUM SERPL-SCNC: 4.1 MMOL/L — SIGNIFICANT CHANGE UP (ref 3.5–5.3)
RBC # BLD: 3.29 M/UL — LOW (ref 3.8–5.2)
RBC # FLD: 14.7 % — HIGH (ref 10.3–14.5)
SODIUM SERPL-SCNC: 137 MMOL/L — SIGNIFICANT CHANGE UP (ref 135–145)
WBC # BLD: 6.81 K/UL — SIGNIFICANT CHANGE UP (ref 3.8–10.5)
WBC # FLD AUTO: 6.81 K/UL — SIGNIFICANT CHANGE UP (ref 3.8–10.5)

## 2022-11-04 PROCEDURE — U0005: CPT

## 2022-11-04 PROCEDURE — 76000 FLUOROSCOPY <1 HR PHYS/QHP: CPT

## 2022-11-04 PROCEDURE — 83605 ASSAY OF LACTIC ACID: CPT

## 2022-11-04 PROCEDURE — 83036 HEMOGLOBIN GLYCOSYLATED A1C: CPT

## 2022-11-04 PROCEDURE — 85025 COMPLETE CBC W/AUTO DIFF WBC: CPT

## 2022-11-04 PROCEDURE — C1889: CPT

## 2022-11-04 PROCEDURE — 84100 ASSAY OF PHOSPHORUS: CPT

## 2022-11-04 PROCEDURE — 97116 GAIT TRAINING THERAPY: CPT

## 2022-11-04 PROCEDURE — 86923 COMPATIBILITY TEST ELECTRIC: CPT

## 2022-11-04 PROCEDURE — 99232 SBSQ HOSP IP/OBS MODERATE 35: CPT

## 2022-11-04 PROCEDURE — 36415 COLL VENOUS BLD VENIPUNCTURE: CPT

## 2022-11-04 PROCEDURE — 97535 SELF CARE MNGMENT TRAINING: CPT

## 2022-11-04 PROCEDURE — 85018 HEMOGLOBIN: CPT

## 2022-11-04 PROCEDURE — C1713: CPT

## 2022-11-04 PROCEDURE — 36430 TRANSFUSION BLD/BLD COMPNT: CPT

## 2022-11-04 PROCEDURE — 84132 ASSAY OF SERUM POTASSIUM: CPT

## 2022-11-04 PROCEDURE — P9016: CPT

## 2022-11-04 PROCEDURE — 97110 THERAPEUTIC EXERCISES: CPT

## 2022-11-04 PROCEDURE — 84295 ASSAY OF SERUM SODIUM: CPT

## 2022-11-04 PROCEDURE — 85027 COMPLETE CBC AUTOMATED: CPT

## 2022-11-04 PROCEDURE — 83735 ASSAY OF MAGNESIUM: CPT

## 2022-11-04 PROCEDURE — 97161 PT EVAL LOW COMPLEX 20 MIN: CPT

## 2022-11-04 PROCEDURE — 82962 GLUCOSE BLOOD TEST: CPT

## 2022-11-04 PROCEDURE — P9047: CPT

## 2022-11-04 PROCEDURE — 97162 PT EVAL MOD COMPLEX 30 MIN: CPT

## 2022-11-04 PROCEDURE — 99024 POSTOP FOLLOW-UP VISIT: CPT

## 2022-11-04 PROCEDURE — U0003: CPT

## 2022-11-04 PROCEDURE — 74019 RADEX ABDOMEN 2 VIEWS: CPT

## 2022-11-04 PROCEDURE — 72100 X-RAY EXAM L-S SPINE 2/3 VWS: CPT

## 2022-11-04 PROCEDURE — 86901 BLOOD TYPING SEROLOGIC RH(D): CPT

## 2022-11-04 PROCEDURE — 82947 ASSAY GLUCOSE BLOOD QUANT: CPT

## 2022-11-04 PROCEDURE — 72131 CT LUMBAR SPINE W/O DYE: CPT

## 2022-11-04 PROCEDURE — 82330 ASSAY OF CALCIUM: CPT

## 2022-11-04 PROCEDURE — 86803 HEPATITIS C AB TEST: CPT

## 2022-11-04 PROCEDURE — C9399: CPT

## 2022-11-04 PROCEDURE — 86900 BLOOD TYPING SEROLOGIC ABO: CPT

## 2022-11-04 PROCEDURE — 97530 THERAPEUTIC ACTIVITIES: CPT

## 2022-11-04 PROCEDURE — 80048 BASIC METABOLIC PNL TOTAL CA: CPT

## 2022-11-04 PROCEDURE — 86850 RBC ANTIBODY SCREEN: CPT

## 2022-11-04 RX ORDER — FAMOTIDINE 10 MG/ML
1 INJECTION INTRAVENOUS
Qty: 0 | Refills: 0 | DISCHARGE
Start: 2022-11-04

## 2022-11-04 RX ORDER — ENOXAPARIN SODIUM 100 MG/ML
40 INJECTION SUBCUTANEOUS
Qty: 0 | Refills: 0 | DISCHARGE
Start: 2022-11-04

## 2022-11-04 RX ORDER — LATANOPROST 0.05 MG/ML
0 SOLUTION/ DROPS OPHTHALMIC; TOPICAL
Qty: 0 | Refills: 0 | DISCHARGE

## 2022-11-04 RX ORDER — TRAMADOL HYDROCHLORIDE 50 MG/1
0.5 TABLET ORAL
Qty: 0 | Refills: 0 | DISCHARGE
Start: 2022-11-04

## 2022-11-04 RX ORDER — DORZOLAMIDE HYDROCHLORIDE 20 MG/ML
0 SOLUTION/ DROPS OPHTHALMIC
Qty: 0 | Refills: 0 | DISCHARGE

## 2022-11-04 RX ORDER — LATANOPROST 0.05 MG/ML
1 SOLUTION/ DROPS OPHTHALMIC; TOPICAL
Qty: 0 | Refills: 0 | DISCHARGE

## 2022-11-04 RX ORDER — BRIMONIDINE TARTRATE 2 MG/MG
0 SOLUTION/ DROPS OPHTHALMIC
Qty: 0 | Refills: 0 | DISCHARGE

## 2022-11-04 RX ORDER — AMLODIPINE BESYLATE 2.5 MG/1
1 TABLET ORAL
Qty: 0 | Refills: 0 | DISCHARGE

## 2022-11-04 RX ORDER — SITAGLIPTIN AND METFORMIN HYDROCHLORIDE 500; 50 MG/1; MG/1
1 TABLET, FILM COATED ORAL
Qty: 0 | Refills: 0 | DISCHARGE

## 2022-11-04 RX ORDER — DORZOLAMIDE HYDROCHLORIDE 20 MG/ML
1 SOLUTION/ DROPS OPHTHALMIC
Qty: 0 | Refills: 0 | DISCHARGE

## 2022-11-04 RX ORDER — BRIMONIDINE TARTRATE 2 MG/MG
1 SOLUTION/ DROPS OPHTHALMIC
Qty: 0 | Refills: 0 | DISCHARGE

## 2022-11-04 RX ORDER — METHOCARBAMOL 500 MG/1
1 TABLET, FILM COATED ORAL
Qty: 0 | Refills: 0 | DISCHARGE
Start: 2022-11-04

## 2022-11-04 RX ORDER — SIMETHICONE 80 MG/1
1 TABLET, CHEWABLE ORAL
Qty: 0 | Refills: 0 | DISCHARGE
Start: 2022-11-04

## 2022-11-04 RX ORDER — TRAMADOL HYDROCHLORIDE 50 MG/1
1 TABLET ORAL
Qty: 0 | Refills: 0 | DISCHARGE
Start: 2022-11-04

## 2022-11-04 RX ORDER — ACETAMINOPHEN 500 MG
2 TABLET ORAL
Qty: 0 | Refills: 0 | DISCHARGE
Start: 2022-11-04

## 2022-11-04 RX ORDER — SENNA PLUS 8.6 MG/1
2 TABLET ORAL
Qty: 0 | Refills: 0 | DISCHARGE
Start: 2022-11-04

## 2022-11-04 RX ORDER — INSULIN LISPRO 100/ML
1 VIAL (ML) SUBCUTANEOUS
Qty: 0 | Refills: 0 | DISCHARGE
Start: 2022-11-04

## 2022-11-04 RX ADMIN — Medication 50 MILLIGRAM(S): at 06:51

## 2022-11-04 RX ADMIN — TRAMADOL HYDROCHLORIDE 50 MILLIGRAM(S): 50 TABLET ORAL at 11:45

## 2022-11-04 RX ADMIN — BRIMONIDINE TARTRATE 1 DROP(S): 2 SOLUTION/ DROPS OPHTHALMIC at 06:40

## 2022-11-04 RX ADMIN — TRAMADOL HYDROCHLORIDE 50 MILLIGRAM(S): 50 TABLET ORAL at 03:11

## 2022-11-04 RX ADMIN — Medication 1000 MILLIGRAM(S): at 07:51

## 2022-11-04 RX ADMIN — Medication 4: at 11:35

## 2022-11-04 RX ADMIN — Medication 1000 MILLIGRAM(S): at 13:18

## 2022-11-04 RX ADMIN — DORZOLAMIDE HYDROCHLORIDE 1 DROP(S): 20 SOLUTION/ DROPS OPHTHALMIC at 06:40

## 2022-11-04 RX ADMIN — TRAMADOL HYDROCHLORIDE 50 MILLIGRAM(S): 50 TABLET ORAL at 02:11

## 2022-11-04 RX ADMIN — TRAMADOL HYDROCHLORIDE 50 MILLIGRAM(S): 50 TABLET ORAL at 10:45

## 2022-11-04 RX ADMIN — Medication 50 MILLIGRAM(S): at 13:18

## 2022-11-04 RX ADMIN — Medication 1000 MILLIGRAM(S): at 06:51

## 2022-11-04 NOTE — PROGRESS NOTE ADULT - SUBJECTIVE AND OBJECTIVE BOX
SUBJECTIVE:  Patient seen and examined at bedside.  Back pain stable.  Ambulated again with PT yesterday and reports progress. Less dizzy and remained without orthostatic hypotension.      ROS:  Denies fevers, chills, headache, vision changes, neck pain, cough, SOB, chest pain, Abdominal pain, N/V, dysuria or new rash.  All other ROS negative except as above    Vital Signs Last 12 Hrs  T(F): 98.3 (11-04-22 @ 08:36), Max: 98.6 (11-04-22 @ 05:15)  HR: 67 (11-04-22 @ 10:42) (64 - 69)  BP: 92/59 (11-04-22 @ 10:42) (91/58 - 100/64)  BP(mean): --  RR: 15 (11-04-22 @ 10:42) (15 - 18)  SpO2: 94% (11-04-22 @ 10:42) (94% - 96%)  I&O's Summary    03 Nov 2022 07:01  -  04 Nov 2022 07:00  --------------------------------------------------------  IN: 0 mL / OUT: 1103 mL / NET: -1103 mL    04 Nov 2022 07:01  -  04 Nov 2022 13:29  --------------------------------------------------------  IN: 0 mL / OUT: 200 mL / NET: -200 mL        PHYSICAL EXAM:  Constitutional: NAD, comfortable lying in bed.  HEENT: PERRLA, EOMI, no conjunctival pallor or scleral icterus, MMM  Neck: Supple, no JVD  Respiratory: CTA B/L. No w/r/r.   Cardiovascular: RRR, normal S1 and S2, no m/r/g.  Back: Hemovac in place   Gastrointestinal: +BS, soft NTND, no guarding or rebound tenderness, no palpable masses   Extremities: wwp; no cyanosis, clubbing or edema.   Vascular: Pulses equal and strong throughout.   Neurological: AAOx3, strength 5/5 b/l UEs, 3+/5 b/l LEs and sensation intact throughout.   Skin: No gross skin abnormalities or rashes        LABS:                        9.8    6.81  )-----------( 316      ( 04 Nov 2022 06:14 )             30.2     11-04    137  |  103  |  10  ----------------------------<  125<H>  4.1   |  27  |  0.27<L>    Ca    8.8      04 Nov 2022 06:14  Phos  4.3     11-04  Mg     1.9     11-04              RADIOLOGY & ADDITIONAL TESTS:  No new imaging    MEDICATIONS  (STANDING):  acetaminophen     Tablet .. 1000 milliGRAM(s) Oral every 8 hours  atorvastatin 40 milliGRAM(s) Oral at bedtime  brimonidine 0.2% Ophthalmic Solution 1 Drop(s) Both EYES every 8 hours  dorzolamide 2% Ophthalmic Solution 1 Drop(s) Both EYES every 8 hours  enoxaparin Injectable 40 milliGRAM(s) SubCutaneous <User Schedule>  insulin lispro (ADMELOG) corrective regimen sliding scale   SubCutaneous Before meals and at bedtime  latanoprost 0.005% Ophthalmic Solution 1 Drop(s) Both EYES at bedtime  oxybutynin 5 milliGRAM(s) Oral two times a day  pregabalin 50 milliGRAM(s) Oral three times a day  senna 2 Tablet(s) Oral at bedtime    MEDICATIONS  (PRN):  bisacodyl 5 milliGRAM(s) Oral every 12 hours PRN Constipation  famotidine    Tablet 20 milliGRAM(s) Oral every 12 hours PRN Dyspepsia  methocarbamol 500 milliGRAM(s) Oral every 8 hours PRN Muscle Spasm  simethicone 80 milliGRAM(s) Chew every 8 hours PRN Gas  traMADol 25 milliGRAM(s) Oral every 6 hours PRN Moderate Pain (4 - 6)  traMADol 50 milliGRAM(s) Oral every 6 hours PRN Severe Pain (7 - 10)

## 2022-11-04 NOTE — PROGRESS NOTE ADULT - SUBJECTIVE AND OBJECTIVE BOX
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 She has a longstanding history of right lower extremity weakness due to childhood illness which is suspicious for polio. She recently has had severe back and leg pain as well as many recent falls. 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 Pt is here for an elective surgery by Dr Manzanares.       (28 Oct 2022 09:05)                            9.8    6.81  )-----------( 316      ( 04 Nov 2022 06:14 )             30.2       11-04    137  |  103  |  10  ----------------------------<  125<H>  4.1   |  27  |  0.27<L>    Ca    8.8      04 Nov 2022 06:14  Phos  4.3     11-04  Mg     1.9     11-04      Vital Signs Last 24 Hrs  T(C): 36.8 (04 Nov 2022 08:36), Max: 37.9 (03 Nov 2022 16:34)  T(F): 98.3 (04 Nov 2022 08:36), Max: 100.3 (03 Nov 2022 16:34)  HR: 67 (04 Nov 2022 10:42) (64 - 78)  BP: 92/59 (04 Nov 2022 10:42) (91/58 - 113/77)  BP(mean): --  RR: 15 (04 Nov 2022 10:42) (15 - 18)  SpO2: 94% (04 Nov 2022 10:42) (94% - 97%)    Parameters below as of 04 Nov 2022 10:42  Patient On (Oxygen Delivery Method): room air        MEDICATIONS  (STANDING):  acetaminophen     Tablet .. 1000 milliGRAM(s) Oral every 8 hours  atorvastatin 40 milliGRAM(s) Oral at bedtime  brimonidine 0.2% Ophthalmic Solution 1 Drop(s) Both EYES every 8 hours  dorzolamide 2% Ophthalmic Solution 1 Drop(s) Both EYES every 8 hours  enoxaparin Injectable 40 milliGRAM(s) SubCutaneous <User Schedule>  insulin lispro (ADMELOG) corrective regimen sliding scale   SubCutaneous Before meals and at bedtime  latanoprost 0.005% Ophthalmic Solution 1 Drop(s) Both EYES at bedtime  oxybutynin 5 milliGRAM(s) Oral two times a day  pregabalin 50 milliGRAM(s) Oral three times a day  senna 2 Tablet(s) Oral at bedtime    MEDICATIONS  (PRN):  bisacodyl 5 milliGRAM(s) Oral every 12 hours PRN Constipation  famotidine    Tablet 20 milliGRAM(s) Oral every 12 hours PRN Dyspepsia  methocarbamol 500 milliGRAM(s) Oral every 8 hours PRN Muscle Spasm  simethicone 80 milliGRAM(s) Chew every 8 hours PRN Gas  traMADol 25 milliGRAM(s) Oral every 6 hours PRN Moderate Pain (4 - 6)  traMADol 50 milliGRAM(s) Oral every 6 hours PRN Severe Pain (7 - 10)       Physical / Occupational Therapy Functional Status Assessment :   11/3/2022     Pain Assessment/Number Scale (0-10) Adult  Presence of Pain: complains of pain/discomfort  Body Location: back   Pain Rating (0-10): Rest: 7   Pain Rating (0-10): Activity: 9   Pain Alleviating Factors: RN Eulalia is notified.     Cognitive/Neuro      Cognitive/Neuro/Behavioral  Cognitive/Neuro/Behavioral [WDL Definition: Alert; opens eyes spontaneously; arouses to voice or touch; oriented x 4; follows commands; speech spontaneous, logical; purposeful motor response; behavior appropriate to situation]: WDL    Language Assistance  Preferred Language to Address Healthcare Preferred Language to Address Healthcare: Azeri  Preferred Sign Language Preferred Sign Language: Pt can understand and speak English     Therapeutic Interventions      Bed Mobility  Bed Mobility Training Rolling/Turning: minimum assist (75% patient effort);  1 person assist;  verbal cues;  log roll   Bed Mobility Training Scooting: contact guard;  1 person assist;  verbal cues  Bed Mobility Training Sit-to-Supine: min A x 2 with log roll   Bed Mobility Training Supine-to-Sit: moderate assist (50% patient effort);  minimum assist (75% patient effort);  2 person assist;  log roll   Bed Mobility Training Limitations: decreased ability to use arms for pushing/pulling;  impaired ability to control trunk for mobility;  decreased ability to use legs for bridging/pushing;  decreased strength;  impaired balance;  impaired postural control;  pain    Sit-Stand Transfer Training  Transfer Training Sit-to-Stand Transfer: minimum assist (75% patient effort);  2 person assist;  rolling walker;  2 trials   Transfer Training Stand-to-Sit Transfer: minimum assist (75% patient effort);  2 person assist;  rolling walker;  2 trials   Sit-to-Stand Transfer Training Transfer Safety Analysis: decreased balance;  decreased strength;  impaired balance;  impaired postural control;  pain;  rolling walker    Toilet Transfer Training  Transfer Training Toilet Transfer: minimum assist (75% patient effort);  2 person assist;  rolling walker  Toilet Transfer Training Transfer Safety Analysis: decreased balance;  decreased strength;  impaired balance;  impaired postural control;  pain;  rolling walker    Gait Training  Gait Training: minimum assist (75% patient effort);  1 person assist;  verbal cues;  Wheelchair follow    rolling walker;  35 feet x 2   Gait Analysis: swing-to gait   decreased carlos;  increased time in double stance;  decreased hip/knee flexion;  decreased step length;  slightly unsteady gait, no lose of balance, decreased heels trike and hip flexion bilaterally, hip hiking and circumduction on RLE during ambulation, requires occasional assistance from PT to steer rolling walker during ambulation. ;  decreased strength;  impaired balance;  impaired postural control;  pain;  rolling walker    Therapeutic Exercise  Therapeutic Exercise Detail: Supine ther ex: ankle pump x 10 bilaterally AROM, heel slide x 10 bilaterally AROM LLE, AAROM RLE.Sensation: LLE intact, RLE thigh intact, decreased sensation at lateral aspect of lower leg and foot with numbness.Strength: LLE~3+/5 t/o, RLE: hip flexion~2+/5, L knee ext~ 2/5, L knee flex~2+/5, L ankle DF/PF~2/5     Toilet Transfer Training  Transfer Training Toilet Transfer: minimum assist (75% patient effort);  2 person assist;  nonverbal cues (demo/gestures);  verbal cues;  rolling walker  Toilet Transfer Training Transfer Safety Analysis: decreased weight-shifting ability;  decreased strength;  impaired balance;  decreased flexibility;  impaired postural control;  pain;  rolling walker    Therapeutic Exercise  Therapeutic Exercise Detail: functional mobility training with pt able to ambulate in room/hallway with Min Ax1 and wheelchair follow, demo decreased ability to advance/clear RLE during swing phase of gait 2/2 decreased motor control/strength of RLE. Pt required additional assist of 1 person for wheelchair follow.     Lower Body Dressing Training  Lower Body Dressing Training Assistance: maximum assist (25% patient effort);  1 person assist;  verbal cues;  don/doff b/l shoes ;  impaired balance;  decreased strength;  decreased flexibility;  impaired postural control    Toilet Training  Toilet Training Assistance: stand-by assist;  1 person assist;  verbal cues;  decreased strength;  impaired balance;  impaired postural control;  pain                PM&R Impression : as above    Current Disposition Plan Recommendations :    acute rehab placement

## 2022-11-04 NOTE — PROGRESS NOTE ADULT - PROBLEM SELECTOR PROBLEM 7
Hypercholesterolemia
Hypercholesterolemia
Poliomyelitis
Poliomyelitis
Hypercholesterolemia

## 2022-11-04 NOTE — PROGRESS NOTE ADULT - PROBLEM SELECTOR PLAN 6
-c/w Atorvastatin
- holding home amlodipine and Irbesartan/HCTZ combo due to hypotension  - patient should be discharged off all antihypertensives and outpatient follow up to determine when safe to resume
-c/w Atorvastatin
- holding home amlodipine and Irbesartan/HCTZ combo due to hypotension, see orthostatic hypotension as above  - patient should be discharged off all antihypertensives and outpatient follow up to determine when safe to resume
- holding home amlodipine and Irbesartan/HCTZ combo due to hypotension
- holding home amlodipine and Irbesartan/HCTZ combo due to hypotension, see orthostatic hypotension as above  - patient should be discharged off all antihypertensives and outpatient follow up to determine when safe to resume
- holding home amlodipine and Irbesartan/HCTZ combo due to hypotension, see orthostatic hypotension as above  - patient should be discharged off all antihypertensives and outpatient follow up to determine when safe to resume

## 2022-11-04 NOTE — PROGRESS NOTE ADULT - PROBLEM SELECTOR PROBLEM 2
Acute blood loss anemia
Orthostatic hypotension
Orthostatic hypotension
Acute blood loss anemia
Abdominal pain
Orthostatic hypotension
Abdominal pain

## 2022-11-04 NOTE — PROGRESS NOTE ADULT - ASSESSMENT
per Neurosurgery    62 y o F with pmh polio with chronic b/l lower extremity weakness, DM, asthma, and HLD now s/p L5-S1 orellana laminectomy, L5-S1 TLIF, L4-S2/AI fusion (10/26).    Plan:  Neuro:  - neuro checks/vital signs q4hrs   - pain control tylenol/tramadol   - postop xrays complete  - decadron 4q6 x 3 days completed  - monitor HMV x1    Cardiac:  - SBP goal 100-160  - Hold home BP meds: HCTZ, amlodipine, losartan    Pulmonary:  - on RA, IS    GI:  - carb consistent diet   - bowel regimen  - manually disimpacted 10/29    Renal/:  - IVL, voiding  - cont home oxybutynin 5mg BID    Heme:  - SCDs, SQL for DVT prophylaxis  - s/p 1 U PRBC 10/27, 10/28, H/H stable     Endo:  - ISS, A1C 6.3  - Lantus/lispro discontinued for low BG. Continue ISS   - cont home lipitor 40mg     ID:  - afebrile, no leukocytosis    Disposition: SDU status, full code, PT/OT recc AR   
62y F with pmh polio with chronic b/l lower extremity weakness, DM, asthma, and HLD now s/p L5-S1 orellana laminectomy, L5-S1 TLIF, L4-S2/AI fusion (10/26)

## 2022-11-04 NOTE — PROGRESS NOTE ADULT - PROBLEM SELECTOR PLAN 5
- holding home amlodipine and Irbesartan/HCTZ combo due to hypotension
Not in exacerbation, not on controller  - PRN nebs
- holding home amlodipine and Irbesartan/HCTZ combo due to hypotension
Not in exacerbation, not on controller  - PRN nebs

## 2022-11-04 NOTE — DISCHARGE NOTE PROVIDER - NSDCMRMEDTOKEN_GEN_ALL_CORE_FT
acetaminophen 500 mg oral tablet: 2 tab(s) orally every 8 hours  Alphagan P 0.1% ophthalmic solution: 1 dose(s) to each affected eye every 8 hours  Aspirin Enteric Coated 81 mg oral delayed release tablet: 1 tab(s) orally once a day  atorvastatin 40 mg oral tablet: 1 tab(s) orally once a day  bisacodyl 5 mg oral delayed release tablet: 1 tab(s) orally every 12 hours, As needed, Constipation  diclofenac 1% topical gel:   dorzolamide 2% ophthalmic solution: 1 dose(s) to each affected eye every 8 hours  enoxaparin: 40 milligram(s) subcutaneous once a day (at bedtime)  ezetimibe 10 mg oral tablet: 1 tab(s) orally once a day  famotidine 20 mg oral tablet: 1 tab(s) orally every 12 hours, As needed, Dyspepsia  insulin lispro 100 units/mL injectable solution: Insulin sliding scale: 4 times a day (before meals and at bedtime)  latanoprost 0.005% ophthalmic solution: 1 dose(s) to each affected eye every 8 hours  methocarbamol 500 mg oral tablet: 1 tab(s) orally every 8 hours, As needed, Muscle Spasm  pregabalin 50 mg oral capsule: 1 cap(s) orally 3 times a day  senna leaf extract oral tablet: 2 tab(s) orally once a day (at bedtime)  simethicone 80 mg oral tablet, chewable: 1 tab(s) orally every 8 hours, As needed, Gas  solifenacin 5 mg oral tablet: 1 tab(s) orally once a day  traMADol 50 mg oral tablet: 0.5 tab(s) orally every 6 hours, As needed, Moderate Pain (4 - 6)  traMADol 50 mg oral tablet: 1 tab(s) orally every 6 hours, As needed, Severe Pain (7 - 10)  Vitamin D3 50 mcg (2000 intl units) oral tablet: 1 tab(s) orally once a day

## 2022-11-04 NOTE — PROCEDURE NOTE - GENERAL PROCEDURE DETAILS
hemovac taken off suction, dressing removed, site cleaned thoroughly with chlorhexidine, suture removed, drain removed, steri-strips applied, no drainage seen

## 2022-11-04 NOTE — DISCHARGE NOTE NURSING/CASE MANAGEMENT/SOCIAL WORK - NSDCPEFALRISK_GEN_ALL_CORE
For information on Fall & Injury Prevention, visit: [email or web address] OR  [email or web address] OR  https://www.cdc.gov/steadi/patient.html

## 2022-11-04 NOTE — DISCHARGE NOTE PROVIDER - HOSPITAL COURSE
HPI: 62y F with pmh polio with chronic b/l lower extremity weakness, DM, asthma, and HLD now s/p L5-S1 orellana laminectomy, L5-S1 TLIF, L4-S2/AI fusion (10/26).  Hospital Course:  10/26: POD 0 L4-Pelvis fusion. HMV x 2, ANA LUISA x 1  10/27: POD 1 lumbar fusion. JAMAL o/n. Given 1 U prbc for Hgb 6.4. Hemoglobin 8.5 post transfusion. Jason d/c'ed this morning, passed TOV. Pending standing x-rays   10/28: POD 2 L4-S1 fusion. Drains remain, Pending XR. Recieved 1 U prbc for Hgb 7.7. Responded well Hgb increased to 8.8. Hemodynamically stable.   10/29: POD 3 L4-S1 fusion. HMV/ANA LUISA in place. Pending AR. elevated FS, 10u regular insulin.  10/30: POD 4 L4-S1 fusion. HMV/ANA LUISA in place. Dulcolax suppository ordered for am, manually disimpacted in by  yesterday.   10/31: POD5. JAMAL overnight. SBP 85 this morning 500c bolus given  11/1: POD6. given additional 50mg tramadol for pain, pending post-op lumbar spine CT, had BM yesterday, HMV output 100/120. Pending AR  11/2: POD7. insulin held as per overnight hospitalist Dr. Aguilera. Will monitor sliding coverage x24hrs to determine lispro/lantus needs. Given 500 cc bolus for BP 90s  11/3: POD 8. JAMAL o/n. Received 1 L bolus for hypotension.   11/4: POD 9, JAMAL, HMV removed. going to rehab today.     Patient evaluated by PT/OT who recommended: acute rehab  Patient is going to rehab [facility] course c/b: dizziness, orthostatics negative multiple times, vital signs stable and encouraged to drink PO fluids and salt intake.     Exam on day of discharge:  General: NAD, pt is comfortably sitting up in bed, on room air  HEENT: EOMI b/l, face symmetric, tongue midline, neck FROM  Cardiovascular: Regular rate and rhythm  Respiratory: nonlabored breathing, normal chest rise  GI: abdomen soft, nontender, nondistended  Neuro: CN II-XII grossly intact, PERRL 3mm briskly reactive   A&Ox3, No aphasia, speech clear, no dysmetria, no pronator drift. Follows commands.  MCCLENDON x4 spontaneously, UE 5/5, b/l LE 2/5 HF, 3/5 KF/KE, DF/PF/EHL 5/5.   Extremities: warm and well perfused.   Wound/incision: Midline lumbar incision with sutures and prineo in place     Checklist:   - Obtained follow up appointment from NP   - Reviewed final recommendations of inpatient consults - none  - Neurologically stable for discharge- yes   - Vitals stable for discharge - yes   - Afebrile for discharge - yes   - WBC is stable - 6   - Sodium level is normal - 137  - Pain is adequately controlled - yes   - Pt has PICC/walker/brace/collar

## 2022-11-04 NOTE — PROGRESS NOTE ADULT - PROBLEM SELECTOR PROBLEM 8
Poliomyelitis
Poliomyelitis
Prophylactic measure
Poliomyelitis
Prophylactic measure
Poliomyelitis
Poliomyelitis

## 2022-11-04 NOTE — DISCHARGE NOTE PROVIDER - NSDCFUADDAPPT_GEN_ALL_CORE_FT
Please confirm follow up appointment with Dr. Manzanares     Please follow up with your primary care provider.

## 2022-11-04 NOTE — DISCHARGE NOTE NURSING/CASE MANAGEMENT/SOCIAL WORK - PATIENT PORTAL LINK FT
You can access the FollowMyHealth Patient Portal offered by [facility] by registering at the following website: [email or web address]. By joining [facility]’s FollowMyHealth portal, you will also be able to view your health information using other applications (apps) compatible with our system.

## 2022-11-04 NOTE — PROGRESS NOTE ADULT - PROBLEM SELECTOR PROBLEM 1
Lumbar spondylosis

## 2022-11-04 NOTE — DISCHARGE NOTE PROVIDER - NSDCFUADDINST_GEN_ALL_CORE_FT
Neurosurgery follow up appointment date/time:  - You have sutures in place. They can be removed at rehab on 11/11  - please call the office to confirm appointment: [phone]    Wound Care:  - You can shower as usual. Pat incision dry with clean towel. Do not apply lotions or ointments to incision and leave open to air   - No bathing or swimming.         Activity:  - fatigue is common after surgery, rest if you feel tired   - no bending, lifting, twisting or heavy lifting   - walking is recommended, ambulate as tolerated  - you may shower when you get home, keep your incision dry  - no bathing   - no driving within 24 hours of anesthesia or while taking prescription pain medications   - keep hydrated, drink plenty of water       Please also follow up with your primary care doctor.     Pain Expectations:  - pain after surgery is expected  - please take pain meds as prescribed   - tylenol 1-2 tabs every 6 hours as needed for mild pain   - tramadol 25mg every 6 hours as needed for moderate pain   - tramadol 50mg every 6 hours as needed for severe pain   - robaxin 500mg every 8 hours for muscle spasm as needed  - lyrica 50mg every 8 hours    Medications:  - HOLD home blood pressure medications (amlodipine 5mg and Irbesartan-Hydrochlorothiazide 300-12.5mg as blood pressures have been systolic low 100s)  - Lovenox 40mg subcutaneous daily at bedtime  - Aspirin 81mg daily   - Insulin sliding scale  - atorvastatin 40mg daily   - Oxybutynin 5mg every 12 hours   - Alphagan 1 drop to both eyes every 8 hours   - Xalatan 1 drop to both eyes at bedtime.   - Trusopt 1 drop to both eyes every 8 hours   - Senna 2 tabs at bedtime   - adverse affects of meds discussed with patients  - pain medications can cause constipation, you should eat a high fiber diet and may take a stool softener while on pain meds   - Avoid taking Advil (ibuprofen), Motrin (naproxen), or Aspirin for pain as they can cause bleeding     Call the office or come to ED if:  - wound has drainage or bleeding, increased redness or pain at incision site, neurological change, fever (>101), chills, night sweats, syncope, nausea/vomiting      Playback:  -discharge instruction on playback    WITHIN 24 HOURS OF DISCHARGE, PLEASE CONTACT NEURO PA  WITH ANY QUESTIONS OR CONCERNS: [phone]   OTHERWISE, PLEASE CALL THE OFFICE WITH ANY QUESTIONS OR CONCERNS: [phone]

## 2022-11-04 NOTE — PROGRESS NOTE ADULT - PROBLEM SELECTOR PLAN 8
Baseline LE weakness
DVT ppx: held due to bleed, SCDs  Dispo: AR
Baseline LE weakness
DVT ppx: held due to bleed, SCDs  Dispo: AR
Baseline LE weakness

## 2022-11-04 NOTE — DISCHARGE NOTE PROVIDER - CARE PROVIDER_API CALL
Jhon Manzanares; MS)  Neurosurgery  [address] [place] [address]  Phone: [phone]  Fax: [phone]  Follow Up Time:

## 2022-11-04 NOTE — PROGRESS NOTE ADULT - PROVIDER SPECIALTY LIST ADULT
Neurosurgery
Hospitalist
Neurosurgery
Rehab Medicine
Hospitalist

## 2022-11-04 NOTE — PROGRESS NOTE ADULT - PROBLEM SELECTOR PLAN 9
DVT ppx: Lovenox, SCDs  Dispo: AR, recommend regionalization
DVT ppx: Lovenox, SCDs  Dispo: AR, recommend regionalization
DVT ppx: held due to bleed, SCDs  Dispo: AR
DVT ppx: Lovenox, SCDs  Dispo: AR
DVT ppx: Lovenox, SCDs  Dispo: AR, agree with regionalization

## 2022-11-04 NOTE — DISCHARGE NOTE PROVIDER - NSDCCPCAREPLAN_GEN_ALL_CORE_FT
PRINCIPAL DISCHARGE DIAGNOSIS  Diagnosis: Lumbar spondylosis  Assessment and Plan of Treatment: L5      SECONDARY DISCHARGE DIAGNOSES  Diagnosis: DM (diabetes mellitus)  Assessment and Plan of Treatment:     Diagnosis: Asthma  Assessment and Plan of Treatment:     Diagnosis: Hypercholesterolemia  Assessment and Plan of Treatment:     Diagnosis: HTN (hypertension)  Assessment and Plan of Treatment:     Diagnosis: Poliomyelitis  Assessment and Plan of Treatment:

## 2022-11-04 NOTE — PROGRESS NOTE ADULT - SUBJECTIVE AND OBJECTIVE BOX
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 She has a longstanding history of right lower extremity weakness due to childhood illness which is suspicious for polio. She recently has had severe back and leg pain as well as many recent falls. 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 Pt is here for an elective surgery by Dr Manzanares.       (28 Oct 2022 09:05)    10/26: POD 0 L4-Pelvis fusion. HMV x 2, ANA LUISA x 1  10/27: POD 1 lumbar fusion. JAMAL o/n. Given 1 U prbc for Hgb 6.4. Hemoglobin 8.5 post transfusion. Jason d/c'ed this morning, passed TOV. Pending standing x-rays   10/28: POD 2 L4-S1 fusion. Drains remain, Pending XR. Recieved 1 U prbc for Hgb 7.7. Responded well Hgb increased to 8.8. Hemodynamically stable.   10/29: POD 3 L4-S1 fusion. HMV/ANA LUISA in place. Pending AR. elevated FS, 10u regular insulin.  10/30: POD 4 L4-S1 fusion. HMV/ANA LUISA in place. Dulcolax suppository ordered for am, manually disimpacted in by  yesterday.   10/31: POD5. JAMAL overnight. SBP 85 this morning 500c bolus given  11/1: POD6. given additional 50mg tramadol for pain, pending post-op lumbar spine CT, had BM yesterday, HMV output 100/120. Pending AR  11/2: POD7. insulin held as per overnight hospitalist Dr. Aguilera. Will monitor sliding coverage x24hrs to determine lispro/lantus needs. Given 500 cc bolus for BP 90s  11/3: POD 8. JAMAL o/n. Received 1 L bolus for hypotension.   11/4: POD 9, JAMAL    OVERNIGHT EVENTS: no acute events   Vital Signs Last 24 Hrs  T(C): 37.1 (03 Nov 2022 20:26), Max: 37.9 (03 Nov 2022 16:34)  T(F): 98.7 (03 Nov 2022 20:26), Max: 100.3 (03 Nov 2022 16:34)  HR: 71 (03 Nov 2022 20:26) (65 - 78)  BP: 93/62 (03 Nov 2022 20:26) (93/62 - 113/77)  BP(mean): 73 (03 Nov 2022 08:38) (73 - 73)  RR: 17 (03 Nov 2022 20:26) (17 - 18)  SpO2: 95% (03 Nov 2022 20:26) (95% - 98%)    Parameters below as of 03 Nov 2022 20:26  Patient On (Oxygen Delivery Method): room air        I&O's Summary    02 Nov 2022 07:01  -  03 Nov 2022 07:00  --------------------------------------------------------  IN: 0 mL / OUT: 1245 mL / NET: -1245 mL    03 Nov 2022 07:01  -  04 Nov 2022 04:02  --------------------------------------------------------  IN: 0 mL / OUT: 1103 mL / NET: -1103 mL    PHYSICAL EXAM:  General: NAD, pt is comfortably sitting up in bed, on room air  HEENT: EOMI b/l, face symmetric, tongue midline, neck FROM  Cardiovascular: Regular rate and rhythm  Respiratory: nonlabored breathing, normal chest rise  GI: abdomen soft, nontender, nondistended  Neuro: CN II-XII grossly intact, PERRL 3mm briskly reactive   A&Ox3, No aphasia, speech clear, no dysmetria, no pronator drift. Follows commands.  MCCLENDON x4 spontaneously, UE 5/5, b/l LE 2/5 HF, 3/5 KF/KE, DF/PF/EHL 5/5.  Extremities: distal pulses 2+     LABS:                        10.4   8.26  )-----------( 321      ( 03 Nov 2022 14:40 )             32.2     11-03    141  |  104  |  11  ----------------------------<  169<H>  4.2   |  27  |  0.32<L>    Ca    9.1      03 Nov 2022 14:40  Phos  5.0     11-03  Mg     1.8     11-03    TPro  x   /  Alb  x   /  TBili  0.3  /  DBili  x   /  AST  x   /  ALT  x   /  AlkPhos  x   11-02    PT/INR - ( 02 Nov 2022 07:04 )   PT: 11.1 sec;   INR: 0.93                  CAPILLARY BLOOD GLUCOSE      POCT Blood Glucose.: 182 mg/dL (03 Nov 2022 21:50)  POCT Blood Glucose.: 209 mg/dL (03 Nov 2022 17:42)  POCT Blood Glucose.: 133 mg/dL (03 Nov 2022 11:50)  POCT Blood Glucose.: 128 mg/dL (03 Nov 2022 07:32)      Allergies    No Known Allergies    Intolerances      MEDICATIONS:  Antibiotics:    Neuro:  acetaminophen     Tablet .. 1000 milliGRAM(s) Oral every 8 hours  methocarbamol 500 milliGRAM(s) Oral every 8 hours PRN  ondansetron   Disintegrating Tablet 4 milliGRAM(s) Oral every 6 hours  pregabalin 50 milliGRAM(s) Oral three times a day  traMADol 25 milliGRAM(s) Oral every 6 hours PRN  traMADol 50 milliGRAM(s) Oral every 6 hours PRN    Anticoagulation:  enoxaparin Injectable 40 milliGRAM(s) SubCutaneous <User Schedule>    OTHER:  atorvastatin 40 milliGRAM(s) Oral at bedtime  bisacodyl 5 milliGRAM(s) Oral every 12 hours PRN  brimonidine 0.2% Ophthalmic Solution 1 Drop(s) Both EYES every 8 hours  dorzolamide 2% Ophthalmic Solution 1 Drop(s) Both EYES every 8 hours  famotidine    Tablet 20 milliGRAM(s) Oral every 12 hours PRN  insulin lispro (ADMELOG) corrective regimen sliding scale   SubCutaneous Before meals and at bedtime  latanoprost 0.005% Ophthalmic Solution 1 Drop(s) Both EYES at bedtime  oxybutynin 5 milliGRAM(s) Oral two times a day  senna 2 Tablet(s) Oral at bedtime  simethicone 80 milliGRAM(s) Chew every 8 hours PRN    IVF:  sodium chloride 0.9%. 1000 milliLiter(s) IV Continuous <Continuous>    CULTURES:    62y F with pmh polio with chronic b/l lower extremity weakness, DM, asthma, and HLD now s/p L5-S1 orellana laminectomy, L5-S1 TLIF, L4-S2/AI fusion (10/26).    Plan:  Neuro:  - neuro checks/vital signs q4hrs   - pain control tylenol/tramadol   - postop xrays complete  - decadron 4q6 x 3 days completed  - monitor HMV x1    Cardiac:  - SBP goal 100-160  - Hold home BP meds: HCTZ, amlodipine, losartan    Pulmonary:  - on RA, IS    GI:  - carb consistent diet   - bowel regimen  - manually disimpacted 10/29    Renal/:  - IVL, voiding  - cont home oxybutynin 5mg BID    Heme:  - SCDs, SQL for DVT prophylaxis  - s/p 1 U PRBC 10/27, 10/28, H/H stable     Endo:  - ISS, A1C 6.3  - Lantus/lispro discontinued for low BG. Continue ISS   - cont home lipitor 40mg     ID:  - afebrile, no leukocytosis    Disposition: SDU status, full code, PT/OT recc AR     Assessment and Plan d/w Dr. Manzanares

## 2022-11-04 NOTE — PROGRESS NOTE ADULT - PROBLEM SELECTOR PLAN 2
Hgb drop to 6.4 from baseline 8.2  - s/p 1upRBC 10/28. Hb stable this AM   -trend Hgb  -keep active Type and Screen  -keep 2 large bore IVs
Resolved s/p BM, had BM again 10/31 and continues to have no pain  - Abdominal XR without obstruction
Hgb drop to 6.4 from baseline 8.2  - s/p 1 unit PRBC and return to 8.5 and now drop to 7.7.  Getting another unit of blood 10/28  -trend Hgb  -keep active Type and Screen  -keep 2 large bore IVs
Resolved  - repeat orthostatics 11/2 negative, encourage PO salt and fluid intake
Patient reports dizziness with activity  - will obtain repeat orthostatic today and if positive fluid bolus and consider salt tabs  - patient with generalized fatigue and orthostatic hypotension, recent decadron.  can send AM cortisol to r/o adrenal insufficiency.  Would send this tomorrow AM prior to considering starting fludrocortisone
Resolved  - repeat orthostatics 11/2 negative, encourage PO salt and fluid intake
Was constipated yesterday and had a BM this AM   - Now has diffuse abdominal pain with no rebound tenderness or focal findings. She is passing gas.   - Order Abdominal XR

## 2022-11-04 NOTE — PROGRESS NOTE ADULT - PROBLEM SELECTOR PROBLEM 6
HTN (hypertension)
Hypercholesterolemia
Hypercholesterolemia
HTN (hypertension)

## 2022-11-04 NOTE — PROGRESS NOTE ADULT - PROBLEM SELECTOR PLAN 4
A1c 6.3 on Metformin and Januvia at home  -currently exacerbated by decadron, now at goal  -Continue Lantus 12 units and lispro premeal 10 units TID  -MISS
Not in exacerbation, not on controller  - PRN nebs
A1c 6.3 on Metformin and Januvia at home  -was exacerbated by decadron, has been off since 10/29 and was stable on current regimen  -Stop Lantus and premeal  -MISS only and will adjust based on requirements
A1c 6.3 on Metformin and Januvia at home  -was exacerbated by decadron, has been off since 10/29 and was stable on current regimen  -Stop Lantus and premeal  -MISS only and will adjust based on requirements
Not in exacerbation, not on controller  - PRN nebs
A1c 6.3 on Metformin and Januvia at home  -currently exacerbated by decadron but more controlled after increasing Insulin yesterday   -Continue Lantus 12 units and lispro premeal 10 units TID  -MISS
A1c 6.3 on Metformin and Januvia at home  -was exacerbated by decadron, has been off since 10/29 and was stable on current regimen  -Stop Lantus and premeal  -MISS with meals and at bedtime

## 2022-11-04 NOTE — PROGRESS NOTE ADULT - PROBLEM SELECTOR PLAN 7
-c/w Atorvastatin
Baseline LE weakness
-c/w Atorvastatin
Baseline LE weakness
-c/w Atorvastatin

## 2022-11-04 NOTE — PROGRESS NOTE ADULT - PROBLEM SELECTOR PLAN 1
-s/p L4-S2/AI fusion (10/26)  -pain control per neurosurgery team  -Steroids and drain management per primary team  -PT recs AR, referral sent
- s/p L4-S2/AI fusion (10/26)  - pain control per neurosurgery team  - drain management per primary team  - now off Steroids  - repeat post op CT scans performed  - pain control per primary team  - PT recs AR
- s/p L4-S2/AI fusion (10/26)  - pain control per neurosurgery team  - drain management per primary team  - now off Steroids  - repeat post op CT scans performed  - pain control per primary team  - PT recs AR
-s/p L4-S2/AI fusion (10/26)  -pain control per neurosurgery team  -Steroids and drain management per primary team  -f/u PT recs AR, referral sent
-s/p L4-S2/AI fusion (10/26)  -pain control per neurosurgery team  -Steroids and drain management per primary team  -PT recs AR, referral sent
- s/p L4-S2/AI fusion (10/26)  - pain control per neurosurgery team  - Steroids and drain management per primary team  - repeat post op CT scans to be performed today   - pain control per primary team  - PT recs AR
- s/p L4-S2/AI fusion (10/26)  - pain control per neurosurgery team  - drain management per primary team  - now off Steroids  - repeat post op CT scans to be performed today   - pain control per primary team  - PT recs AR

## 2022-11-04 NOTE — PROGRESS NOTE ADULT - PROBLEM SELECTOR PLAN 3
Initial Hgb drop to 6.4 from baseline 8 - 9.  Resolved  - s/p pRBCs  - has remained stable ~9
Initial Hgb drop to 6.4 from baseline 8.2.  Resolved  - s/p pRBCs  - trend Hgb  - keep active Type and Screen  - keep 2 large bore IVs
A1c 6.3 on Metformin and Januvia at home  -currently exacerbated by decadron  -increase Lantus to 12 units and lispro premeal to 10 units. Will likely need more given her requirements in last 24h  -MISS
Hgb drop to 6.4 from baseline 8.2  - s/p 1upRBC 10/28. Hb has remained stable  -trend Hgb  -keep active Type and Screen  -keep 2 large bore IVs
Initial Hgb drop to 6.4 from baseline 8.2.  Resolved  - s/p pRBCs  - trend Hgb  - keep active Type and Screen  - keep 2 large bore IVs
A1c 6.3 on Metformin and Januvia at home  -currently exacerbated by decadron  -increase Lantus to 8 units and lispro premeal to 5 units  -MISS
Initial Hgb drop to 6.4 from baseline 8.2.  Resolved  - s/p 1upRBC 10/28. Hb has remained stable 9-10  - trend Hgb  - keep active Type and Screen  - keep 2 large bore IVs

## 2022-11-04 NOTE — DISCHARGE NOTE PROVIDER - NSDCCPTREATMENT_GEN_ALL_CORE_FT
PRINCIPAL PROCEDURE  Procedure: Fusion, spine, lumbar, interbody, 1-2 levels, transforaminal approach  Findings and Treatment:       SECONDARY PROCEDURE  Procedure: Fusion, spine, lumbar, interbody, 1-2 levels, transforaminal approach  Findings and Treatment:

## 2022-11-04 NOTE — PROGRESS NOTE ADULT - PROBLEM SELECTOR PROBLEM 3
[de-identified] : Ingris and I discussed her pain.  She has a nondisplaced fracture at the base of the 5th metatarsal and an ankle sprain.  I have provided her with a CAM boot.  Follow up in 1 month time for repeat imaging.  She can restart PT at that time.\par \par Margoth Zheng MD, EdM\par Sports Medicine [facility]\par Department of Orthopedics\par 
Acute blood loss anemia
DM (diabetes mellitus)
Acute blood loss anemia
Acute blood loss anemia
DM (diabetes mellitus)
Acute blood loss anemia
Acute blood loss anemia

## 2022-11-04 NOTE — PROGRESS NOTE ADULT - PROBLEM SELECTOR PROBLEM 4
DM (diabetes mellitus)
DM (diabetes mellitus)
Asthma
DM (diabetes mellitus)
Asthma
DM (diabetes mellitus)
DM (diabetes mellitus)

## 2022-11-10 DIAGNOSIS — T38.0X5A ADVERSE EFFECT OF GLUCOCORTICOIDS AND SYNTHETIC ANALOGUES, INITIAL ENCOUNTER: ICD-10-CM

## 2022-11-10 DIAGNOSIS — E11.9 TYPE 2 DIABETES MELLITUS WITHOUT COMPLICATIONS: ICD-10-CM

## 2022-11-10 DIAGNOSIS — I95.1 ORTHOSTATIC HYPOTENSION: ICD-10-CM

## 2022-11-10 DIAGNOSIS — G14 POSTPOLIO SYNDROME: ICD-10-CM

## 2022-11-10 DIAGNOSIS — Z79.84 LONG TERM (CURRENT) USE OF ORAL HYPOGLYCEMIC DRUGS: ICD-10-CM

## 2022-11-10 DIAGNOSIS — M47.26 OTHER SPONDYLOSIS WITH RADICULOPATHY, LUMBAR REGION: ICD-10-CM

## 2022-11-10 DIAGNOSIS — R29.6 REPEATED FALLS: ICD-10-CM

## 2022-11-10 DIAGNOSIS — D62 ACUTE POSTHEMORRHAGIC ANEMIA: ICD-10-CM

## 2022-11-10 DIAGNOSIS — E78.00 PURE HYPERCHOLESTEROLEMIA, UNSPECIFIED: ICD-10-CM

## 2022-11-10 DIAGNOSIS — I10 ESSENTIAL (PRIMARY) HYPERTENSION: ICD-10-CM

## 2022-11-10 DIAGNOSIS — M43.17 SPONDYLOLISTHESIS, LUMBOSACRAL REGION: ICD-10-CM

## 2022-11-10 DIAGNOSIS — Z86.16 PERSONAL HISTORY OF COVID-19: ICD-10-CM

## 2022-11-15 DIAGNOSIS — G43.909 MIGRAINE, UNSPECIFIED, NOT INTRACTABLE, W/OUT STATUS MIGRAINOSUS: ICD-10-CM

## 2022-11-15 RX ORDER — BUTALBITAL, ACETAMINOPHEN AND CAFFEINE 300; 50; 40 MG/1; MG/1; MG/1
50-300-40 CAPSULE ORAL
Qty: 120 | Refills: 0 | Status: ACTIVE | COMMUNITY
Start: 2022-11-15 | End: 1900-01-01

## 2022-11-19 DIAGNOSIS — G89.18 OTHER ACUTE POSTPROCEDURAL PAIN: ICD-10-CM

## 2022-11-19 DIAGNOSIS — G62.9 POLYNEUROPATHY, UNSPECIFIED: ICD-10-CM

## 2022-11-19 RX ORDER — GABAPENTIN 300 MG/1
300 CAPSULE ORAL
Qty: 90 | Refills: 0 | Status: ACTIVE | COMMUNITY
Start: 2022-11-19 | End: 1900-01-01

## 2022-11-19 RX ORDER — OXYCODONE AND ACETAMINOPHEN 5; 325 MG/1; MG/1
5-325 TABLET ORAL
Qty: 60 | Refills: 0 | Status: ACTIVE | COMMUNITY
Start: 2022-11-19 | End: 1900-01-01

## 2022-11-19 RX ORDER — OXYCODONE HYDROCHLORIDE 15 MG/1
15 TABLET, FILM COATED, EXTENDED RELEASE ORAL
Qty: 30 | Refills: 0 | Status: ACTIVE | COMMUNITY
Start: 2022-11-19 | End: 1900-01-01

## 2022-11-19 RX ORDER — GABAPENTIN 300 MG/1
300 CAPSULE ORAL 3 TIMES DAILY
Qty: 90 | Refills: 0 | Status: ACTIVE | COMMUNITY
Start: 2022-11-19 | End: 1900-01-01

## 2022-11-19 RX ORDER — OXYCODONE AND ACETAMINOPHEN 5; 325 MG/1; MG/1
5-325 TABLET ORAL
Qty: 45 | Refills: 0 | Status: ACTIVE | COMMUNITY
Start: 2022-11-19 | End: 1900-01-01

## 2022-11-22 DIAGNOSIS — D53.9 NUTRITIONAL ANEMIA, UNSPECIFIED: ICD-10-CM

## 2022-11-25 RX ORDER — OXYCODONE AND ACETAMINOPHEN 5; 325 MG/1; MG/1
5-325 TABLET ORAL
Qty: 60 | Refills: 0 | Status: ACTIVE | COMMUNITY
Start: 2022-11-25 | End: 1900-01-01

## 2022-11-30 PROBLEM — E78.00 PURE HYPERCHOLESTEROLEMIA, UNSPECIFIED: Chronic | Status: ACTIVE | Noted: 2022-10-25

## 2022-11-30 PROBLEM — A80.9 ACUTE POLIOMYELITIS, UNSPECIFIED: Chronic | Status: ACTIVE | Noted: 2022-10-25

## 2022-11-30 PROBLEM — J45.909 UNSPECIFIED ASTHMA, UNCOMPLICATED: Chronic | Status: ACTIVE | Noted: 2022-10-25

## 2022-11-30 PROBLEM — Z87.19 PERSONAL HISTORY OF OTHER DISEASES OF THE DIGESTIVE SYSTEM: Chronic | Status: ACTIVE | Noted: 2022-10-25

## 2022-11-30 PROBLEM — I10 ESSENTIAL (PRIMARY) HYPERTENSION: Chronic | Status: ACTIVE | Noted: 2022-10-25

## 2022-11-30 PROBLEM — M43.16 SPONDYLOLISTHESIS, LUMBAR REGION: Chronic | Status: ACTIVE | Noted: 2022-10-25

## 2022-11-30 PROBLEM — Z87.898 PERSONAL HISTORY OF OTHER SPECIFIED CONDITIONS: Chronic | Status: ACTIVE | Noted: 2022-10-25

## 2022-11-30 PROBLEM — K63.5 POLYP OF COLON: Chronic | Status: ACTIVE | Noted: 2022-10-25

## 2022-11-30 PROBLEM — M47.816 SPONDYLOSIS WITHOUT MYELOPATHY OR RADICULOPATHY, LUMBAR REGION: Chronic | Status: ACTIVE | Noted: 2022-10-25

## 2022-11-30 PROBLEM — M19.90 UNSPECIFIED OSTEOARTHRITIS, UNSPECIFIED SITE: Chronic | Status: ACTIVE | Noted: 2022-10-25

## 2022-11-30 PROBLEM — E11.9 TYPE 2 DIABETES MELLITUS WITHOUT COMPLICATIONS: Chronic | Status: ACTIVE | Noted: 2022-10-25

## 2022-11-30 PROBLEM — U07.1 COVID-19: Chronic | Status: ACTIVE | Noted: 2022-10-25

## 2022-11-30 PROBLEM — S82.009A UNSPECIFIED FRACTURE OF UNSPECIFIED PATELLA, INITIAL ENCOUNTER FOR CLOSED FRACTURE: Chronic | Status: ACTIVE | Noted: 2022-10-25

## 2022-11-30 PROBLEM — M65.30 TRIGGER FINGER, UNSPECIFIED FINGER: Chronic | Status: ACTIVE | Noted: 2022-10-25

## 2022-12-01 DIAGNOSIS — S32.009K UNSPECIFIED FRACTURE OF UNSPECIFIED LUMBAR VERTEBRA, SUBSEQUENT ENCOUNTER FOR FRACTURE WITH NONUNION: ICD-10-CM

## 2022-12-02 ENCOUNTER — NON-APPOINTMENT (OUTPATIENT)
Age: 62
End: 2022-12-02

## 2022-12-02 ENCOUNTER — RESULT REVIEW (OUTPATIENT)
Age: 62
End: 2022-12-02

## 2022-12-02 DIAGNOSIS — M51.36 OTHER INTERVERTEBRAL DISC DEGENERATION, LUMBAR REGION: ICD-10-CM

## 2022-12-02 DIAGNOSIS — G95.20 UNSPECIFIED CORD COMPRESSION: ICD-10-CM

## 2022-12-02 DIAGNOSIS — M21.379 FOOT DROP, UNSPECIFIED FOOT: ICD-10-CM

## 2022-12-02 DIAGNOSIS — M43.16 OTHER INTERVERTEBRAL DISC DEGENERATION, LUMBAR REGION: ICD-10-CM

## 2022-12-02 NOTE — HISTORY OF PRESENT ILLNESS
[FreeTextEntry1] : 61 yo F with PMH of HTN, DM, polio (chronic L leg atrophy), HLD initially reported progressively worsening back to b/l LE pain and sudden onset of urinary frequency/incontinence in 3/2022 after mechanical fall at that time.\par Images revealed severe lumbar stenosis and grade II spondylolisthesis. She underwent elective lumbar decompression and fusion.\par Post operative showed low hemoglobin, and had 2 units of blood transfusion. \par She was discharged to [facility] ([facility]) on 11/4/2022.\par \par Today she reports severe new onset of R foot pain (lateral ankle) with muscle spasm on R toe worsening at night time and noticed sudden onset of R foot weakness for the past 2 weeks without significant injury. She has been taking Percocet/Lyrica/Valium since hospital discharge without relief.

## 2022-12-02 NOTE — DATA REVIEWED
[de-identified] : L-spine AP/Lat today in PACS showed stable interbody/posterior fusion, unremarkable

## 2022-12-02 NOTE — REASON FOR VISIT
[de-identified] : L5 alek laminectomy, L5-S1 transforaminal lumbar interbody fusion, L4-S1 posterior fusion [de-identified] : 10/26/2022 [de-identified] : 5

## 2022-12-02 NOTE — PHYSICAL EXAM
[General Appearance - Alert] : alert [General Appearance - In No Acute Distress] : in no acute distress [General Appearance - Well Nourished] : well nourished [General Appearance - Well-Appearing] : healthy appearing [] : normal voice and communication [Longitudinal] : longitudinal [Healing Well] : healing well [No Drainage] : without drainage [Normal Skin] : normal [Oriented To Time, Place, And Person] : oriented to person, place, and time [Impaired Insight] : insight and judgment were intact [Memory Recent] : recent memory was not impaired [Person] : oriented to person [Place] : oriented to place [Time] : oriented to time [4] : L3 quadriceps 4/5 [Limited Balance] : the patient's balance was impaired [1+] : Brachioradialis right 1+ [2+] : Brachioradialis left 2+ [0] : Ankle jerk right 0 [3+] : Ankle jerk left 3+ [FreeTextEntry1] : lower back

## 2022-12-04 RX ORDER — OXYCODONE AND ACETAMINOPHEN 5; 325 MG/1; MG/1
5-325 TABLET ORAL
Qty: 60 | Refills: 0 | Status: ACTIVE | COMMUNITY
Start: 2022-12-04 | End: 1900-01-01

## 2022-12-05 RX ORDER — GABAPENTIN 300 MG/1
300 CAPSULE ORAL 3 TIMES DAILY
Qty: 180 | Refills: 0 | Status: ACTIVE | COMMUNITY
Start: 2022-12-05 | End: 1900-01-01

## 2022-12-06 ENCOUNTER — OUTPATIENT (OUTPATIENT)
Dept: OUTPATIENT SERVICES | Facility: HOSPITAL | Age: 62
LOS: 1 days | End: 2022-12-06
Payer: MEDICARE

## 2022-12-06 ENCOUNTER — APPOINTMENT (OUTPATIENT)
Dept: MRI IMAGING | Facility: HOSPITAL | Age: 62
End: 2022-12-06

## 2022-12-06 DIAGNOSIS — Z98.890 OTHER SPECIFIED POSTPROCEDURAL STATES: Chronic | ICD-10-CM

## 2022-12-06 PROCEDURE — A9585: CPT

## 2022-12-06 PROCEDURE — 72158 MRI LUMBAR SPINE W/O & W/DYE: CPT | Mod: 26

## 2022-12-06 PROCEDURE — 72158 MRI LUMBAR SPINE W/O & W/DYE: CPT

## 2022-12-06 PROCEDURE — 72100 X-RAY EXAM L-S SPINE 2/3 VWS: CPT | Mod: 26

## 2022-12-06 PROCEDURE — 72100 X-RAY EXAM L-S SPINE 2/3 VWS: CPT

## 2022-12-07 ENCOUNTER — INPATIENT (INPATIENT)
Facility: HOSPITAL | Age: 62
LOS: 8 days | Discharge: HOME | DRG: 983 | End: 2022-12-16
Attending: NEUROLOGICAL SURGERY | Admitting: NEUROLOGICAL SURGERY
Payer: MEDICARE

## 2022-12-07 VITALS
SYSTOLIC BLOOD PRESSURE: 115 MMHG | HEART RATE: 78 BPM | OXYGEN SATURATION: 97 % | RESPIRATION RATE: 16 BRPM | DIASTOLIC BLOOD PRESSURE: 74 MMHG | TEMPERATURE: 98 F

## 2022-12-07 DIAGNOSIS — E78.5 HYPERLIPIDEMIA, UNSPECIFIED: ICD-10-CM

## 2022-12-07 DIAGNOSIS — Z79.82 LONG TERM (CURRENT) USE OF ASPIRIN: ICD-10-CM

## 2022-12-07 DIAGNOSIS — Z79.4 LONG TERM (CURRENT) USE OF INSULIN: ICD-10-CM

## 2022-12-07 DIAGNOSIS — Z98.1 ARTHRODESIS STATUS: ICD-10-CM

## 2022-12-07 DIAGNOSIS — Z98.890 OTHER SPECIFIED POSTPROCEDURAL STATES: Chronic | ICD-10-CM

## 2022-12-07 DIAGNOSIS — Z20.822 CONTACT WITH AND (SUSPECTED) EXPOSURE TO COVID-19: ICD-10-CM

## 2022-12-07 DIAGNOSIS — G14 POSTPOLIO SYNDROME: ICD-10-CM

## 2022-12-07 DIAGNOSIS — D64.9 ANEMIA, UNSPECIFIED: ICD-10-CM

## 2022-12-07 DIAGNOSIS — M48.07 SPINAL STENOSIS, LUMBOSACRAL REGION: ICD-10-CM

## 2022-12-07 DIAGNOSIS — Z86.16 PERSONAL HISTORY OF COVID-19: ICD-10-CM

## 2022-12-07 DIAGNOSIS — J45.909 UNSPECIFIED ASTHMA, UNCOMPLICATED: ICD-10-CM
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
## 2022-12-07 DIAGNOSIS — E11.9 TYPE 2 DIABETES MELLITUS WITHOUT COMPLICATIONS: ICD-10-CM

## 2022-12-07 DIAGNOSIS — M19.90 UNSPECIFIED OSTEOARTHRITIS, UNSPECIFIED SITE: ICD-10-CM

## 2022-12-07 DIAGNOSIS — G96.09 OTHER SPINAL CEREBROSPINAL FLUID LEAK: ICD-10-CM

## 2022-12-07 DIAGNOSIS — M47.26 OTHER SPONDYLOSIS WITH RADICULOPATHY, LUMBAR REGION: ICD-10-CM
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
## 2022-12-07 DIAGNOSIS — L76.34 POSTPROCEDURAL SEROMA OF SKIN AND SUBCUTANEOUS TISSUE FOLLOWING OTHER PROCEDURE: ICD-10-CM

## 2022-12-07 DIAGNOSIS — I10 ESSENTIAL (PRIMARY) HYPERTENSION: ICD-10-CM

## 2022-12-07 DIAGNOSIS — E78.00 PURE HYPERCHOLESTEROLEMIA, UNSPECIFIED: ICD-10-CM

## 2022-12-07 DIAGNOSIS — Z79.84 LONG TERM (CURRENT) USE OF ORAL HYPOGLYCEMIC DRUGS: ICD-10-CM

## 2022-12-07 LAB
ANION GAP SERPL CALC-SCNC: 11 MMOL/L — SIGNIFICANT CHANGE UP (ref 5–17)
APTT BLD: 30 SEC — SIGNIFICANT CHANGE UP (ref 27.5–35.5)
BLD GP AB SCN SERPL QL: POSITIVE — SIGNIFICANT CHANGE UP
BUN SERPL-MCNC: 14 MG/DL — SIGNIFICANT CHANGE UP (ref 7–23)
CALCIUM SERPL-MCNC: 9.5 MG/DL — SIGNIFICANT CHANGE UP (ref 8.4–10.5)
CHLORIDE SERPL-SCNC: 101 MMOL/L — SIGNIFICANT CHANGE UP (ref 96–108)
CO2 SERPL-SCNC: 27 MMOL/L — SIGNIFICANT CHANGE UP (ref 22–31)
CREAT SERPL-MCNC: 0.24 MG/DL — LOW (ref 0.5–1.3)
EGFR: 128 ML/MIN/1.73M2 — SIGNIFICANT CHANGE UP
GLUCOSE BLDC GLUCOMTR-MCNC: 120 MG/DL — HIGH (ref 70–99)
GLUCOSE BLDC GLUCOMTR-MCNC: 131 MG/DL — HIGH (ref 70–99)
GLUCOSE SERPL-MCNC: 121 MG/DL — HIGH (ref 70–99)
HCT VFR BLD CALC: 36 % — SIGNIFICANT CHANGE UP (ref 34.5–45)
HGB BLD-MCNC: 11.7 G/DL — SIGNIFICANT CHANGE UP (ref 11.5–15.5)
INR BLD: 0.82 — LOW (ref 0.88–1.16)
MAGNESIUM SERPL-MCNC: 2 MG/DL — SIGNIFICANT CHANGE UP (ref 1.6–2.6)
MCHC RBC-ENTMCNC: 29.6 PG — SIGNIFICANT CHANGE UP (ref 27–34)
MCHC RBC-ENTMCNC: 32.5 GM/DL — SIGNIFICANT CHANGE UP (ref 32–36)
MCV RBC AUTO: 91.1 FL — SIGNIFICANT CHANGE UP (ref 80–100)
NRBC # BLD: 0 /100 WBCS — SIGNIFICANT CHANGE UP (ref 0–0)
PHOSPHATE SERPL-MCNC: 4.4 MG/DL — SIGNIFICANT CHANGE UP (ref 2.5–4.5)
PLATELET # BLD AUTO: 288 K/UL — SIGNIFICANT CHANGE UP (ref 150–400)
POTASSIUM SERPL-MCNC: 4 MMOL/L — SIGNIFICANT CHANGE UP (ref 3.5–5.3)
POTASSIUM SERPL-SCNC: 4 MMOL/L — SIGNIFICANT CHANGE UP (ref 3.5–5.3)
PROTHROM AB SERPL-ACNC: 9.7 SEC — LOW (ref 10.5–13.4)
RBC # BLD: 3.95 M/UL — SIGNIFICANT CHANGE UP (ref 3.8–5.2)
RBC # FLD: 12.8 % — SIGNIFICANT CHANGE UP (ref 10.3–14.5)
RH IG SCN BLD-IMP: POSITIVE — SIGNIFICANT CHANGE UP
SODIUM SERPL-SCNC: 139 MMOL/L — SIGNIFICANT CHANGE UP (ref 135–145)
WBC # BLD: 5.83 K/UL — SIGNIFICANT CHANGE UP (ref 3.8–10.5)
WBC # FLD AUTO: 5.83 K/UL — SIGNIFICANT CHANGE UP (ref 3.8–10.5)

## 2022-12-07 PROCEDURE — 86077 PHYS BLOOD BANK SERV XMATCH: CPT

## 2022-12-07 RX ORDER — LATANOPROST 0.05 MG/ML
1 SOLUTION/ DROPS OPHTHALMIC; TOPICAL AT BEDTIME
Refills: 0 | Status: DISCONTINUED | OUTPATIENT
Start: 2022-12-07 | End: 2022-12-16

## 2022-12-07 RX ORDER — ASPIRIN/CALCIUM CARB/MAGNESIUM 324 MG
1 TABLET ORAL
Qty: 0 | Refills: 0 | DISCHARGE

## 2022-12-07 RX ORDER — DORZOLAMIDE HYDROCHLORIDE 20 MG/ML
1 SOLUTION/ DROPS OPHTHALMIC EVERY 8 HOURS
Refills: 0 | Status: DISCONTINUED | OUTPATIENT
Start: 2022-12-07 | End: 2022-12-16

## 2022-12-07 RX ORDER — OXYCODONE HYDROCHLORIDE 5 MG/1
10 TABLET ORAL EVERY 4 HOURS
Refills: 0 | Status: DISCONTINUED | OUTPATIENT
Start: 2022-12-07 | End: 2022-12-13

## 2022-12-07 RX ORDER — SODIUM CHLORIDE 9 MG/ML
1000 INJECTION, SOLUTION INTRAVENOUS
Refills: 0 | Status: DISCONTINUED | OUTPATIENT
Start: 2022-12-07 | End: 2022-12-08

## 2022-12-07 RX ORDER — METHOCARBAMOL 500 MG/1
500 TABLET, FILM COATED ORAL EVERY 8 HOURS
Refills: 0 | Status: DISCONTINUED | OUTPATIENT
Start: 2022-12-07 | End: 2022-12-09

## 2022-12-07 RX ORDER — DEXTROSE 50 % IN WATER 50 %
25 SYRINGE (ML) INTRAVENOUS ONCE
Refills: 0 | Status: DISCONTINUED | OUTPATIENT
Start: 2022-12-07 | End: 2022-12-08

## 2022-12-07 RX ORDER — ACETAMINOPHEN 500 MG
1000 TABLET ORAL EVERY 8 HOURS
Refills: 0 | Status: DISCONTINUED | OUTPATIENT
Start: 2022-12-07 | End: 2022-12-09

## 2022-12-07 RX ORDER — INSULIN LISPRO 100/ML
VIAL (ML) SUBCUTANEOUS
Refills: 0 | Status: DISCONTINUED | OUTPATIENT
Start: 2022-12-07 | End: 2022-12-16

## 2022-12-07 RX ORDER — FAMOTIDINE 10 MG/ML
20 INJECTION INTRAVENOUS
Refills: 0 | Status: DISCONTINUED | OUTPATIENT
Start: 2022-12-07 | End: 2022-12-16

## 2022-12-07 RX ORDER — OXYCODONE HYDROCHLORIDE 5 MG/1
5 TABLET ORAL EVERY 4 HOURS
Refills: 0 | Status: DISCONTINUED | OUTPATIENT
Start: 2022-12-07 | End: 2022-12-13

## 2022-12-07 RX ORDER — SENNA PLUS 8.6 MG/1
2 TABLET ORAL AT BEDTIME
Refills: 0 | Status: DISCONTINUED | OUTPATIENT
Start: 2022-12-07 | End: 2022-12-13

## 2022-12-07 RX ORDER — GLUCAGON INJECTION, SOLUTION 0.5 MG/.1ML
1 INJECTION, SOLUTION SUBCUTANEOUS ONCE
Refills: 0 | Status: DISCONTINUED | OUTPATIENT
Start: 2022-12-07 | End: 2022-12-08

## 2022-12-07 RX ORDER — SODIUM CHLORIDE 9 MG/ML
1000 INJECTION INTRAMUSCULAR; INTRAVENOUS; SUBCUTANEOUS
Refills: 0 | Status: DISCONTINUED | OUTPATIENT
Start: 2022-12-07 | End: 2022-12-08

## 2022-12-07 RX ORDER — ACETAMINOPHEN 500 MG
650 TABLET ORAL EVERY 6 HOURS
Refills: 0 | Status: DISCONTINUED | OUTPATIENT
Start: 2022-12-07 | End: 2022-12-07

## 2022-12-07 RX ORDER — BRIMONIDINE TARTRATE 2 MG/MG
1 SOLUTION/ DROPS OPHTHALMIC EVERY 8 HOURS
Refills: 0 | Status: DISCONTINUED | OUTPATIENT
Start: 2022-12-07 | End: 2022-12-16

## 2022-12-07 RX ORDER — DEXTROSE 50 % IN WATER 50 %
15 SYRINGE (ML) INTRAVENOUS ONCE
Refills: 0 | Status: DISCONTINUED | OUTPATIENT
Start: 2022-12-07 | End: 2022-12-08

## 2022-12-07 RX ORDER — OXYBUTYNIN CHLORIDE 5 MG
5 TABLET ORAL
Refills: 0 | Status: DISCONTINUED | OUTPATIENT
Start: 2022-12-07 | End: 2022-12-16

## 2022-12-07 RX ORDER — SIMETHICONE 80 MG/1
80 TABLET, CHEWABLE ORAL EVERY 8 HOURS
Refills: 0 | Status: DISCONTINUED | OUTPATIENT
Start: 2022-12-07 | End: 2022-12-16

## 2022-12-07 RX ORDER — ATORVASTATIN CALCIUM 80 MG/1
40 TABLET, FILM COATED ORAL AT BEDTIME
Refills: 0 | Status: DISCONTINUED | OUTPATIENT
Start: 2022-12-07 | End: 2022-12-16

## 2022-12-07 RX ORDER — DEXTROSE 50 % IN WATER 50 %
12.5 SYRINGE (ML) INTRAVENOUS ONCE
Refills: 0 | Status: DISCONTINUED | OUTPATIENT
Start: 2022-12-07 | End: 2022-12-08

## 2022-12-07 RX ORDER — HYDROMORPHONE HYDROCHLORIDE 2 MG/ML
1 INJECTION INTRAMUSCULAR; INTRAVENOUS; SUBCUTANEOUS ONCE
Refills: 0 | Status: DISCONTINUED | OUTPATIENT
Start: 2022-12-07 | End: 2022-12-07

## 2022-12-07 RX ADMIN — OXYCODONE HYDROCHLORIDE 10 MILLIGRAM(S): 5 TABLET ORAL at 18:44

## 2022-12-07 RX ADMIN — HYDROMORPHONE HYDROCHLORIDE 1 MILLIGRAM(S): 2 INJECTION INTRAMUSCULAR; INTRAVENOUS; SUBCUTANEOUS at 19:28

## 2022-12-07 RX ADMIN — SENNA PLUS 2 TABLET(S): 8.6 TABLET ORAL at 22:18

## 2022-12-07 RX ADMIN — LATANOPROST 1 DROP(S): 0.05 SOLUTION/ DROPS OPHTHALMIC; TOPICAL at 23:22

## 2022-12-07 RX ADMIN — HYDROMORPHONE HYDROCHLORIDE 1 MILLIGRAM(S): 2 INJECTION INTRAMUSCULAR; INTRAVENOUS; SUBCUTANEOUS at 19:47

## 2022-12-07 RX ADMIN — METHOCARBAMOL 500 MILLIGRAM(S): 500 TABLET, FILM COATED ORAL at 22:19

## 2022-12-07 RX ADMIN — Medication 50 MILLIGRAM(S): at 22:19

## 2022-12-07 RX ADMIN — OXYCODONE HYDROCHLORIDE 10 MILLIGRAM(S): 5 TABLET ORAL at 17:44

## 2022-12-07 RX ADMIN — ATORVASTATIN CALCIUM 40 MILLIGRAM(S): 80 TABLET, FILM COATED ORAL at 22:19

## 2022-12-07 NOTE — H&P ADULT - NSHPPHYSICALEXAM_GEN_ALL_CORE
General: NAD, pt is comfortably resting in bed, A&O x3, on RA  HEENT: CN II-XII grossly intact, PERRL 3mm, EOMI b/l, face symmetric, tongue midline, neck FROM  Cardiovascular: RRR, normal S1 and S2   Respiratory: lungs CTAB, no wheezing, rhonchi, or crackles   GI: normoactive BS to auscultation, abd soft, NTND   Neuro: no aphasia, speech clear, no dysmetria, no pronator drift  b/l UE 5/5, b/l HF 2/5, KF 3/5, L DF/PF 5/5, R DF/PF 3/5 (baseline R foot deformity)   sensation intact to light touch throughout   Extremities: distal pulses 2+ x4    : #[id number], [place]

## 2022-12-07 NOTE — H&P ADULT - ASSESSMENT
Pt is a 63 yo F PMH polio (during childhood, chronic b/l LE weakness), DM, asthma, HLD, s/p L5-S1 alek laminectomy & TLIF, L4-S2/AI fusion (10/26 w/ Dr Manzanares) presents w/ b/l LE pain since surgery. Recently LLE pain became worse, associated w/ RLE numbness and b/l LE tingling with worsened LBP. Pt ambulates w/ walker at baseline. Pt admitted for pain management and possible intervention for CSF collection at prior surgical bed seen on MRI 12/6.     PLAN:  NEURO:  - neuro and vitals q4hrs   - pain control tylenol and oxycodone PRN, robaxin PRN   - MRI L spine completed 12/6; CSF fluid collection within the laminectomy/foraminotomy bed of L5-S1 w/ severe spinal canal stenosis and thecal sac compression w/ R >L neural foraminal narrowing (non-enhancing).   - pending plan for further intervention     CARDIO:  - SBP goal normotensive   - atorvastatin 40mg QD (home med)   - ASA 81mg QD - on hold     PULM:  - O2 sat goal > 92%, on RA     GI:  - consistent carb diet   - bowel regimen   - famotidine 20mg BID (home med)     RENAL:  - IVL, IVF while NPO overnight  - normonatremia goal     ENDO:  - ISS (DM)  - A1c pend admission     ID:  - afebrile, no leukocytosis   - no abx at this time     HEME:  - SCDs b/l LE   - no DVT chemoprophylaxis at this time     Dispo: regional status, full code, dispo pending further work-up   Assessment and plan discussed with Dr. Manzanares

## 2022-12-07 NOTE — H&P ADULT - HISTORY OF PRESENT ILLNESS
Pt is a 61 yo F PMH polio (chronic b/l LE weakness), DM, asthma, HLD, s/p L5-S1 alek laminectomy/TLIF, L4-S2/AI fusion (10/26 w/ Dr Manzanares) presents w/ b/l LE pain since surgery. Pt said she had RLE pain since surgery that has worsened. Recently LLE pain became worse, associated w/ RLE numbness and b/l LE tingling. Pt ambulates w/ walker at baseline.  Pt is a 63 yo F PMH polio (during childhood, chronic b/l LE weakness), DM, asthma, HLD, s/p L5-S1 alek laminectomy & TLIF, L4-S2/AI fusion (10/26 w/ Dr Manzanares) presents w/ b/l LE pain since surgery. Pt said she had RLE pain since surgery that has worsened. Recently LLE pain became worse, associated w/ RLE numbness and b/l LE tingling with worsened LBP. Pt ambulates w/ walker at baseline. MRI from 12/6 shows CSF fluid collection within the laminectomy/foraminotomy bed of L5-S1 w/ severe spinal canal stenosis and thecal sac compression w/ R >L neural foraminal narrowing (non-enhancing). Denies bowel/bladder incontinence, recent falls, recent travel or known sick contact.

## 2022-12-07 NOTE — H&P ADULT - NSHPLABSRESULTS_GEN_ALL_CORE
< from: MR Lumbar Spine w/wo IV Cont (12.06.22 @ 08:54) >      IMPRESSION:    Status post fusion spanning L4 through the pelvis. Large predominantly   CSF intensity fluid collection within the laminectomy/foraminotomy bed at   L5-S1 with resultant severe spinal canal stenosis an thecal sac   compression with right greater than left neural foraminal narrowing. The   fluid collection does not demonstrate significant rim of enhancement.   These findings may represent pseudomeningocele and CSFleak cannot be   excluded. Alternative considerations would include postoperative seroma.

## 2022-12-07 NOTE — H&P ADULT - NSHPREVIEWOFSYSTEMS_GEN_ALL_CORE
General:	no recent illnesses, no recent wt gain/loss, no chills  Skin/Breast:  no rash, lumps, new moles, erythema, tenderness  Ophthalmologic:  no change in vision, diplopia, pain, redness, tearing, dry eyes	  ENMT:	no hearing loss, tinnitus, ear pain, vertigo, nasal congestion, epistaxis, sore throat  Respiratory and Thorax: no coughing, wheezing, recent URI, shortness of breath	  Cardiovascular: no chest pain, CAMARA, leg swelling, irregular rhythm   Gastrointestinal:	no nausea, vomiting, diarrhea, abd pain, bloody stool, heartburn  Genitourinary: no frequency, dysuria, hematuria  Musculoskeletal:	no joint pain, no joint swelling, no tenderness  Neurological:	 see HPI  Psychiatric:	no confusion, no anxiousness, no depression   Hematology/Lymphatics:	no bruising, easy bleeding, LAD  Endocrine:  	no excess urination/thirst, heat/cold intolerance  Allergic/Immunologic:  no urticaria, sneezing, recurrent infections

## 2022-12-08 ENCOUNTER — APPOINTMENT (OUTPATIENT)
Dept: SPINE | Facility: CLINIC | Age: 62
End: 2022-12-08

## 2022-12-08 LAB
ANION GAP SERPL CALC-SCNC: 13 MMOL/L — SIGNIFICANT CHANGE UP (ref 5–17)
BUN SERPL-MCNC: 9 MG/DL — SIGNIFICANT CHANGE UP (ref 7–23)
CALCIUM SERPL-MCNC: 8.8 MG/DL — SIGNIFICANT CHANGE UP (ref 8.4–10.5)
CHLORIDE SERPL-SCNC: 103 MMOL/L — SIGNIFICANT CHANGE UP (ref 96–108)
CO2 SERPL-SCNC: 24 MMOL/L — SIGNIFICANT CHANGE UP (ref 22–31)
CREAT SERPL-MCNC: 0.17 MG/DL — LOW (ref 0.5–1.3)
EGFR: 139 ML/MIN/1.73M2 — SIGNIFICANT CHANGE UP
GLUCOSE BLDC GLUCOMTR-MCNC: 118 MG/DL — HIGH (ref 70–99)
GLUCOSE BLDC GLUCOMTR-MCNC: 121 MG/DL — HIGH (ref 70–99)
GLUCOSE BLDC GLUCOMTR-MCNC: 323 MG/DL — HIGH (ref 70–99)
GLUCOSE SERPL-MCNC: 129 MG/DL — HIGH (ref 70–99)
HCT VFR BLD CALC: 35 % — SIGNIFICANT CHANGE UP (ref 34.5–45)
HGB BLD-MCNC: 11.3 G/DL — LOW (ref 11.5–15.5)
MAGNESIUM SERPL-MCNC: 1.7 MG/DL — SIGNIFICANT CHANGE UP (ref 1.6–2.6)
MCHC RBC-ENTMCNC: 29.7 PG — SIGNIFICANT CHANGE UP (ref 27–34)
MCHC RBC-ENTMCNC: 32.3 GM/DL — SIGNIFICANT CHANGE UP (ref 32–36)
MCV RBC AUTO: 91.9 FL — SIGNIFICANT CHANGE UP (ref 80–100)
NRBC # BLD: 0 /100 WBCS — SIGNIFICANT CHANGE UP (ref 0–0)
PHOSPHATE SERPL-MCNC: 4.3 MG/DL — SIGNIFICANT CHANGE UP (ref 2.5–4.5)
PLATELET # BLD AUTO: 263 K/UL — SIGNIFICANT CHANGE UP (ref 150–400)
POTASSIUM SERPL-MCNC: 3.8 MMOL/L — SIGNIFICANT CHANGE UP (ref 3.5–5.3)
POTASSIUM SERPL-SCNC: 3.8 MMOL/L — SIGNIFICANT CHANGE UP (ref 3.5–5.3)
RBC # BLD: 3.81 M/UL — SIGNIFICANT CHANGE UP (ref 3.8–5.2)
RBC # FLD: 12.7 % — SIGNIFICANT CHANGE UP (ref 10.3–14.5)
SARS-COV-2 N GENE NPH QL NAA+PROBE: NOT DETECTED
SARS-COV-2 RNA SPEC QL NAA+PROBE: SIGNIFICANT CHANGE UP
SODIUM SERPL-SCNC: 140 MMOL/L — SIGNIFICANT CHANGE UP (ref 135–145)
WBC # BLD: 5.82 K/UL — SIGNIFICANT CHANGE UP (ref 3.8–10.5)
WBC # FLD AUTO: 5.82 K/UL — SIGNIFICANT CHANGE UP (ref 3.8–10.5)

## 2022-12-08 PROCEDURE — 99232 SBSQ HOSP IP/OBS MODERATE 35: CPT

## 2022-12-08 RX ORDER — POTASSIUM CHLORIDE 20 MEQ
40 PACKET (EA) ORAL EVERY 4 HOURS
Refills: 0 | Status: DISCONTINUED | OUTPATIENT
Start: 2022-12-08 | End: 2022-12-08

## 2022-12-08 RX ORDER — MAGNESIUM SULFATE 500 MG/ML
2 VIAL (ML) INJECTION ONCE
Refills: 0 | Status: COMPLETED | OUTPATIENT
Start: 2022-12-08 | End: 2022-12-08

## 2022-12-08 RX ORDER — POTASSIUM CHLORIDE 20 MEQ
40 PACKET (EA) ORAL EVERY 4 HOURS
Refills: 0 | Status: COMPLETED | OUTPATIENT
Start: 2022-12-08 | End: 2022-12-08

## 2022-12-08 RX ADMIN — BRIMONIDINE TARTRATE 1 DROP(S): 2 SOLUTION/ DROPS OPHTHALMIC at 07:46

## 2022-12-08 RX ADMIN — ATORVASTATIN CALCIUM 40 MILLIGRAM(S): 80 TABLET, FILM COATED ORAL at 21:49

## 2022-12-08 RX ADMIN — Medication 40 MILLIEQUIVALENT(S): at 13:43

## 2022-12-08 RX ADMIN — DORZOLAMIDE HYDROCHLORIDE 1 DROP(S): 20 SOLUTION/ DROPS OPHTHALMIC at 07:46

## 2022-12-08 RX ADMIN — Medication 75 MILLIGRAM(S): at 21:49

## 2022-12-08 RX ADMIN — OXYCODONE HYDROCHLORIDE 10 MILLIGRAM(S): 5 TABLET ORAL at 17:13

## 2022-12-08 RX ADMIN — OXYCODONE HYDROCHLORIDE 10 MILLIGRAM(S): 5 TABLET ORAL at 22:00

## 2022-12-08 RX ADMIN — Medication 40 MILLIEQUIVALENT(S): at 17:56

## 2022-12-08 RX ADMIN — BRIMONIDINE TARTRATE 1 DROP(S): 2 SOLUTION/ DROPS OPHTHALMIC at 21:49

## 2022-12-08 RX ADMIN — SODIUM CHLORIDE 75 MILLILITER(S): 9 INJECTION INTRAMUSCULAR; INTRAVENOUS; SUBCUTANEOUS at 05:08

## 2022-12-08 RX ADMIN — OXYCODONE HYDROCHLORIDE 10 MILLIGRAM(S): 5 TABLET ORAL at 18:00

## 2022-12-08 RX ADMIN — DORZOLAMIDE HYDROCHLORIDE 1 DROP(S): 20 SOLUTION/ DROPS OPHTHALMIC at 21:48

## 2022-12-08 RX ADMIN — Medication 75 MILLIGRAM(S): at 13:43

## 2022-12-08 RX ADMIN — SODIUM CHLORIDE 75 MILLILITER(S): 9 INJECTION INTRAMUSCULAR; INTRAVENOUS; SUBCUTANEOUS at 00:38

## 2022-12-08 RX ADMIN — Medication 8: at 17:07

## 2022-12-08 RX ADMIN — OXYCODONE HYDROCHLORIDE 10 MILLIGRAM(S): 5 TABLET ORAL at 11:49

## 2022-12-08 RX ADMIN — OXYCODONE HYDROCHLORIDE 10 MILLIGRAM(S): 5 TABLET ORAL at 06:27

## 2022-12-08 RX ADMIN — Medication 25 GRAM(S): at 13:51

## 2022-12-08 RX ADMIN — LATANOPROST 1 DROP(S): 0.05 SOLUTION/ DROPS OPHTHALMIC; TOPICAL at 21:48

## 2022-12-08 RX ADMIN — METHOCARBAMOL 500 MILLIGRAM(S): 500 TABLET, FILM COATED ORAL at 13:43

## 2022-12-08 RX ADMIN — OXYCODONE HYDROCHLORIDE 10 MILLIGRAM(S): 5 TABLET ORAL at 05:06

## 2022-12-08 RX ADMIN — Medication 50 MILLIGRAM(S): at 05:06

## 2022-12-08 RX ADMIN — OXYCODONE HYDROCHLORIDE 10 MILLIGRAM(S): 5 TABLET ORAL at 21:09

## 2022-12-08 RX ADMIN — OXYCODONE HYDROCHLORIDE 10 MILLIGRAM(S): 5 TABLET ORAL at 10:28

## 2022-12-08 NOTE — CONSULT NOTE ADULT - SUBJECTIVE AND OBJECTIVE BOX
NEUROSURGERY PAIN MANAGEMENT CONSULT NOTE    Chief Complaint: back pain, b/l LE pain     HPI:  Pt is a 61 yo F PMH polio (during childhood, chronic b/l LE weakness), DM, asthma, HLD, s/p L5-S1 alek laminectomy & TLIF, L4-S2/AI fusion (10/26 w/ Dr Manzanares) presents w/ b/l LE pain since surgery. Pt said she had RLE pain since surgery that has worsened. Recently LLE pain became worse, associated w/ RLE numbness and b/l LE tingling with worsened LBP. Pt ambulates w/ walker at baseline. MRI from 12/6 shows CSF fluid collection within the laminectomy/foraminotomy bed of L5-S1 w/ severe spinal canal stenosis and thecal sac compression w/ R >L neural foraminal narrowing (non-enhancing). Denies bowel/bladder incontinence, recent falls, recent travel or known sick contact.      (07 Dec 2022 18:36)      PAST MEDICAL & SURGICAL HISTORY:  Poliomyelitis      DM (diabetes mellitus)      Asthma      HTN (hypertension)      Hypercholesterolemia      Colonic polyp      Osteoarthritis      Trigger finger      Patella fracture  left      COVID-19 virus infection      History of colitis      History of urinary incontinence      Spondylolisthesis, lumbar region      Lumbar spondylosis  L5      History of shoulder surgery          FAMILY HISTORY:      SOCIAL HISTORY:  [ ] Denies Smoking, Alcohol, or Drug Use    HOME MEDICATIONS:   Please refer to initial HNP    PAIN HOME MEDICATIONS:    Allergies    No Known Allergies    Intolerances        PAIN MEDICATIONS:  acetaminophen     Tablet .. 1000 milliGRAM(s) Oral every 8 hours PRN  methocarbamol 500 milliGRAM(s) Oral every 8 hours PRN  oxyCODONE    IR 5 milliGRAM(s) Oral every 4 hours PRN  oxyCODONE    IR 10 milliGRAM(s) Oral every 4 hours PRN  pregabalin 75 milliGRAM(s) Oral every 8 hours    Heme:    Antibiotics:    Cardiovascular:    GI:  bisacodyl 5 milliGRAM(s) Oral daily PRN  famotidine    Tablet 20 milliGRAM(s) Oral two times a day PRN  senna 2 Tablet(s) Oral at bedtime  simethicone 80 milliGRAM(s) Chew every 8 hours PRN    Endocrine:  atorvastatin 40 milliGRAM(s) Oral at bedtime  insulin lispro (ADMELOG) corrective regimen sliding scale   SubCutaneous three times a day before meals    All Other Medications:  brimonidine 0.2% Ophthalmic Solution 1 Drop(s) Both EYES every 8 hours  dorzolamide 2% Ophthalmic Solution 1 Drop(s) Both EYES every 8 hours  latanoprost 0.005% Ophthalmic Solution 1 Drop(s) Both EYES at bedtime  magnesium sulfate  IVPB 2 Gram(s) IV Intermittent once  oxybutynin 5 milliGRAM(s) Oral two times a day PRN  potassium chloride   Powder 40 milliEquivalent(s) Oral every 4 hours  sodium chloride 0.9%. 1000 milliLiter(s) IV Continuous <Continuous>      Vital Signs Last 24 Hrs  T(C): 36.7 (08 Dec 2022 09:00), Max: 36.7 (08 Dec 2022 05:00)  T(F): 98 (08 Dec 2022 09:00), Max: 98 (08 Dec 2022 05:00)  HR: 76 (08 Dec 2022 09:00) (76 - 98)  BP: 109/71 (08 Dec 2022 09:00) (109/71 - 123/82)  BP(mean): --  RR: 18 (08 Dec 2022 09:00) (16 - 20)  SpO2: 95% (08 Dec 2022 09:00) (95% - 98%)    Parameters below as of 08 Dec 2022 09:00  Patient On (Oxygen Delivery Method): room air        LABS:                        11.3   5.82  )-----------( 263      ( 08 Dec 2022 07:21 )             35.0     12-08    140  |  103  |  9   ----------------------------<  129<H>  3.8   |  24  |  0.17<L>    Ca    8.8      08 Dec 2022 07:21  Phos  4.3     12-08  Mg     1.7     12-08      PT/INR - ( 07 Dec 2022 19:48 )   PT: 9.7 sec;   INR: 0.82          PTT - ( 07 Dec 2022 19:48 )  PTT:30.0 sec      RADIOLOGY:    Drug Screen:        REVIEW OF SYSTEMS:  CONSTITUTIONAL: No fever or fatigue O/N.   EYES: No eye pain, visual disturbances  ENMT:  No difficulty hearing. No throat pain  NECK: No pain or stiffness  RESPIRATORY: No cough, wheezing; No shortness of breath  CARDIOVASCULAR: No chest pain, palpitations.   GASTROINTESTINAL: Pt reports passing gas. No bowel movements. No abdominal or epigastric pain. No nausea, vomiting. GENITOURINARY: No dysuria, frequency, or incontinence  NEUROLOGICAL: No headaches, loss of strength, numbness, or tremors. No dizzinesss or lightheadedness with pain medications.   MUSCULOSKELETAL: Incisional back pain. No joint pain or swelling; No muscle, or extremity pain    PAIN ASSESSMENT: c/o back pain and RLE pain and numbness     PHYSICAL EXAM  GENERAL: Laying in bed, NAD  Neuro: CN II-XII PERRL, EOMI  Cranial nerves grossly intact  Motor exam: MAEx4  Sensation intact to LT in UE/LE in 3 dermatomes  CHEST/LUNG: Clear to auscultation bilaterally; No rales, rhonchi, wheezing, or rubs  HEART: Regular rate and rhythm; No murmurs, rubs, or gallops  ABDOMEN: Soft, Nontender, Nondistended; Bowel sounds present  EXTREMITIES:  2+ Peripheral Pulses, No clubbing, cyanosis, or edema  SKIN: No rashes or lesions      ASSESSMENT:   61 yo F PMH polio (during childhood, chronic b/l LE weakness), DM, asthma, HLD, s/p L5-S1 alek laminectomy & TLIF, L4-S2/AI fusion (10/26 w/ Dr Manzanares) presents w/ b/l LE pain since surgery. Recently LLE pain became worse, associated w/ RLE numbness and b/l LE tingling with worsened LBP. Pt ambulates w/ walker at baseline. Pt admitted for pain management and possible intervention for CSF collection at prior surgical bed seen on MRI 12/6.       PLAN:   - Pain:  Increase Lyrica to 75mg every 8 hours  Robaxin 500mg every 8 hours as needed for muscle spasm  Tylenol as needed for mild pain  Oxycodone 5/10mg every 4 hours as needed for moderate to severe pain     - Follow up, Discharge Planning: dispo pending   - Pain Management follow up plan: will cont to follow    d/w Dr. Osman    NEUROSURGERY PAIN MANAGEMENT CONSULT NOTE    Chief Complaint: back pain, b/l LE pain     HPI:  Pt is a 63 yo F PMH polio (during childhood, chronic b/l LE weakness), DM, asthma, HLD, s/p L5-S1 alek laminectomy & TLIF, L4-S2/AI fusion (10/26 w/ Dr Manzanares) presents w/ b/l LE pain since surgery. Pt said she had RLE pain since surgery that has worsened. Recently LLE pain became worse, associated w/ RLE numbness and b/l LE tingling with worsened LBP. Pt ambulates w/ walker at baseline. MRI from 12/6 shows CSF fluid collection within the laminectomy/foraminotomy bed of L5-S1 w/ severe spinal canal stenosis and thecal sac compression w/ R >L neural foraminal narrowing (non-enhancing). Denies bowel/bladder incontinence, recent falls, recent travel or known sick contact.      (07 Dec 2022 18:36)      PAST MEDICAL & SURGICAL HISTORY:  Poliomyelitis      DM (diabetes mellitus)      Asthma      HTN (hypertension)      Hypercholesterolemia      Colonic polyp      Osteoarthritis      Trigger finger      Patella fracture  left      COVID-19 virus infection      History of colitis      History of urinary incontinence      Spondylolisthesis, lumbar region      Lumbar spondylosis  L5      History of shoulder surgery          FAMILY HISTORY:      SOCIAL HISTORY:  [ ] Denies Smoking, Alcohol, or Drug Use    HOME MEDICATIONS:   Please refer to initial HNP    PAIN HOME MEDICATIONS:    Allergies    No Known Allergies    Intolerances        PAIN MEDICATIONS:  acetaminophen     Tablet .. 1000 milliGRAM(s) Oral every 8 hours PRN  methocarbamol 500 milliGRAM(s) Oral every 8 hours PRN  oxyCODONE    IR 5 milliGRAM(s) Oral every 4 hours PRN  oxyCODONE    IR 10 milliGRAM(s) Oral every 4 hours PRN  pregabalin 75 milliGRAM(s) Oral every 8 hours    Heme:    Antibiotics:    Cardiovascular:    GI:  bisacodyl 5 milliGRAM(s) Oral daily PRN  famotidine    Tablet 20 milliGRAM(s) Oral two times a day PRN  senna 2 Tablet(s) Oral at bedtime  simethicone 80 milliGRAM(s) Chew every 8 hours PRN    Endocrine:  atorvastatin 40 milliGRAM(s) Oral at bedtime  insulin lispro (ADMELOG) corrective regimen sliding scale   SubCutaneous three times a day before meals    All Other Medications:  brimonidine 0.2% Ophthalmic Solution 1 Drop(s) Both EYES every 8 hours  dorzolamide 2% Ophthalmic Solution 1 Drop(s) Both EYES every 8 hours  latanoprost 0.005% Ophthalmic Solution 1 Drop(s) Both EYES at bedtime  magnesium sulfate  IVPB 2 Gram(s) IV Intermittent once  oxybutynin 5 milliGRAM(s) Oral two times a day PRN  potassium chloride   Powder 40 milliEquivalent(s) Oral every 4 hours  sodium chloride 0.9%. 1000 milliLiter(s) IV Continuous <Continuous>      Vital Signs Last 24 Hrs  T(C): 36.7 (08 Dec 2022 09:00), Max: 36.7 (08 Dec 2022 05:00)  T(F): 98 (08 Dec 2022 09:00), Max: 98 (08 Dec 2022 05:00)  HR: 76 (08 Dec 2022 09:00) (76 - 98)  BP: 109/71 (08 Dec 2022 09:00) (109/71 - 123/82)  BP(mean): --  RR: 18 (08 Dec 2022 09:00) (16 - 20)  SpO2: 95% (08 Dec 2022 09:00) (95% - 98%)    Parameters below as of 08 Dec 2022 09:00  Patient On (Oxygen Delivery Method): room air        LABS:                        11.3   5.82  )-----------( 263      ( 08 Dec 2022 07:21 )             35.0     12-08    140  |  103  |  9   ----------------------------<  129<H>  3.8   |  24  |  0.17<L>    Ca    8.8      08 Dec 2022 07:21  Phos  4.3     12-08  Mg     1.7     12-08      PT/INR - ( 07 Dec 2022 19:48 )   PT: 9.7 sec;   INR: 0.82          PTT - ( 07 Dec 2022 19:48 )  PTT:30.0 sec      RADIOLOGY:    Drug Screen:        REVIEW OF SYSTEMS:  CONSTITUTIONAL: No fever or fatigue O/N.   EYES: No eye pain, visual disturbances  ENMT:  No difficulty hearing. No throat pain  NECK: No pain or stiffness  RESPIRATORY: No cough, wheezing; No shortness of breath  CARDIOVASCULAR: No chest pain, palpitations.   GASTROINTESTINAL: Pt reports passing gas. No bowel movements. No abdominal or epigastric pain. No nausea, vomiting. GENITOURINARY: No dysuria, frequency, or incontinence  NEUROLOGICAL: No headaches, loss of strength, numbness, or tremors. No dizzinesss or lightheadedness with pain medications.   MUSCULOSKELETAL: Incisional back pain. No joint pain or swelling; No muscle, or extremity pain    PAIN ASSESSMENT: c/o back pain and RLE pain and numbness     PHYSICAL EXAM  GENERAL: Laying in bed, NAD  Neuro: CN II-XII PERRL, EOMI  Cranial nerves grossly intact  Motor exam: MAEx4  Sensation intact to LT in UE/LE in 3 dermatomes  CHEST/LUNG: Clear to auscultation bilaterally; No rales, rhonchi, wheezing, or rubs  HEART: Regular rate and rhythm; No murmurs, rubs, or gallops  ABDOMEN: Soft, Nontender, Nondistended; Bowel sounds present  EXTREMITIES:  2+ Peripheral Pulses, No clubbing, cyanosis, or edema  SKIN: No rashes or lesions      ASSESSMENT:   63 yo F PMH polio (during childhood, chronic b/l LE weakness), DM, asthma, HLD, s/p L5-S1 alek laminectomy & TLIF, L4-S2/AI fusion (10/26 w/ Dr Manzanares) presents w/ b/l LE pain since surgery. Recently LLE pain became worse, associated w/ RLE numbness and b/l LE tingling with worsened LBP. Pt ambulates w/ walker at baseline. Pt admitted for pain management and possible intervention for CSF collection at prior surgical bed seen on MRI 12/6.       PLAN:   - Pain:  Increase Lyrica to 75mg every 8 hours  Robaxin 500mg every 8 hours as needed for muscle spasm  Tylenol as needed for mild pain  Oxycodone 5/10mg every 4 hours as needed for moderate to severe pain     - Follow up, Discharge Planning: dispo pending   - Pain Management follow up plan: will cont to follow  -  info: Annita, ID#: [id number]    d/w Dr. Osman

## 2022-12-08 NOTE — CHART NOTE - NSCHARTNOTEFT_GEN_A_CORE
JAMAL overnight, Pt c/o of LLE pain, pt NPO IR consulted for possible drainage of fluid collection. There is no fluctuation/ mass appreciated upon palpation of L5-S1 region. No breakdown of skin or visible drainage. No leukocytosis, patient afebrile.

## 2022-12-08 NOTE — PROGRESS NOTE ADULT - SUBJECTIVE AND OBJECTIVE BOX
HPI:  Pt is a 63 yo F PMH polio (during childhood, chronic b/l LE weakness), DM, asthma, HLD, s/p L5-S1 alek laminectomy & TLIF, L4-S2/AI fusion (10/26 w/ Dr Manzanares) presents w/ b/l LE pain since surgery. Pt said she had RLE pain since surgery that has worsened. Recently LLE pain became worse, associated w/ RLE numbness and b/l LE tingling with worsened LBP. Pt ambulates w/ walker at baseline. MRI from 12/6 shows CSF fluid collection within the laminectomy/foraminotomy bed of L5-S1 w/ severe spinal canal stenosis and thecal sac compression w/ R >L neural foraminal narrowing (non-enhancing). Denies bowel/bladder incontinence, recent falls, recent travel or known sick contact.      (07 Dec 2022 18:36)    OVERNIGHT EVENTS: JAMAL overnight     HOSPITAL COURSE:  12/7: admitted for pain control and possible CSF leak repair  12/8: JAMAL overnight         Vital Signs Last 24 Hrs  T(C): 36.6 (08 Dec 2022 00:09), Max: 36.6 (07 Dec 2022 17:24)  T(F): 97.8 (08 Dec 2022 00:09), Max: 97.9 (07 Dec 2022 17:24)  HR: 84 (08 Dec 2022 00:09) (78 - 84)  BP: 112/82 (08 Dec 2022 00:09) (112/82 - 119/78)  BP(mean): --  RR: 18 (08 Dec 2022 00:09) (16 - 20)  SpO2: 97% (08 Dec 2022 00:09) (95% - 97%)    Parameters below as of 08 Dec 2022 00:09  Patient On (Oxygen Delivery Method): room air        I&O's Summary      PHYSICAL EXAM:  General: NAD, pt is comfortably sitting up in bed, on room air  HEENT: EOMI b/l, face symmetric, tongue midline, neck FROM  Cardiovascular: Regular rate and rhythm  Respiratory: nonlabored breathing, normal chest rise  GI: abdomen soft, nontender, nondistended  Neuro: CN II-XII grossly intact, PERRL 3mm briskly reactive   A&Ox3, Setswana speaking, Follows commands.  MCCLENDON x4 spontaneously, b/l UE 5/5, b/l HF 2/5, KF 3/5, LLLE DF/PF 5/5, RLE DF/PF 3/5  Extremities: distal pulses 2+ x4          TUBES/LINES:  [] Jason  [] Wound Drains  [] Others      DIET:  [x] NPO  [] Mechanical  [] Tube feeds    LABS:                        11.7   5.83  )-----------( 288      ( 07 Dec 2022 19:48 )             36.0     12-07    139  |  101  |  14  ----------------------------<  121<H>  4.0   |  27  |  0.24<L>    Ca    9.5      07 Dec 2022 19:48  Phos  4.4     12-07  Mg     2.0     12-07      PT/INR - ( 07 Dec 2022 19:48 )   PT: 9.7 sec;   INR: 0.82          PTT - ( 07 Dec 2022 19:48 )  PTT:30.0 sec        CAPILLARY BLOOD GLUCOSE      POCT Blood Glucose.: 120 mg/dL (07 Dec 2022 21:52)  POCT Blood Glucose.: 131 mg/dL (07 Dec 2022 18:52)      Drug Levels: [] N/A    CSF Analysis: [] N/A      Allergies    No Known Allergies    Intolerances      MEDICATIONS:  Antibiotics:    Neuro:  acetaminophen     Tablet .. 1000 milliGRAM(s) Oral every 8 hours PRN  methocarbamol 500 milliGRAM(s) Oral every 8 hours PRN  oxyCODONE    IR 5 milliGRAM(s) Oral every 4 hours PRN  oxyCODONE    IR 10 milliGRAM(s) Oral every 4 hours PRN  pregabalin 50 milliGRAM(s) Oral every 8 hours    Anticoagulation:    OTHER:  atorvastatin 40 milliGRAM(s) Oral at bedtime  bisacodyl 5 milliGRAM(s) Oral daily PRN  brimonidine 0.2% Ophthalmic Solution 1 Drop(s) Both EYES every 8 hours  dorzolamide 2% Ophthalmic Solution 1 Drop(s) Both EYES every 8 hours  famotidine    Tablet 20 milliGRAM(s) Oral two times a day PRN  insulin lispro (ADMELOG) corrective regimen sliding scale   SubCutaneous three times a day before meals  latanoprost 0.005% Ophthalmic Solution 1 Drop(s) Both EYES at bedtime  oxybutynin 5 milliGRAM(s) Oral two times a day PRN  senna 2 Tablet(s) Oral at bedtime  simethicone 80 milliGRAM(s) Chew every 8 hours PRN    IVF:  sodium chloride 0.9%. 1000 milliLiter(s) IV Continuous <Continuous>    CULTURES:    RADIOLOGY & ADDITIONAL TESTS:  < from: Xray Lumbar Spine AP + Lateral (12.06.22 @ 09:17) >  Impression:  Status post fusion of L4 through the pelvis as above.      < from: MR Lumbar Spine w/wo IV Cont (12.06.22 @ 08:54) >  IMPRESSION:    Status post fusion spanning L4 through the pelvis. Large predominantly   CSF intensity fluid collection within the laminectomy/foraminotomy bed at   L5-S1 with resultant severe spinal canal stenosis an thecal sac   compression with right greater than left neural foraminal narrowing. The   fluid collection does not demonstrate significant rim of enhancement.   These findings may represent pseudomeningocele and CSFleak cannot be   excluded. Alternative considerations would include postoperative seroma.        ASSESSMENT:  63 yo F PMH polio (during childhood, chronic b/l LE weakness), DM, asthma, HLD, s/p L5-S1 alek laminectomy & TLIF, L4-S2/AI fusion (10/26 w/ Dr Manzanares) presents w/ b/l LE pain since surgery. Recently LLE pain became worse, associated w/ RLE numbness and b/l LE tingling with worsened LBP. Pt ambulates w/ walker at baseline. Pt admitted for pain management and possible intervention for CSF collection at prior surgical bed seen on MRI 12/6.     PLAN:   NEURO:  - neuro checks/vital signs q4hrs   - pain control: tylenol and oxycodone PRN, robaxin PRN   - MRI L spine completed 12/6; CSF fluid collection within the laminectomy/foraminotomy bed of L5-S1 w/ severe spinal canal stenosis and thecal sac compression w/ R >L neural foraminal narrowing (non-enhancing).   - pending plan for further intervention     CARDIO:  - SBP goal normotensive   - atorvastatin 40mg QD (home med)   - ASA 81mg QD - on hold     PULM:  - O2 sat goal > 92%, on RA     GI:  - consistent carb diet, NPO after midnight   - bowel regimen   - famotidine 20mg BID (home med)     RENAL:  - IVF while NPO overnight    ENDO:  - ISS, f/u A1C    ID:  - afebrile, no leukocytosis   - no abx at this time     HEME:  - SCDs b/l LE for DVT ppx  - no DVT chemoprophylaxis at this time     Dispo: regional status, full code, dispo pending further work-up     Assessment and plan d/w Dr. Manzanares

## 2022-12-09 ENCOUNTER — TRANSCRIPTION ENCOUNTER (OUTPATIENT)
Age: 62
End: 2022-12-09

## 2022-12-09 DIAGNOSIS — E78.00 PURE HYPERCHOLESTEROLEMIA, UNSPECIFIED: ICD-10-CM

## 2022-12-09 DIAGNOSIS — M47.816 SPONDYLOSIS WITHOUT MYELOPATHY OR RADICULOPATHY, LUMBAR REGION: ICD-10-CM

## 2022-12-09 DIAGNOSIS — A80.9 ACUTE POLIOMYELITIS, UNSPECIFIED: ICD-10-CM

## 2022-12-09 DIAGNOSIS — E11.9 TYPE 2 DIABETES MELLITUS WITHOUT COMPLICATIONS: ICD-10-CM

## 2022-12-09 DIAGNOSIS — I10 ESSENTIAL (PRIMARY) HYPERTENSION: ICD-10-CM

## 2022-12-09 DIAGNOSIS — D64.9 ANEMIA, UNSPECIFIED: ICD-10-CM

## 2022-12-09 DIAGNOSIS — Z29.9 ENCOUNTER FOR PROPHYLACTIC MEASURES, UNSPECIFIED: ICD-10-CM

## 2022-12-09 LAB
ANION GAP SERPL CALC-SCNC: 7 MMOL/L — SIGNIFICANT CHANGE UP (ref 5–17)
BUN SERPL-MCNC: 10 MG/DL — SIGNIFICANT CHANGE UP (ref 7–23)
CALCIUM SERPL-MCNC: 8.6 MG/DL — SIGNIFICANT CHANGE UP (ref 8.4–10.5)
CHLORIDE SERPL-SCNC: 106 MMOL/L — SIGNIFICANT CHANGE UP (ref 96–108)
CO2 SERPL-SCNC: 25 MMOL/L — SIGNIFICANT CHANGE UP (ref 22–31)
CREAT SERPL-MCNC: 0.19 MG/DL — LOW (ref 0.5–1.3)
EGFR: 135 ML/MIN/1.73M2 — SIGNIFICANT CHANGE UP
GLUCOSE BLDC GLUCOMTR-MCNC: 131 MG/DL — HIGH (ref 70–99)
GLUCOSE BLDC GLUCOMTR-MCNC: 151 MG/DL — HIGH (ref 70–99)
GLUCOSE BLDC GLUCOMTR-MCNC: 198 MG/DL — HIGH (ref 70–99)
GLUCOSE SERPL-MCNC: 115 MG/DL — HIGH (ref 70–99)
HCT VFR BLD CALC: 33.7 % — LOW (ref 34.5–45)
HGB BLD-MCNC: 10.9 G/DL — LOW (ref 11.5–15.5)
MAGNESIUM SERPL-MCNC: 1.9 MG/DL — SIGNIFICANT CHANGE UP (ref 1.6–2.6)
MCHC RBC-ENTMCNC: 29.2 PG — SIGNIFICANT CHANGE UP (ref 27–34)
MCHC RBC-ENTMCNC: 32.3 GM/DL — SIGNIFICANT CHANGE UP (ref 32–36)
MCV RBC AUTO: 90.3 FL — SIGNIFICANT CHANGE UP (ref 80–100)
NRBC # BLD: 0 /100 WBCS — SIGNIFICANT CHANGE UP (ref 0–0)
PHOSPHATE SERPL-MCNC: 4 MG/DL — SIGNIFICANT CHANGE UP (ref 2.5–4.5)
PLATELET # BLD AUTO: 251 K/UL — SIGNIFICANT CHANGE UP (ref 150–400)
POTASSIUM SERPL-MCNC: 3.9 MMOL/L — SIGNIFICANT CHANGE UP (ref 3.5–5.3)
POTASSIUM SERPL-SCNC: 3.9 MMOL/L — SIGNIFICANT CHANGE UP (ref 3.5–5.3)
RBC # BLD: 3.73 M/UL — LOW (ref 3.8–5.2)
RBC # FLD: 12.8 % — SIGNIFICANT CHANGE UP (ref 10.3–14.5)
SODIUM SERPL-SCNC: 138 MMOL/L — SIGNIFICANT CHANGE UP (ref 135–145)
WBC # BLD: 5.15 K/UL — SIGNIFICANT CHANGE UP (ref 3.8–10.5)
WBC # FLD AUTO: 5.15 K/UL — SIGNIFICANT CHANGE UP (ref 3.8–10.5)

## 2022-12-09 PROCEDURE — 99221 1ST HOSP IP/OBS SF/LOW 40: CPT

## 2022-12-09 RX ORDER — ACETAMINOPHEN 500 MG
1000 TABLET ORAL EVERY 8 HOURS
Refills: 0 | Status: DISCONTINUED | OUTPATIENT
Start: 2022-12-09 | End: 2022-12-13

## 2022-12-09 RX ORDER — ENOXAPARIN SODIUM 100 MG/ML
40 INJECTION SUBCUTANEOUS EVERY 24 HOURS
Refills: 0 | Status: DISCONTINUED | OUTPATIENT
Start: 2022-12-09 | End: 2022-12-11

## 2022-12-09 RX ORDER — METHOCARBAMOL 500 MG/1
500 TABLET, FILM COATED ORAL EVERY 8 HOURS
Refills: 0 | Status: DISCONTINUED | OUTPATIENT
Start: 2022-12-09 | End: 2022-12-16

## 2022-12-09 RX ADMIN — Medication 2: at 17:14

## 2022-12-09 RX ADMIN — ENOXAPARIN SODIUM 40 MILLIGRAM(S): 100 INJECTION SUBCUTANEOUS at 22:18

## 2022-12-09 RX ADMIN — OXYCODONE HYDROCHLORIDE 10 MILLIGRAM(S): 5 TABLET ORAL at 22:01

## 2022-12-09 RX ADMIN — OXYCODONE HYDROCHLORIDE 10 MILLIGRAM(S): 5 TABLET ORAL at 11:30

## 2022-12-09 RX ADMIN — OXYCODONE HYDROCHLORIDE 10 MILLIGRAM(S): 5 TABLET ORAL at 07:00

## 2022-12-09 RX ADMIN — SENNA PLUS 2 TABLET(S): 8.6 TABLET ORAL at 22:21

## 2022-12-09 RX ADMIN — BRIMONIDINE TARTRATE 1 DROP(S): 2 SOLUTION/ DROPS OPHTHALMIC at 14:12

## 2022-12-09 RX ADMIN — METHOCARBAMOL 500 MILLIGRAM(S): 500 TABLET, FILM COATED ORAL at 11:13

## 2022-12-09 RX ADMIN — Medication 1000 MILLIGRAM(S): at 22:18

## 2022-12-09 RX ADMIN — Medication 75 MILLIGRAM(S): at 22:18

## 2022-12-09 RX ADMIN — OXYCODONE HYDROCHLORIDE 10 MILLIGRAM(S): 5 TABLET ORAL at 21:01

## 2022-12-09 RX ADMIN — Medication 1000 MILLIGRAM(S): at 14:14

## 2022-12-09 RX ADMIN — METHOCARBAMOL 500 MILLIGRAM(S): 500 TABLET, FILM COATED ORAL at 19:17

## 2022-12-09 RX ADMIN — ATORVASTATIN CALCIUM 40 MILLIGRAM(S): 80 TABLET, FILM COATED ORAL at 22:18

## 2022-12-09 RX ADMIN — LATANOPROST 1 DROP(S): 0.05 SOLUTION/ DROPS OPHTHALMIC; TOPICAL at 22:19

## 2022-12-09 RX ADMIN — OXYCODONE HYDROCHLORIDE 10 MILLIGRAM(S): 5 TABLET ORAL at 03:00

## 2022-12-09 RX ADMIN — Medication 75 MILLIGRAM(S): at 14:13

## 2022-12-09 RX ADMIN — OXYCODONE HYDROCHLORIDE 10 MILLIGRAM(S): 5 TABLET ORAL at 10:35

## 2022-12-09 RX ADMIN — OXYCODONE HYDROCHLORIDE 10 MILLIGRAM(S): 5 TABLET ORAL at 06:11

## 2022-12-09 RX ADMIN — BRIMONIDINE TARTRATE 1 DROP(S): 2 SOLUTION/ DROPS OPHTHALMIC at 06:11

## 2022-12-09 RX ADMIN — DORZOLAMIDE HYDROCHLORIDE 1 DROP(S): 20 SOLUTION/ DROPS OPHTHALMIC at 06:11

## 2022-12-09 RX ADMIN — Medication 2: at 12:45

## 2022-12-09 RX ADMIN — DORZOLAMIDE HYDROCHLORIDE 1 DROP(S): 20 SOLUTION/ DROPS OPHTHALMIC at 14:12

## 2022-12-09 RX ADMIN — Medication 1000 MILLIGRAM(S): at 22:55

## 2022-12-09 RX ADMIN — Medication 75 MILLIGRAM(S): at 06:11

## 2022-12-09 RX ADMIN — BRIMONIDINE TARTRATE 1 DROP(S): 2 SOLUTION/ DROPS OPHTHALMIC at 22:19

## 2022-12-09 RX ADMIN — Medication 1000 MILLIGRAM(S): at 14:13

## 2022-12-09 RX ADMIN — OXYCODONE HYDROCHLORIDE 10 MILLIGRAM(S): 5 TABLET ORAL at 02:07

## 2022-12-09 RX ADMIN — DORZOLAMIDE HYDROCHLORIDE 1 DROP(S): 20 SOLUTION/ DROPS OPHTHALMIC at 22:19

## 2022-12-09 NOTE — CONSULT NOTE ADULT - PROBLEM SELECTOR RECOMMENDATION 7
DVT ppx: SCDs, can consider lovenox if no IR procedure while awaiting OR  Dispo: pending intervention and post op PT assessment

## 2022-12-09 NOTE — CONSULT NOTE ADULT - SUBJECTIVE AND OBJECTIVE BOX
Patient is a 62y old  Female who presents with a chief complaint of     HPI:  Pt is a 63 yo F PMH polio (during childhood, chronic b/l LE weakness), DM, asthma, HLD, s/p L5-S1 alek laminectomy & TLIF, L4-S2/AI fusion (10/26 w/ Dr Manzanares) presents w/ b/l LE pain since surgery. Pt said she had RLE pain since surgery that has worsened. Recently LLE pain became worse, associated w/ RLE numbness and b/l LE tingling with worsened LBP. Pt ambulates w/ walker at baseline. MRI from 12/6 shows CSF fluid collection within the laminectomy/foraminotomy bed of L5-S1 w/ severe spinal canal stenosis and thecal sac compression w/ R >L neural foraminal narrowing (non-enhancing). Denies bowel/bladder incontinence, recent falls, recent travel or known sick contact.      (07 Dec 2022 18:36)      Patient seen and examined at bedside.  Patient reports she continues to have low back pain and LE weakness that has been relatively unchanged since admission.  Denies fever, chills, CP, SOB.  No incontinence.    REVIEW OF SYSTEMS: see HPI    PAST MEDICAL & SURGICAL HISTORY:  Poliomyelitis      DM (diabetes mellitus)      Asthma      HTN (hypertension)      Hypercholesterolemia      Colonic polyp      Osteoarthritis      Trigger finger      Patella fracture  left      COVID-19 virus infection      History of colitis      History of urinary incontinence      Spondylolisthesis, lumbar region      Lumbar spondylosis  L5      History of shoulder surgery          FAMILY HISTORY:  No significant family cardiac hx    SOCIAL HISTORY:  Tobacco use: Never smoker  EtOH use: None   Recreational drug use: None    MEDICATIONS:  MEDICATIONS  (STANDING):  atorvastatin 40 milliGRAM(s) Oral at bedtime  brimonidine 0.2% Ophthalmic Solution 1 Drop(s) Both EYES every 8 hours  dorzolamide 2% Ophthalmic Solution 1 Drop(s) Both EYES every 8 hours  insulin lispro (ADMELOG) corrective regimen sliding scale   SubCutaneous three times a day before meals  latanoprost 0.005% Ophthalmic Solution 1 Drop(s) Both EYES at bedtime  pregabalin 75 milliGRAM(s) Oral every 8 hours  senna 2 Tablet(s) Oral at bedtime    MEDICATIONS  (PRN):  acetaminophen     Tablet .. 1000 milliGRAM(s) Oral every 8 hours PRN Temp greater or equal to 38C (100.4F), Mild Pain (1 - 3)  bisacodyl 5 milliGRAM(s) Oral daily PRN Constipation  famotidine    Tablet 20 milliGRAM(s) Oral two times a day PRN Dyspepsia  methocarbamol 500 milliGRAM(s) Oral every 8 hours PRN Muscle Spasm  oxybutynin 5 milliGRAM(s) Oral two times a day PRN Bladder spasms  oxyCODONE    IR 5 milliGRAM(s) Oral every 4 hours PRN Moderate Pain (4 - 6)  oxyCODONE    IR 10 milliGRAM(s) Oral every 4 hours PRN Severe Pain (7 - 10)  simethicone 80 milliGRAM(s) Chew every 8 hours PRN Gas      ALLERGIES:  Allergies    No Known Allergies    Intolerances        VITAL SIGNS:  Vital Signs Last 24 Hrs  T(C): 36.9 (09 Dec 2022 05:00), Max: 36.9 (09 Dec 2022 05:00)  T(F): 98.4 (09 Dec 2022 05:00), Max: 98.4 (09 Dec 2022 05:00)  HR: 69 (09 Dec 2022 05:00) (69 - 96)  BP: 108/71 (09 Dec 2022 05:00) (106/71 - 116/80)  BP(mean): --  RR: 17 (09 Dec 2022 05:00) (16 - 18)  SpO2: 95% (09 Dec 2022 05:00) (94% - 97%)    Parameters below as of 09 Dec 2022 05:00  Patient On (Oxygen Delivery Method): room air        12-08-22 @ 07:01  -  12-09-22 @ 07:00  --------------------------------------------------------  IN:    sodium chloride 0.9%: 525 mL  Total IN: 525 mL    OUT:    Voided (mL): 1775 mL  Total OUT: 1775 mL    Total NET: -1250 mL          PHYSICAL EXAM:  Constitutional: resting comfortably in bed; NAD  Head: NA/AT  Eyes: PERRL, EOMI, anicteric sclera  ENT: no nasal discharge; uvula midline, MMM  Neck: supple; no JVD  Respiratory: CTA B/L; no W/R/R, no retractions  Cardiac: +S1/S2; RRR; no M/R/G  Gastrointestinal: abdomen soft, NT/ND; no rebound or guarding; +BSx4  Extremities: WWP, no clubbing or cyanosis; no peripheral edema  Musculoskeletal: NROM x4; no joint swelling, tenderness or erythema  Vascular: 2+ radial, femoral, DP/PT pulses B/L  Dermatologic: skin warm, dry and intact; no rashes, wounds, or scars  Lymphatic: no submandibular or cervical LAD  Neurologic: AAOx3; CNII-XII grossly intact; no focal deficits  Psychiatric: affect and characteristics of appearance, verbalizations, behaviors are appropriate    LABS:                        10.9   5.15  )-----------( 251      ( 09 Dec 2022 05:24 )             33.7     12-09    138  |  106  |  10  ----------------------------<  115<H>  3.9   |  25  |  0.19<L>    Ca    8.6      09 Dec 2022 05:24  Phos  4.0     12-09  Mg     1.9     12-09      PT/INR - ( 07 Dec 2022 19:48 )   PT: 9.7 sec;   INR: 0.82          PTT - ( 07 Dec 2022 19:48 )  PTT:30.0 sec        CAPILLARY BLOOD GLUCOSE      POCT Blood Glucose.: 131 mg/dL (09 Dec 2022 07:09)  POCT Blood Glucose.: 323 mg/dL (08 Dec 2022 17:04)  POCT Blood Glucose.: 121 mg/dL (08 Dec 2022 11:51)          RADIOLOGY & ADDITIONAL TESTS:   < from: MR Lumbar Spine w/wo IV Cont (12.06.22 @ 08:54) >  IMPRESSION:    Status post fusion spanning L4 through the pelvis. Large predominantly   CSF intensity fluid collection within the laminectomy/foraminotomy bed at   L5-S1 with resultant severe spinal canal stenosis an thecal sac   compression with right greater than left neural foraminal narrowing. The   fluid collection does not demonstrate significant rim of enhancement.   These findings may represent pseudomeningocele and CSFleak cannot be   excluded. Alternative considerations would include postoperative seroma.    < end of copied text >   Patient is a 62y old  Female who presents with a chief complaint of     HPI:  Pt is a 61 yo F PMH polio (during childhood, chronic b/l LE weakness), DM, asthma, HLD, s/p L5-S1 alek laminectomy & TLIF, L4-S2/AI fusion (10/26 w/ Dr Manzanares) presents w/ b/l LE pain since surgery. Pt said she had RLE pain since surgery that has worsened. Recently LLE pain became worse, associated w/ RLE numbness and b/l LE tingling with worsened LBP. Pt ambulates w/ walker at baseline. MRI from 12/6 shows CSF fluid collection within the laminectomy/foraminotomy bed of L5-S1 w/ severe spinal canal stenosis and thecal sac compression w/ R >L neural foraminal narrowing (non-enhancing). Denies bowel/bladder incontinence, recent falls, recent travel or known sick contact.      (07 Dec 2022 18:36)      Patient seen and examined at bedside.  Patient reports she continues to have low back pain and LE weakness that has been relatively unchanged since admission.  Denies fever, chills, CP, SOB.  No incontinence.    REVIEW OF SYSTEMS: see HPI    PAST MEDICAL & SURGICAL HISTORY:  Poliomyelitis      DM (diabetes mellitus)      Asthma      HTN (hypertension)      Hypercholesterolemia      Colonic polyp      Osteoarthritis      Trigger finger      Patella fracture  left      COVID-19 virus infection      History of colitis      History of urinary incontinence      Spondylolisthesis, lumbar region      Lumbar spondylosis  L5      History of shoulder surgery          FAMILY HISTORY:  No significant family cardiac hx    SOCIAL HISTORY:  Tobacco use: Never smoker  EtOH use: None   Recreational drug use: None    MEDICATIONS:  MEDICATIONS  (STANDING):  atorvastatin 40 milliGRAM(s) Oral at bedtime  brimonidine 0.2% Ophthalmic Solution 1 Drop(s) Both EYES every 8 hours  dorzolamide 2% Ophthalmic Solution 1 Drop(s) Both EYES every 8 hours  insulin lispro (ADMELOG) corrective regimen sliding scale   SubCutaneous three times a day before meals  latanoprost 0.005% Ophthalmic Solution 1 Drop(s) Both EYES at bedtime  pregabalin 75 milliGRAM(s) Oral every 8 hours  senna 2 Tablet(s) Oral at bedtime    MEDICATIONS  (PRN):  acetaminophen     Tablet .. 1000 milliGRAM(s) Oral every 8 hours PRN Temp greater or equal to 38C (100.4F), Mild Pain (1 - 3)  bisacodyl 5 milliGRAM(s) Oral daily PRN Constipation  famotidine    Tablet 20 milliGRAM(s) Oral two times a day PRN Dyspepsia  methocarbamol 500 milliGRAM(s) Oral every 8 hours PRN Muscle Spasm  oxybutynin 5 milliGRAM(s) Oral two times a day PRN Bladder spasms  oxyCODONE    IR 5 milliGRAM(s) Oral every 4 hours PRN Moderate Pain (4 - 6)  oxyCODONE    IR 10 milliGRAM(s) Oral every 4 hours PRN Severe Pain (7 - 10)  simethicone 80 milliGRAM(s) Chew every 8 hours PRN Gas      ALLERGIES:  Allergies    No Known Allergies    Intolerances        VITAL SIGNS:  Vital Signs Last 24 Hrs  T(C): 36.9 (09 Dec 2022 05:00), Max: 36.9 (09 Dec 2022 05:00)  T(F): 98.4 (09 Dec 2022 05:00), Max: 98.4 (09 Dec 2022 05:00)  HR: 69 (09 Dec 2022 05:00) (69 - 96)  BP: 108/71 (09 Dec 2022 05:00) (106/71 - 116/80)  BP(mean): --  RR: 17 (09 Dec 2022 05:00) (16 - 18)  SpO2: 95% (09 Dec 2022 05:00) (94% - 97%)    Parameters below as of 09 Dec 2022 05:00  Patient On (Oxygen Delivery Method): room air        12-08-22 @ 07:01  -  12-09-22 @ 07:00  --------------------------------------------------------  IN:    sodium chloride 0.9%: 525 mL  Total IN: 525 mL    OUT:    Voided (mL): 1775 mL  Total OUT: 1775 mL    Total NET: -1250 mL          PHYSICAL EXAM:  Constitutional: resting comfortably in bed; NAD  Head: NA/AT  Eyes: PERRL, EOMI, anicteric sclera  ENT: no nasal discharge; uvula midline, MMM  Neck: supple; no JVD  Respiratory: CTA B/L; no W/R/R, no retractions  Cardiac: +S1/S2; RRR; no M/R/G  Gastrointestinal: abdomen soft, NT/ND; no rebound or guarding; +BSx4  Extremities: WWP, no clubbing or cyanosis; no peripheral edema  Musculoskeletal: NROM x4; no joint swelling, tenderness or erythema  Vascular: 2+ radial, femoral, DP/PT pulses B/L  Dermatologic: skin warm, dry and intact; no rashes, wounds, or scars  Lymphatic: no submandibular or cervical LAD  Neurologic: AAOx3; UEs 5/5 b/l, LE 3/5 B/l  Psychiatric: affect and characteristics of appearance, verbalizations, behaviors are appropriate    LABS:                        10.9   5.15  )-----------( 251      ( 09 Dec 2022 05:24 )             33.7     12-09    138  |  106  |  10  ----------------------------<  115<H>  3.9   |  25  |  0.19<L>    Ca    8.6      09 Dec 2022 05:24  Phos  4.0     12-09  Mg     1.9     12-09      PT/INR - ( 07 Dec 2022 19:48 )   PT: 9.7 sec;   INR: 0.82          PTT - ( 07 Dec 2022 19:48 )  PTT:30.0 sec        CAPILLARY BLOOD GLUCOSE      POCT Blood Glucose.: 131 mg/dL (09 Dec 2022 07:09)  POCT Blood Glucose.: 323 mg/dL (08 Dec 2022 17:04)  POCT Blood Glucose.: 121 mg/dL (08 Dec 2022 11:51)          RADIOLOGY & ADDITIONAL TESTS:   < from: MR Lumbar Spine w/wo IV Cont (12.06.22 @ 08:54) >  IMPRESSION:    Status post fusion spanning L4 through the pelvis. Large predominantly   CSF intensity fluid collection within the laminectomy/foraminotomy bed at   L5-S1 with resultant severe spinal canal stenosis an thecal sac   compression with right greater than left neural foraminal narrowing. The   fluid collection does not demonstrate significant rim of enhancement.   These findings may represent pseudomeningocele and CSFleak cannot be   excluded. Alternative considerations would include postoperative seroma.    < end of copied text >

## 2022-12-09 NOTE — CONSULT NOTE ADULT - ASSESSMENT
Assessment: 63 yo F PMH polio (during childhood, chronic b/l LE weakness), DM, asthma, HLD, s/p L5-S1 alek laminectomy & TLIF, L4-S2/AI fusion (10/26 w/ Dr. Manzanares) presents w/ b/l LE pain since surgery. Recently LLE pain became worse, associated w/ RLE numbness and b/l LE tingling with worsened LBP. MRI from 12/6 shows CSF fluid collection within the laminectomy/foraminotomy bed of L5-S1 w/ severe spinal canal stenosis and thecal sac compression w/ R >L neural foraminal narrowing (non-enhancing). IR consulted for L5/S1 fluid collection drainage. Case discussed with Dr. Mac, pt is afebrile and without leukocytosis, suspicion for abscess is low. Recommend against drain placement unless suspicions for abscess; would also need repeat CT scan to further elucidate anatomy s/p fusion.     Communicated with: neurosurgery team      
61 yo F PMH polio (during childhood, chronic b/l LE weakness), DM, asthma, HLD, s/p L5-S1 alek laminectomy & TLIF, L4-S2/AI fusion (10/26 w/ Dr Manzanares) presents w/ b/l LE pain since surgery. Recently LLE pain became worse, associated w/ RLE numbness and b/l LE tingling with worsened LBP. Pt ambulates w/ walker at baseline. Pt admitted for pain management and possible intervention for CSF collection at prior surgical bed seen on MRI 12/6.

## 2022-12-09 NOTE — CONSULT NOTE ADULT - PROBLEM SELECTOR RECOMMENDATION 4
Previously was on Amlodipine, Irbesartan/HCTZ as outpatient but was stopped on last admission due to orthostatic hypotension and normal BPs  - BP wnl would not restart medications at this time  - PCP follow up after DC

## 2022-12-09 NOTE — CHART NOTE - NSCHARTNOTEFT_GEN_A_CORE
JAMAL overnight. Pt c/o RLE pain and lower back pain. IR recommends against drain, unless suspicion for abscess. There is no fluctuation/ mass appreciated upon palpation of L5-S1 region. No breakdown of skin or visible drainage. No leukocytosis, patient afebrile. Likely plan for OR on Monday for wound exploration, pending final plan.

## 2022-12-09 NOTE — DISCHARGE NOTE NURSING/CASE MANAGEMENT/SOCIAL WORK - NSDCPEFALRISK_GEN_ALL_CORE
For information on Fall & Injury Prevention, visit: [email or web address] OR  [email or web address] OR  https://www.cdc.gov/steadi/patient.html

## 2022-12-09 NOTE — CONSULT NOTE ADULT - SUBJECTIVE AND OBJECTIVE BOX
61 yo F PMH polio (during childhood, chronic b/l LE weakness), DM, asthma, HLD, s/p L5-S1 alek laminectomy & TLIF, L4-S2/AI fusion (10/26 w/ Dr. Manzanares) presents w/ b/l LE pain since surgery. Recently LLE pain became worse, associated w/ RLE numbness and b/l LE tingling with worsened LBP. MRI from 12/6 shows CSF fluid collection within the laminectomy/foraminotomy bed of L5-S1 w/ severe spinal canal stenosis and thecal sac compression w/ R >L neural foraminal narrowing (non-enhancing). IR consulted for L5/S1 fluid collection drainage.     Clinical History: CSF LEAK    Acute poliomyelitis, unspecified    Acute posthemorrhagic anemia    ADVERSE EFFECT OF GLUCOCORT/SYNTH ANALOG, INIT    Anxiety disorder, unspecified    Arthrodesis status    Cauda equina syndrome    Congenital spondylolisthesis    COVID-19    Disease of spinal cord, unspecified    Encounter for other preprocedural examination    Encounter for prophylactic measures, unspecified    Essential (primary) hypertension    Foot drop, unspecified foot    Long term (current) use of oral hypoglycemic drugs    Migraine, unspecified, not intractable, without status migrainosus    Nutritional anemia, unspecified    Orthostatic hypotension    OTH IV DISC DISPLACEMENT LUMBAR RGN    Other acute postprocedural pain    Other idiopathic scoliosis, site unspecified    Other intervertebral disc degeneration, lumbar region    Other osteoporosis without current pathological fracture    Other specified disorders of bone density and structure, unspecified site    Other specified symptoms and signs involving the circulatory and respiratory systems    Other spondylosis with myelopathy, thoracic region    Other spondylosis with radiculopathy, lumbar region    Personal history of COVID-19    Personal history of other diseases of the digestive system    Personal history of other diseases of the musculoskeletal system and connective tissue    Personal history of other specified conditions    Polyneuropathy, unspecified    Polyp of colon    Postpolio syndrome    Pure hypercholesterolemia, unspecified    Radiculopathy, lumbar region    Radiculopathy, lumbosacral region    Repeated falls    Spinal stenosis, lumbar region without neurogenic claudication    Spondylolisthesis, lumbosacral region    Spondylolysis, lumbar region    Spondylosis without myelopathy or radiculopathy, cervical region    Spondylosis without myelopathy or radiculopathy, lumbar region    Spondylosis without myelopathy or radiculopathy, lumbosacral region    Trigger finger, unspecified finger    Type 2 diabetes mellitus without complications    Unspecified abdominal pain    Unspecified asthma, uncomplicated    Unspecified cord compression    Unspecified fracture of unspecified lumbar vertebra, subsequent encounter for fracture with nonunion    Unspecified fracture of unspecified patella, initial encounter for closed fracture    Unspecified osteoarthritis, unspecified site    Handoff    Poliomyelitis    DM (diabetes mellitus)    Asthma    HTN (hypertension)    Hypercholesterolemia    Colonic polyp    Osteoarthritis    Trigger finger    Patella fracture    COVID-19 virus infection    History of colitis    History of urinary incontinence    Spondylolisthesis, lumbar region    Lumbar spondylosis    History of shoulder surgery    [id number]        Meds:acetaminophen     Tablet .. 1000 milliGRAM(s) Oral every 8 hours PRN  atorvastatin 40 milliGRAM(s) Oral at bedtime  bisacodyl 5 milliGRAM(s) Oral daily PRN  brimonidine 0.2% Ophthalmic Solution 1 Drop(s) Both EYES every 8 hours  dorzolamide 2% Ophthalmic Solution 1 Drop(s) Both EYES every 8 hours  famotidine    Tablet 20 milliGRAM(s) Oral two times a day PRN  insulin lispro (ADMELOG) corrective regimen sliding scale   SubCutaneous three times a day before meals  latanoprost 0.005% Ophthalmic Solution 1 Drop(s) Both EYES at bedtime  methocarbamol 500 milliGRAM(s) Oral every 8 hours PRN  oxybutynin 5 milliGRAM(s) Oral two times a day PRN  oxyCODONE    IR 5 milliGRAM(s) Oral every 4 hours PRN  oxyCODONE    IR 10 milliGRAM(s) Oral every 4 hours PRN  pregabalin 75 milliGRAM(s) Oral every 8 hours  senna 2 Tablet(s) Oral at bedtime  simethicone 80 milliGRAM(s) Chew every 8 hours PRN      Allergies:No Known Allergies        Labs:                           10.9   5.15  )-----------( 251      ( 09 Dec 2022 05:24 )             33.7     PT/INR - ( 07 Dec 2022 19:48 )   PT: 9.7 sec;   INR: 0.82          PTT - ( 07 Dec 2022 19:48 )  PTT:30.0 sec  12-09    138  |  106  |  10  ----------------------------<  115<H>  3.9   |  25  |  0.19<L>    Ca    8.6      09 Dec 2022 05:24  Phos  4.0     12-09  Mg     1.9     12-09            Imaging Findings: Status post fusion spanning L4 through the pelvis. Large predominantly CSF intensity fluid collection within the laminectomy/foraminotomy bed at L5-S1 with resultant severe spinal canal stenosis an thecal sac compression with right greater than left neural foraminal narrowing. The fluid collection does not demonstrate significant rim of enhancement. These findings may represent pseudomeningocele and CSF leak cannot be excluded. Alternative considerations would include postoperative seroma.

## 2022-12-09 NOTE — CONSULT NOTE ADULT - PROBLEM SELECTOR RECOMMENDATION 2
Last A1c 6.3, on Januvia and Metformin at home  - last admission had steroid induced hypoglycemia but improved after taper and basal and premeal insulin was discontinued  - c/w MISS alone, trend FSG

## 2022-12-09 NOTE — CONSULT NOTE ADULT - PROBLEM SELECTOR RECOMMENDATION 9
- s/p L4-S2/AI fusion (10/26) now with fluid collection seen on MRI  - plan for OR on Monday for drainage vs IR drainage  - pain control per neurosurgery team  - PT post op

## 2022-12-09 NOTE — PROGRESS NOTE ADULT - SUBJECTIVE AND OBJECTIVE BOX
NEUROSURGERY PAIN MANAGEMENT CONSULT NOTE    Chief Complaint: back pain, RLE pain     HPI:  Pt is a 61 yo F PMH polio (during childhood, chronic b/l LE weakness), DM, asthma, HLD, s/p L5-S1 alek laminectomy & TLIF, L4-S2/AI fusion (10/26 w/ Dr Manzanares) presents w/ b/l LE pain since surgery. Pt said she had RLE pain since surgery that has worsened. Recently LLE pain became worse, associated w/ RLE numbness and b/l LE tingling with worsened LBP. Pt ambulates w/ walker at baseline. MRI from 12/6 shows CSF fluid collection within the laminectomy/foraminotomy bed of L5-S1 w/ severe spinal canal stenosis and thecal sac compression w/ R >L neural foraminal narrowing (non-enhancing). Denies bowel/bladder incontinence, recent falls, recent travel or known sick contact.      (07 Dec 2022 18:36)      PAST MEDICAL & SURGICAL HISTORY:  Poliomyelitis      DM (diabetes mellitus)      Asthma      HTN (hypertension)      Hypercholesterolemia      Colonic polyp      Osteoarthritis      Trigger finger      Patella fracture  left      COVID-19 virus infection      History of colitis      History of urinary incontinence      Spondylolisthesis, lumbar region      Lumbar spondylosis  L5      History of shoulder surgery          FAMILY HISTORY:      SOCIAL HISTORY:  [ ] Denies Smoking, Alcohol, or Drug Use    HOME MEDICATIONS:   Please refer to initial HNP    PAIN HOME MEDICATIONS:    Allergies    No Known Allergies    Intolerances        PAIN MEDICATIONS:  acetaminophen     Tablet .. 1000 milliGRAM(s) Oral every 8 hours  methocarbamol 500 milliGRAM(s) Oral every 8 hours  oxyCODONE    IR 5 milliGRAM(s) Oral every 4 hours PRN  oxyCODONE    IR 10 milliGRAM(s) Oral every 4 hours PRN  pregabalin 75 milliGRAM(s) Oral every 8 hours    Heme:    Antibiotics:    Cardiovascular:    GI:  bisacodyl 5 milliGRAM(s) Oral daily PRN  famotidine    Tablet 20 milliGRAM(s) Oral two times a day PRN  senna 2 Tablet(s) Oral at bedtime  simethicone 80 milliGRAM(s) Chew every 8 hours PRN    Endocrine:  atorvastatin 40 milliGRAM(s) Oral at bedtime  insulin lispro (ADMELOG) corrective regimen sliding scale   SubCutaneous three times a day before meals    All Other Medications:  brimonidine 0.2% Ophthalmic Solution 1 Drop(s) Both EYES every 8 hours  dorzolamide 2% Ophthalmic Solution 1 Drop(s) Both EYES every 8 hours  latanoprost 0.005% Ophthalmic Solution 1 Drop(s) Both EYES at bedtime  oxybutynin 5 milliGRAM(s) Oral two times a day PRN      Vital Signs Last 24 Hrs  T(C): 36.9 (09 Dec 2022 05:00), Max: 36.9 (09 Dec 2022 05:00)  T(F): 98.4 (09 Dec 2022 05:00), Max: 98.4 (09 Dec 2022 05:00)  HR: 69 (09 Dec 2022 05:00) (69 - 96)  BP: 108/71 (09 Dec 2022 05:00) (106/71 - 116/80)  BP(mean): --  RR: 17 (09 Dec 2022 05:00) (16 - 18)  SpO2: 95% (09 Dec 2022 05:00) (94% - 97%)    Parameters below as of 09 Dec 2022 05:00  Patient On (Oxygen Delivery Method): room air        LABS:                        10.9   5.15  )-----------( 251      ( 09 Dec 2022 05:24 )             33.7     12-09    138  |  106  |  10  ----------------------------<  115<H>  3.9   |  25  |  0.19<L>    Ca    8.6      09 Dec 2022 05:24  Phos  4.0     12-09  Mg     1.9     12-09      PT/INR - ( 07 Dec 2022 19:48 )   PT: 9.7 sec;   INR: 0.82          PTT - ( 07 Dec 2022 19:48 )  PTT:30.0 sec      RADIOLOGY:    Drug Screen:        REVIEW OF SYSTEMS:  CONSTITUTIONAL: No fever or fatigue O/N.   EYES: No eye pain, visual disturbances  ENMT:  No difficulty hearing. No throat pain  NECK: No pain or stiffness  RESPIRATORY: No cough, wheezing; No shortness of breath  CARDIOVASCULAR: No chest pain, palpitations.   GASTROINTESTINAL: Pt reports passing gas. No bowel movements. No abdominal or epigastric pain. No nausea, vomiting. GENITOURINARY: No dysuria, frequency, or incontinence  NEUROLOGICAL: No headaches, loss of strength, numbness, or tremors. No dizzinesss or lightheadedness with pain medications.   MUSCULOSKELETAL: Incisional back pain. No joint pain or swelling; No muscle, or extremity pain    PAIN ASSESSMENT: c/o low back muscle tightness, RLE pain     PHYSICAL EXAM  GENERAL: Laying in bed, NAD  Neuro: CN II-XII PERRL, EOMI  Cranial nerves grossly intact  Motor exam: MAEx4  Sensation intact to LT in UE/LE in 3 dermatomes  CHEST/LUNG: Clear to auscultation bilaterally; No rales, rhonchi, wheezing, or rubs  HEART: Regular rate and rhythm; No murmurs, rubs, or gallops  ABDOMEN: Soft, Nontender, Nondistended; Bowel sounds present  EXTREMITIES:  2+ Peripheral Pulses, No clubbing, cyanosis, or edema  SKIN: No rashes or lesions      ASSESSMENT:   61 yo F PMH polio (during childhood, chronic b/l LE weakness), DM, asthma, HLD, s/p L5-S1 alek laminectomy & TLIF, L4-S2/AI fusion (10/26 w/ Dr Manzanares) presents w/ b/l LE pain since surgery. Recently LLE pain became worse, associated w/ RLE numbness and b/l LE tingling with worsened LBP. Pt ambulates w/ walker at baseline. Pt admitted for pain management and possible intervention for CSF collection at prior surgical bed seen on MRI 12/6.       PLAN:   - Pain:  Change Tylenol to 1000mg every 8 hours standing   Lyrica to 75mg every 8 hours  Change Robaxin to 500mg every 8 hours standing   Oxycodone 5/10mg every 4 hours as needed for moderate to severe pain     - Bowel regimen: Senna    - Nausea ppx: Zofran standing  - Functional Goals: Pt will get OOB with PT today. Pt will resume previous level of activity without impairment from surgery.   - Additional Consults: None recommended.   - Additional Labs/Imaging:  None recommended.   - Follow up, Discharge Planning: pending IR vs surgical plan  - Pain Management follow up plan: will cont to follow    d/w Dr. Osman

## 2022-12-09 NOTE — CONSULT NOTE ADULT - PROBLEM SELECTOR RECOMMENDATION 5
Hgb 9-10 baseline  - likely anemia of chronic inflammation, had some blood loss anemia last hospitalization but now back at baseline

## 2022-12-09 NOTE — PROGRESS NOTE ADULT - SUBJECTIVE AND OBJECTIVE BOX
HPI:  Pt is a 63 yo F PMH polio (during childhood, chronic b/l LE weakness), DM, asthma, HLD, s/p L5-S1 alek laminectomy & TLIF, L4-S2/AI fusion (10/26 w/ Dr Manzanares) presents w/ b/l LE pain since surgery. Pt said she had RLE pain since surgery that has worsened. Recently LLE pain became worse, associated w/ RLE numbness and b/l LE tingling with worsened LBP. Pt ambulates w/ walker at baseline. MRI from 12/6 shows CSF fluid collection within the laminectomy/foraminotomy bed of L5-S1 w/ severe spinal canal stenosis and thecal sac compression w/ R >L neural foraminal narrowing (non-enhancing). Denies bowel/bladder incontinence, recent falls, recent travel or known sick contact.      (07 Dec 2022 18:36)    [place]  ID#: [id number]  OVERNIGHT EVENTS: c/o RLE pain, denies LLE pain.     HOSPITAL COURSE:   12/7: admitted for pain control and possible CSF leak repair  12/8: JAMAL overnight, Pt c/o of LLE pain, pt NPO IR consulted for possible drainage of fluid collection.   12/9: c/o RLE pain o/n.       Vital Signs Last 24 Hrs  T(C): 36.7 (09 Dec 2022 00:12), Max: 36.7 (08 Dec 2022 05:00)  T(F): 98 (09 Dec 2022 00:12), Max: 98 (08 Dec 2022 05:00)  HR: 80 (09 Dec 2022 00:12) (73 - 98)  BP: 107/74 (09 Dec 2022 00:12) (106/71 - 123/82)  BP(mean): --  RR: 18 (09 Dec 2022 00:12) (16 - 20)  SpO2: 94% (09 Dec 2022 00:12) (94% - 98%)    Parameters below as of 09 Dec 2022 00:12  Patient On (Oxygen Delivery Method): room air        I&O's Summary    07 Dec 2022 07:01  -  08 Dec 2022 07:00  --------------------------------------------------------  IN: 0 mL / OUT: 400 mL / NET: -400 mL    08 Dec 2022 07:01  -  09 Dec 2022 02:34  --------------------------------------------------------  IN: 525 mL / OUT: 1775 mL / NET: -1250 mL        PHYSICAL EXAM:  General: NAD, pt is comfortably sitting up in bed, on room air  HEENT: EOMI b/l, face symmetric, tongue midline, neck FROM  Cardiovascular: Regular rate and rhythm  Respiratory: nonlabored breathing, normal chest rise  GI: abdomen soft, nontender, nondistended  Neuro: CN II-XII grossly intact, PERRL 3mm briskly reactive   A&Ox3, [place] speaking, Follows commands.  [place] x4 spontaneously, b/l UE 5/5, b/l HF/KF 2/5 LLE DF/PF 5/5, RLE DF/PF 3/5  Extremities: distal pulses 2+ x4      TUBES/LINES:  [] Jason  [] Wound Drains  [] Others      DIET:  [] NPO  [x] Mechanical  [] Tube feeds    LABS:                        11.3   5.82  )-----------( 263      ( 08 Dec 2022 07:21 )             35.0     12-08    140  |  103  |  9   ----------------------------<  129<H>  3.8   |  24  |  0.17<L>    Ca    8.8      08 Dec 2022 07:21  Phos  4.3     12-08  Mg     1.7     12-08      PT/INR - ( 07 Dec 2022 19:48 )   PT: 9.7 sec;   INR: 0.82          PTT - ( 07 Dec 2022 19:48 )  PTT:30.0 sec        CAPILLARY BLOOD GLUCOSE      POCT Blood Glucose.: 323 mg/dL (08 Dec 2022 17:04)  POCT Blood Glucose.: 121 mg/dL (08 Dec 2022 11:51)  POCT Blood Glucose.: 118 mg/dL (08 Dec 2022 06:48)      Drug Levels: [] N/A    CSF Analysis: [] N/A      Allergies    No Known Allergies    Intolerances      MEDICATIONS:  Antibiotics:    Neuro:  acetaminophen     Tablet .. 1000 milliGRAM(s) Oral every 8 hours PRN  methocarbamol 500 milliGRAM(s) Oral every 8 hours PRN  oxyCODONE    IR 5 milliGRAM(s) Oral every 4 hours PRN  oxyCODONE    IR 10 milliGRAM(s) Oral every 4 hours PRN  pregabalin 75 milliGRAM(s) Oral every 8 hours    Anticoagulation:    OTHER:  atorvastatin 40 milliGRAM(s) Oral at bedtime  bisacodyl 5 milliGRAM(s) Oral daily PRN  brimonidine 0.2% Ophthalmic Solution 1 Drop(s) Both EYES every 8 hours  dorzolamide 2% Ophthalmic Solution 1 Drop(s) Both EYES every 8 hours  famotidine    Tablet 20 milliGRAM(s) Oral two times a day PRN  insulin lispro (ADMELOG) corrective regimen sliding scale   SubCutaneous three times a day before meals  latanoprost 0.005% Ophthalmic Solution 1 Drop(s) Both EYES at bedtime  oxybutynin 5 milliGRAM(s) Oral two times a day PRN  senna 2 Tablet(s) Oral at bedtime  simethicone 80 milliGRAM(s) Chew every 8 hours PRN    IVF:    CULTURES:    RADIOLOGY & ADDITIONAL TESTS:  < from: Xray Lumbar Spine AP + Lateral (12.06.22 @ 09:17) >  Impression:  Status post fusion of L4 through the pelvis as above.          ASSESSMENT:    63 yo F PMH polio (during childhood, chronic b/l LE weakness), DM, asthma, HLD, s/p L5-S1 alek laminectomy & TLIF, L4-S2/AI fusion (10/26 w/ Dr Manzanares) presents w/ b/l LE pain since surgery. Recently LLE pain became worse, associated w/ RLE numbness and b/l LE tingling with worsened LBP. Pt ambulates w/ walker at baseline. Pt admitted for pain management and possible intervention for CSF collection at prior surgical bed seen on MRI 12/6.     PLAN:   NEURO:  - neuro checks/vital signs q4hrs   - pain control PRN: tylenol, oxycodone, robaxin, standing lyrica    - MRI L spine completed 12/6; CSF fluid collection within the laminectomy/foraminotomy bed of L5-S1 w/ severe spinal canal stenosis and thecal sac compression w/ R >L neural foraminal narrowing (non-enhancing).   - pending plan for further intervention     CARDIO:  - SBP goal normotensive   - atorvastatin 40mg QD (home med)   - ASA 81mg QD - on hold     PULM:  - O2 sat goal > 92%, on RA     GI:  - consistent carb diet  - bowel regimen   - famotidine 20mg BID (home med)     RENAL:  - IVL, voiding  - cont home oxybutinin    ENDO:  - ISS    ID:  - afebrile, no leukocytosis   - no abx at this time     HEME:  - SCDs b/l LE for DVT ppx  - no DVT chemoprophylaxis at this time     Dispo: regional status, full code, dispo pending further work-up     Assessment and plan d/w Dr. Manzanares

## 2022-12-09 NOTE — DISCHARGE NOTE NURSING/CASE MANAGEMENT/SOCIAL WORK - PATIENT PORTAL LINK FT
You can access the FollowMyHealth Patient Portal offered by [facility] by registering at the following website: [email or web address]. By joining [facility]’s FollowMyHealth portal, you will also be able to view your health information using other applications (apps) compatible with our system.

## 2022-12-10 LAB
ANION GAP SERPL CALC-SCNC: 9 MMOL/L — SIGNIFICANT CHANGE UP (ref 5–17)
BUN SERPL-MCNC: 10 MG/DL — SIGNIFICANT CHANGE UP (ref 7–23)
CALCIUM SERPL-MCNC: 8.5 MG/DL — SIGNIFICANT CHANGE UP (ref 8.4–10.5)
CHLORIDE SERPL-SCNC: 104 MMOL/L — SIGNIFICANT CHANGE UP (ref 96–108)
CO2 SERPL-SCNC: 25 MMOL/L — SIGNIFICANT CHANGE UP (ref 22–31)
CREAT SERPL-MCNC: 0.22 MG/DL — LOW (ref 0.5–1.3)
EGFR: 131 ML/MIN/1.73M2 — SIGNIFICANT CHANGE UP
GLUCOSE BLDC GLUCOMTR-MCNC: 141 MG/DL — HIGH (ref 70–99)
GLUCOSE BLDC GLUCOMTR-MCNC: 195 MG/DL — HIGH (ref 70–99)
GLUCOSE BLDC GLUCOMTR-MCNC: 245 MG/DL — HIGH (ref 70–99)
GLUCOSE BLDC GLUCOMTR-MCNC: 336 MG/DL — HIGH (ref 70–99)
GLUCOSE SERPL-MCNC: 277 MG/DL — HIGH (ref 70–99)
HCT VFR BLD CALC: 32.1 % — LOW (ref 34.5–45)
HGB BLD-MCNC: 10.7 G/DL — LOW (ref 11.5–15.5)
MAGNESIUM SERPL-MCNC: 1.9 MG/DL — SIGNIFICANT CHANGE UP (ref 1.6–2.6)
MCHC RBC-ENTMCNC: 29.7 PG — SIGNIFICANT CHANGE UP (ref 27–34)
MCHC RBC-ENTMCNC: 33.3 GM/DL — SIGNIFICANT CHANGE UP (ref 32–36)
MCV RBC AUTO: 89.2 FL — SIGNIFICANT CHANGE UP (ref 80–100)
NRBC # BLD: 0 /100 WBCS — SIGNIFICANT CHANGE UP (ref 0–0)
PHOSPHATE SERPL-MCNC: 3.7 MG/DL — SIGNIFICANT CHANGE UP (ref 2.5–4.5)
PLATELET # BLD AUTO: 254 K/UL — SIGNIFICANT CHANGE UP (ref 150–400)
POTASSIUM SERPL-MCNC: 3.8 MMOL/L — SIGNIFICANT CHANGE UP (ref 3.5–5.3)
POTASSIUM SERPL-SCNC: 3.8 MMOL/L — SIGNIFICANT CHANGE UP (ref 3.5–5.3)
RBC # BLD: 3.6 M/UL — LOW (ref 3.8–5.2)
RBC # FLD: 12.7 % — SIGNIFICANT CHANGE UP (ref 10.3–14.5)
SODIUM SERPL-SCNC: 138 MMOL/L — SIGNIFICANT CHANGE UP (ref 135–145)
WBC # BLD: 5.06 K/UL — SIGNIFICANT CHANGE UP (ref 3.8–10.5)
WBC # FLD AUTO: 5.06 K/UL — SIGNIFICANT CHANGE UP (ref 3.8–10.5)

## 2022-12-10 PROCEDURE — 99232 SBSQ HOSP IP/OBS MODERATE 35: CPT

## 2022-12-10 PROCEDURE — 99233 SBSQ HOSP IP/OBS HIGH 50: CPT

## 2022-12-10 RX ADMIN — Medication 4: at 11:55

## 2022-12-10 RX ADMIN — METHOCARBAMOL 500 MILLIGRAM(S): 500 TABLET, FILM COATED ORAL at 18:19

## 2022-12-10 RX ADMIN — Medication 2: at 18:21

## 2022-12-10 RX ADMIN — Medication 1000 MILLIGRAM(S): at 23:50

## 2022-12-10 RX ADMIN — BRIMONIDINE TARTRATE 1 DROP(S): 2 SOLUTION/ DROPS OPHTHALMIC at 06:24

## 2022-12-10 RX ADMIN — Medication 1000 MILLIGRAM(S): at 13:18

## 2022-12-10 RX ADMIN — OXYCODONE HYDROCHLORIDE 10 MILLIGRAM(S): 5 TABLET ORAL at 22:50

## 2022-12-10 RX ADMIN — METHOCARBAMOL 500 MILLIGRAM(S): 500 TABLET, FILM COATED ORAL at 11:48

## 2022-12-10 RX ADMIN — Medication 1000 MILLIGRAM(S): at 07:00

## 2022-12-10 RX ADMIN — OXYCODONE HYDROCHLORIDE 10 MILLIGRAM(S): 5 TABLET ORAL at 18:19

## 2022-12-10 RX ADMIN — OXYCODONE HYDROCHLORIDE 10 MILLIGRAM(S): 5 TABLET ORAL at 05:21

## 2022-12-10 RX ADMIN — LATANOPROST 1 DROP(S): 0.05 SOLUTION/ DROPS OPHTHALMIC; TOPICAL at 22:52

## 2022-12-10 RX ADMIN — ATORVASTATIN CALCIUM 40 MILLIGRAM(S): 80 TABLET, FILM COATED ORAL at 22:51

## 2022-12-10 RX ADMIN — OXYCODONE HYDROCHLORIDE 10 MILLIGRAM(S): 5 TABLET ORAL at 14:14

## 2022-12-10 RX ADMIN — BRIMONIDINE TARTRATE 1 DROP(S): 2 SOLUTION/ DROPS OPHTHALMIC at 13:20

## 2022-12-10 RX ADMIN — Medication 1000 MILLIGRAM(S): at 06:24

## 2022-12-10 RX ADMIN — BRIMONIDINE TARTRATE 1 DROP(S): 2 SOLUTION/ DROPS OPHTHALMIC at 22:52

## 2022-12-10 RX ADMIN — METHOCARBAMOL 500 MILLIGRAM(S): 500 TABLET, FILM COATED ORAL at 03:29

## 2022-12-10 RX ADMIN — DORZOLAMIDE HYDROCHLORIDE 1 DROP(S): 20 SOLUTION/ DROPS OPHTHALMIC at 22:52

## 2022-12-10 RX ADMIN — Medication 75 MILLIGRAM(S): at 22:51

## 2022-12-10 RX ADMIN — Medication 1000 MILLIGRAM(S): at 14:00

## 2022-12-10 RX ADMIN — DORZOLAMIDE HYDROCHLORIDE 1 DROP(S): 20 SOLUTION/ DROPS OPHTHALMIC at 06:25

## 2022-12-10 RX ADMIN — Medication 5 MILLIGRAM(S): at 18:23

## 2022-12-10 RX ADMIN — SENNA PLUS 2 TABLET(S): 8.6 TABLET ORAL at 22:51

## 2022-12-10 RX ADMIN — Medication 75 MILLIGRAM(S): at 06:24

## 2022-12-10 RX ADMIN — ENOXAPARIN SODIUM 40 MILLIGRAM(S): 100 INJECTION SUBCUTANEOUS at 22:51

## 2022-12-10 RX ADMIN — OXYCODONE HYDROCHLORIDE 10 MILLIGRAM(S): 5 TABLET ORAL at 23:50

## 2022-12-10 RX ADMIN — OXYCODONE HYDROCHLORIDE 10 MILLIGRAM(S): 5 TABLET ORAL at 06:21

## 2022-12-10 RX ADMIN — OXYCODONE HYDROCHLORIDE 10 MILLIGRAM(S): 5 TABLET ORAL at 15:23

## 2022-12-10 RX ADMIN — Medication 1000 MILLIGRAM(S): at 22:50

## 2022-12-10 RX ADMIN — OXYCODONE HYDROCHLORIDE 10 MILLIGRAM(S): 5 TABLET ORAL at 19:19

## 2022-12-10 RX ADMIN — Medication 75 MILLIGRAM(S): at 13:19

## 2022-12-10 RX ADMIN — DORZOLAMIDE HYDROCHLORIDE 1 DROP(S): 20 SOLUTION/ DROPS OPHTHALMIC at 13:20

## 2022-12-10 NOTE — PROGRESS NOTE ADULT - PROBLEM SELECTOR PLAN 8
- Patient being evaluated for L4-S2 fluid collection drainage.  - RCRI score of 1.  - WILLS score of 0.  - No cardiac disease present. DM is well controlled.  - No physical exertion present.   - Patient is cleared for L4-S2 drainage from a medical standpoint.

## 2022-12-10 NOTE — PROGRESS NOTE ADULT - SUBJECTIVE AND OBJECTIVE BOX
Patient is a 62y old  Female who presents with a chief complaint of     SUBJECTIVE / OVERNIGHT EVENTS:  Reports periodic pain, but responsive to Oxycodone.     MEDICATIONS  (STANDING):  acetaminophen     Tablet .. 1000 milliGRAM(s) Oral every 8 hours  atorvastatin 40 milliGRAM(s) Oral at bedtime  brimonidine 0.2% Ophthalmic Solution 1 Drop(s) Both EYES every 8 hours  dorzolamide 2% Ophthalmic Solution 1 Drop(s) Both EYES every 8 hours  enoxaparin Injectable 40 milliGRAM(s) SubCutaneous every 24 hours  insulin lispro (ADMELOG) corrective regimen sliding scale   SubCutaneous three times a day before meals  latanoprost 0.005% Ophthalmic Solution 1 Drop(s) Both EYES at bedtime  methocarbamol 500 milliGRAM(s) Oral every 8 hours  pregabalin 75 milliGRAM(s) Oral every 8 hours  senna 2 Tablet(s) Oral at bedtime    MEDICATIONS  (PRN):  bisacodyl 5 milliGRAM(s) Oral daily PRN Constipation  famotidine    Tablet 20 milliGRAM(s) Oral two times a day PRN Dyspepsia  oxybutynin 5 milliGRAM(s) Oral two times a day PRN Bladder spasms  oxyCODONE    IR 5 milliGRAM(s) Oral every 4 hours PRN Moderate Pain (4 - 6)  oxyCODONE    IR 10 milliGRAM(s) Oral every 4 hours PRN Severe Pain (7 - 10)  simethicone 80 milliGRAM(s) Chew every 8 hours PRN Gas      CAPILLARY BLOOD GLUCOSE      POCT Blood Glucose.: 245 mg/dL (10 Dec 2022 11:51)  POCT Blood Glucose.: 141 mg/dL (10 Dec 2022 07:47)  POCT Blood Glucose.: 151 mg/dL (09 Dec 2022 17:10)  POCT Blood Glucose.: 198 mg/dL (09 Dec 2022 12:35)    I&O's Summary      PHYSICAL EXAM:  Vital Signs Last 24 Hrs  T(C): 36.8 (10 Dec 2022 10:13), Max: 36.8 (10 Dec 2022 10:13)  T(F): 98.3 (10 Dec 2022 10:13), Max: 98.3 (10 Dec 2022 10:13)  HR: 74 (10 Dec 2022 10:51) (63 - 93)  BP: 104/68 (10 Dec 2022 10:51) (93/58 - 130/91)  BP(mean): --  RR: 19 (10 Dec 2022 10:13) (16 - 19)  SpO2: 96% (10 Dec 2022 10:13) (95% - 97%)    Parameters below as of 10 Dec 2022 10:13  Patient On (Oxygen Delivery Method): room air      GENERAL: NAD, well-developed  HEAD:  Atraumatic, Normocephalic  EYES: EOMI, PERRLA, conjunctiva and sclera clear  NECK: Supple, No JVD  CHEST/LUNG: Clear to auscultation bilaterally; No wheeze  HEART: Regular rate and rhythm; No murmurs, rubs, or gallops  ABDOMEN: Soft, Nontender, Nondistended; Bowel sounds present  EXTREMITIES:  2+ Peripheral Pulses, No clubbing, cyanosis, or edema  PSYCH: AAOx3  NEUROLOGY: non-focal  SKIN: No rashes or lesions    LABS:                        10.7   5.06  )-----------( 254      ( 10 Dec 2022 05:30 )             32.1     12-10    138  |  104  |  10  ----------------------------<  277<H>  3.8   |  25  |  0.22<L>    Ca    8.5      10 Dec 2022 05:30  Phos  3.7     12-10  Mg     1.9     12-10                RADIOLOGY & ADDITIONAL TESTS:    Imaging Personally Reviewed:    Consultant(s) Notes Reviewed:      Care Discussed with Consultants/Other Providers:

## 2022-12-10 NOTE — PROGRESS NOTE ADULT - PROBLEM SELECTOR PLAN 1
s/p L4-S2/AI fusion (10/26) now with fluid collection seen on MRI  - plan for OR on Monday for drainage vs IR drainage  - pain control per neurosurgery team  - PT post op. s/p L4-S2/AI fusion (10/26) now with fluid collection seen on MRI  - plan for OR on Monday for drainage  - pain control per neurosurgery team  - PT post op.

## 2022-12-10 NOTE — PROGRESS NOTE ADULT - SUBJECTIVE AND OBJECTIVE BOX
HPI:  Pt is a 61 yo F PMH polio (during childhood, chronic b/l LE weakness), DM, asthma, HLD, s/p L5-S1 alek laminectomy & TLIF, L4-S2/AI fusion (10/26 w/ Dr Manzanares) presents w/ b/l LE pain since surgery. Pt said she had RLE pain since surgery that has worsened. Recently LLE pain became worse, associated w/ RLE numbness and b/l LE tingling with worsened LBP. Pt ambulates w/ walker at baseline. MRI from 12/6 shows CSF fluid collection within the laminectomy/foraminotomy bed of L5-S1 w/ severe spinal canal stenosis and thecal sac compression w/ R >L neural foraminal narrowing (non-enhancing). Denies bowel/bladder incontinence, recent falls, recent travel or known sick contact.     HOSPITAL COURSE:   12/7: admitted for pain control and possible CSF leak repair  12/8: JAMAL overnight, Pt c/o of LLE pain, pt NPO IR consulted for possible drainage of fluid collection.   12/9: c/o RLE pain o/n.   12/10: JAMAL overnight. pending surgical plan       Vital Signs Last 24 Hrs  T(C): 36.6 (10 Dec 2022 00:33), Max: 36.9 (09 Dec 2022 05:00)  T(F): 97.8 (10 Dec 2022 00:33), Max: 98.4 (09 Dec 2022 05:00)  HR: 67 (10 Dec 2022 00:33) (67 - 73)  BP: 118/77 (10 Dec 2022 00:33) (108/71 - 130/91)  BP(mean): --  RR: 19 (10 Dec 2022 00:33) (16 - 19)  SpO2: 96% (10 Dec 2022 00:33) (95% - 96%)    Parameters below as of 10 Dec 2022 00:33  Patient On (Oxygen Delivery Method): room air        I&O's Detail    08 Dec 2022 07:01  -  09 Dec 2022 07:00  --------------------------------------------------------  IN:    sodium chloride 0.9%: 525 mL  Total IN: 525 mL    OUT:    Voided (mL): 1775 mL  Total OUT: 1775 mL    Total NET: -1250 mL        I&O's Summary    08 Dec 2022 07:01  -  09 Dec 2022 07:00  --------------------------------------------------------  IN: 525 mL / OUT: 1775 mL / NET: -1250 mL        PHYSICAL EXAM:  General: NAD, pt is comfortably sitting up in bed, on room air  HEENT: EOMI b/l, face symmetric, tongue midline, neck FROM  Cardiovascular: Regular rate and rhythm  Respiratory: nonlabored breathing, normal chest rise  GI: abdomen soft, nontender, nondistended  Neuro: CN II-XII grossly intact, PERRL 3mm briskly reactive   A&Ox3, Danish speaking, Follows commands.  MCCLENDON x4 spontaneously, b/l UE 5/5, b/l HF/KF 2/5 LLE DF/PF 5/5, RLE DF/PF 3/5  Extremities: distal pulses 2+ x4    TUBES/LINES:  [] CVC  [] A-line  [] Lumbar Drain  [] Ventriculostomy  [] Other    DIET:  [] NPO  [] Mechanical  [] Tube feeds    LABS:                        10.9   5.15  )-----------( 251      ( 09 Dec 2022 05:24 )             33.7     12-09    138  |  106  |  10  ----------------------------<  115<H>  3.9   |  25  |  0.19<L>    Ca    8.6      09 Dec 2022 05:24  Phos  4.0     12-09  Mg     1.9     12-09              CAPILLARY BLOOD GLUCOSE      POCT Blood Glucose.: 151 mg/dL (09 Dec 2022 17:10)  POCT Blood Glucose.: 198 mg/dL (09 Dec 2022 12:35)  POCT Blood Glucose.: 131 mg/dL (09 Dec 2022 07:09)      Drug Levels: [] N/A    CSF Analysis: [] N/A      Allergies    No Known Allergies    Intolerances      MEDICATIONS:  Antibiotics:    Neuro:  acetaminophen     Tablet .. 1000 milliGRAM(s) Oral every 8 hours  methocarbamol 500 milliGRAM(s) Oral every 8 hours  oxyCODONE    IR 5 milliGRAM(s) Oral every 4 hours PRN  oxyCODONE    IR 10 milliGRAM(s) Oral every 4 hours PRN  pregabalin 75 milliGRAM(s) Oral every 8 hours    Anticoagulation:  enoxaparin Injectable 40 milliGRAM(s) SubCutaneous every 24 hours    OTHER:  atorvastatin 40 milliGRAM(s) Oral at bedtime  bisacodyl 5 milliGRAM(s) Oral daily PRN  brimonidine 0.2% Ophthalmic Solution 1 Drop(s) Both EYES every 8 hours  dorzolamide 2% Ophthalmic Solution 1 Drop(s) Both EYES every 8 hours  famotidine    Tablet 20 milliGRAM(s) Oral two times a day PRN  insulin lispro (ADMELOG) corrective regimen sliding scale   SubCutaneous three times a day before meals  latanoprost 0.005% Ophthalmic Solution 1 Drop(s) Both EYES at bedtime  oxybutynin 5 milliGRAM(s) Oral two times a day PRN  senna 2 Tablet(s) Oral at bedtime  simethicone 80 milliGRAM(s) Chew every 8 hours PRN    IVF:    CULTURES:    RADIOLOGY & ADDITIONAL TESTS:      ASSESSMENT:  61 yo F PMH polio (during childhood, chronic b/l LE weakness), DM, asthma, HLD, s/p L5-S1 alek laminectomy & TLIF, L4-S2/AI fusion (10/26 w/ Dr Manzanares) presents w/ b/l LE pain since surgery. Recently LLE pain became worse, associated w/ RLE numbness and b/l LE tingling with worsened LBP. Pt ambulates w/ walker at baseline. Pt admitted for pain management and possible intervention for CSF collection at prior surgical bed seen on MRI 12/6.     CSF LEAK    Acute poliomyelitis, unspecified    Acute posthemorrhagic anemia    ADVERSE EFFECT OF GLUCOCORT/SYNTH ANALOG, INIT    Anxiety disorder, unspecified    Arthrodesis status    Cauda equina syndrome    Congenital spondylolisthesis    COVID-19    Disease of spinal cord, unspecified    Encounter for other preprocedural examination    Encounter for prophylactic measures, unspecified    Essential (primary) hypertension    Foot drop, unspecified foot    Long term (current) use of oral hypoglycemic drugs    Migraine, unspecified, not intractable, without status migrainosus    Nutritional anemia, unspecified    Orthostatic hypotension    OTH IV DISC DISPLACEMENT LUMBAR RGN    Other acute postprocedural pain    Other idiopathic scoliosis, site unspecified    Other intervertebral disc degeneration, lumbar region    Other osteoporosis without current pathological fracture    Other specified disorders of bone density and structure, unspecified site    Other specified symptoms and signs involving the circulatory and respiratory systems    Other spondylosis with myelopathy, thoracic region    Other spondylosis with radiculopathy, lumbar region    Personal history of COVID-19    Personal history of other diseases of the digestive system    Personal history of other diseases of the musculoskeletal system and connective tissue    Personal history of other specified conditions    Polyneuropathy, unspecified    Polyp of colon    Postpolio syndrome    Pure hypercholesterolemia, unspecified    Radiculopathy, lumbar region    Radiculopathy, lumbosacral region    Repeated falls    Spinal stenosis, lumbar region without neurogenic claudication    Spondylolisthesis, lumbosacral region    Spondylolysis, lumbar region    Spondylosis without myelopathy or radiculopathy, cervical region    Spondylosis without myelopathy or radiculopathy, lumbar region    Spondylosis without myelopathy or radiculopathy, lumbosacral region    Trigger finger, unspecified finger    Type 2 diabetes mellitus without complications    Unspecified abdominal pain    Unspecified asthma, uncomplicated    Unspecified cord compression    Unspecified fracture of unspecified lumbar vertebra, subsequent encounter for fracture with nonunion    Unspecified fracture of unspecified patella, initial encounter for closed fracture    Unspecified osteoarthritis, unspecified site    Handoff    Poliomyelitis    DM (diabetes mellitus)    Asthma    HTN (hypertension)    Hypercholesterolemia    Colonic polyp    Osteoarthritis    Trigger finger    Patella fracture    COVID-19 virus infection    History of colitis    History of urinary incontinence    Spondylolisthesis, lumbar region    Lumbar spondylosis    Lumbar spondylosis    DM (diabetes mellitus)    Hypercholesterolemia    HTN (hypertension)    Poliomyelitis    Prophylactic measure    Anemia    History of shoulder surgery    DM (diabetes mellitus)    [email or web address]        PLAN:  NEURO:  - neuro checks/vital signs q4hrs   - pain control PRN: tylenol, oxycodone, robaxin, standing lyrica    - MRI L spine completed 12/6; CSF fluid collection within the laminectomy/foraminotomy bed of L5-S1 w/ severe spinal canal stenosis and thecal sac compression w/ R >L neural foraminal narrowing (non-enhancing).   - pending plan for further intervention     CARDIO:  - SBP goal normotensive   - atorvastatin 40mg QD (home med)   - ASA 81mg QD - on hold     PULM:  - O2 sat goal > 92%, on RA     GI:  - consistent carb diet  - bowel regimen   - famotidine 20mg BID (home med)     RENAL:  - IVL, voiding  - cont home oxybutinin    ENDO:  - ISS    ID:  - afebrile, no leukocytosis   - no abx at this time     HEME:  - SCDs/SQL DVT ppx    Dispo: regional status, full code, dispo pending further work-up     Assessment and plan d/w Dr. Manzanares           Assessment:  Present when checked    []  GCS  E   V  M     Heart Failure: []Acute, [] acute on chronic , []chronic  Heart Failure:  [] Diastolic (HFpEF), [] Systolic (HFrEF), []Combined (HFpEF and HFrEF), [] RHF, [] Pulm HTN, [] Other    [] DERICK, [] ATN, [] AIN, [] other  [] CKD1, [] CKD2, [] CKD 3, [] CKD 4, [] CKD 5, []ESRD    Encephalopathy: [] Metabolic, [] Hepatic, [] toxic, [] Neurological, [] Other    Abnormal Nurtitional Status: [] malnurtition (see nutrition note), [ ]underweight: BMI < 19, [] morbid obesity: BMI >40, [] Cachexia    [] Sepsis  [] hypovolemic shock,[] cardiogenic shock, [] hemorrhagic shock, [] neuogenic shock  [] Acute Respiratory Failure  []Cerebral edema, [] Brain compression/ herniation,   [] Functional quadriplegia  [] Acute blood loss anemia

## 2022-12-11 ENCOUNTER — TRANSCRIPTION ENCOUNTER (OUTPATIENT)
Age: 62
End: 2022-12-11

## 2022-12-11 DIAGNOSIS — Z01.818 ENCOUNTER FOR OTHER PREPROCEDURAL EXAMINATION: ICD-10-CM

## 2022-12-11 LAB
ANION GAP SERPL CALC-SCNC: 11 MMOL/L — SIGNIFICANT CHANGE UP (ref 5–17)
APTT BLD: 31.8 SEC — SIGNIFICANT CHANGE UP (ref 27.5–35.5)
BLD GP AB SCN SERPL QL: POSITIVE — SIGNIFICANT CHANGE UP
BUN SERPL-MCNC: 6 MG/DL — LOW (ref 7–23)
CALCIUM SERPL-MCNC: 8.9 MG/DL — SIGNIFICANT CHANGE UP (ref 8.4–10.5)
CHLORIDE SERPL-SCNC: 102 MMOL/L — SIGNIFICANT CHANGE UP (ref 96–108)
CO2 SERPL-SCNC: 24 MMOL/L — SIGNIFICANT CHANGE UP (ref 22–31)
CREAT SERPL-MCNC: 0.2 MG/DL — LOW (ref 0.5–1.3)
EGFR: 134 ML/MIN/1.73M2 — SIGNIFICANT CHANGE UP
GLUCOSE BLDC GLUCOMTR-MCNC: 108 MG/DL — HIGH (ref 70–99)
GLUCOSE BLDC GLUCOMTR-MCNC: 134 MG/DL — HIGH (ref 70–99)
GLUCOSE BLDC GLUCOMTR-MCNC: 195 MG/DL — HIGH (ref 70–99)
GLUCOSE BLDC GLUCOMTR-MCNC: 223 MG/DL — HIGH (ref 70–99)
GLUCOSE SERPL-MCNC: 179 MG/DL — HIGH (ref 70–99)
HCG SERPL-ACNC: 3 MIU/ML — SIGNIFICANT CHANGE UP
HCT VFR BLD CALC: 34.2 % — LOW (ref 34.5–45)
HGB BLD-MCNC: 11.3 G/DL — LOW (ref 11.5–15.5)
INR BLD: 0.98 — SIGNIFICANT CHANGE UP (ref 0.88–1.16)
MAGNESIUM SERPL-MCNC: 1.8 MG/DL — SIGNIFICANT CHANGE UP (ref 1.6–2.6)
MCHC RBC-ENTMCNC: 29.3 PG — SIGNIFICANT CHANGE UP (ref 27–34)
MCHC RBC-ENTMCNC: 33 GM/DL — SIGNIFICANT CHANGE UP (ref 32–36)
MCV RBC AUTO: 88.6 FL — SIGNIFICANT CHANGE UP (ref 80–100)
NRBC # BLD: 0 /100 WBCS — SIGNIFICANT CHANGE UP (ref 0–0)
PHOSPHATE SERPL-MCNC: 4.9 MG/DL — HIGH (ref 2.5–4.5)
PLATELET # BLD AUTO: 287 K/UL — SIGNIFICANT CHANGE UP (ref 150–400)
POTASSIUM SERPL-MCNC: 3.8 MMOL/L — SIGNIFICANT CHANGE UP (ref 3.5–5.3)
POTASSIUM SERPL-SCNC: 3.8 MMOL/L — SIGNIFICANT CHANGE UP (ref 3.5–5.3)
PROTHROM AB SERPL-ACNC: 11.7 SEC — SIGNIFICANT CHANGE UP (ref 10.5–13.4)
RBC # BLD: 3.86 M/UL — SIGNIFICANT CHANGE UP (ref 3.8–5.2)
RBC # FLD: 12.6 % — SIGNIFICANT CHANGE UP (ref 10.3–14.5)
RH IG SCN BLD-IMP: POSITIVE — SIGNIFICANT CHANGE UP
SARS-COV-2 RNA SPEC QL NAA+PROBE: NEGATIVE — SIGNIFICANT CHANGE UP
SODIUM SERPL-SCNC: 137 MMOL/L — SIGNIFICANT CHANGE UP (ref 135–145)
WBC # BLD: 5.65 K/UL — SIGNIFICANT CHANGE UP (ref 3.8–10.5)
WBC # FLD AUTO: 5.65 K/UL — SIGNIFICANT CHANGE UP (ref 3.8–10.5)

## 2022-12-11 PROCEDURE — 86077 PHYS BLOOD BANK SERV XMATCH: CPT

## 2022-12-11 PROCEDURE — 99232 SBSQ HOSP IP/OBS MODERATE 35: CPT

## 2022-12-11 PROCEDURE — 71045 X-RAY EXAM CHEST 1 VIEW: CPT | Mod: 26

## 2022-12-11 RX ORDER — POVIDONE-IODINE 5 %
1 AEROSOL (ML) TOPICAL ONCE
Refills: 0 | Status: COMPLETED | OUTPATIENT
Start: 2022-12-12 | End: 2022-12-12

## 2022-12-11 RX ORDER — CHLORHEXIDINE GLUCONATE 213 G/1000ML
1 SOLUTION TOPICAL EVERY 12 HOURS
Refills: 0 | Status: COMPLETED | OUTPATIENT
Start: 2022-12-11 | End: 2022-12-12

## 2022-12-11 RX ORDER — SODIUM CHLORIDE 9 MG/ML
1000 INJECTION INTRAMUSCULAR; INTRAVENOUS; SUBCUTANEOUS
Refills: 0 | Status: DISCONTINUED | OUTPATIENT
Start: 2022-12-12 | End: 2022-12-13

## 2022-12-11 RX ADMIN — METHOCARBAMOL 500 MILLIGRAM(S): 500 TABLET, FILM COATED ORAL at 02:50

## 2022-12-11 RX ADMIN — OXYCODONE HYDROCHLORIDE 10 MILLIGRAM(S): 5 TABLET ORAL at 07:45

## 2022-12-11 RX ADMIN — DORZOLAMIDE HYDROCHLORIDE 1 DROP(S): 20 SOLUTION/ DROPS OPHTHALMIC at 06:23

## 2022-12-11 RX ADMIN — OXYCODONE HYDROCHLORIDE 10 MILLIGRAM(S): 5 TABLET ORAL at 12:49

## 2022-12-11 RX ADMIN — OXYCODONE HYDROCHLORIDE 10 MILLIGRAM(S): 5 TABLET ORAL at 19:53

## 2022-12-11 RX ADMIN — OXYCODONE HYDROCHLORIDE 10 MILLIGRAM(S): 5 TABLET ORAL at 02:51

## 2022-12-11 RX ADMIN — OXYCODONE HYDROCHLORIDE 10 MILLIGRAM(S): 5 TABLET ORAL at 13:49

## 2022-12-11 RX ADMIN — OXYCODONE HYDROCHLORIDE 10 MILLIGRAM(S): 5 TABLET ORAL at 18:53

## 2022-12-11 RX ADMIN — Medication 75 MILLIGRAM(S): at 06:22

## 2022-12-11 RX ADMIN — METHOCARBAMOL 500 MILLIGRAM(S): 500 TABLET, FILM COATED ORAL at 10:28

## 2022-12-11 RX ADMIN — CHLORHEXIDINE GLUCONATE 1 APPLICATION(S): 213 SOLUTION TOPICAL at 17:54

## 2022-12-11 RX ADMIN — OXYCODONE HYDROCHLORIDE 10 MILLIGRAM(S): 5 TABLET ORAL at 03:50

## 2022-12-11 RX ADMIN — ATORVASTATIN CALCIUM 40 MILLIGRAM(S): 80 TABLET, FILM COATED ORAL at 21:49

## 2022-12-11 RX ADMIN — Medication 1000 MILLIGRAM(S): at 13:59

## 2022-12-11 RX ADMIN — Medication 1000 MILLIGRAM(S): at 07:00

## 2022-12-11 RX ADMIN — DORZOLAMIDE HYDROCHLORIDE 1 DROP(S): 20 SOLUTION/ DROPS OPHTHALMIC at 21:45

## 2022-12-11 RX ADMIN — Medication 1000 MILLIGRAM(S): at 06:23

## 2022-12-11 RX ADMIN — Medication 1000 MILLIGRAM(S): at 21:49

## 2022-12-11 RX ADMIN — SENNA PLUS 2 TABLET(S): 8.6 TABLET ORAL at 21:49

## 2022-12-11 RX ADMIN — Medication 75 MILLIGRAM(S): at 14:00

## 2022-12-11 RX ADMIN — OXYCODONE HYDROCHLORIDE 10 MILLIGRAM(S): 5 TABLET ORAL at 23:05

## 2022-12-11 RX ADMIN — Medication 1000 MILLIGRAM(S): at 22:49

## 2022-12-11 RX ADMIN — BRIMONIDINE TARTRATE 1 DROP(S): 2 SOLUTION/ DROPS OPHTHALMIC at 21:40

## 2022-12-11 RX ADMIN — Medication 75 MILLIGRAM(S): at 21:49

## 2022-12-11 RX ADMIN — DORZOLAMIDE HYDROCHLORIDE 1 DROP(S): 20 SOLUTION/ DROPS OPHTHALMIC at 14:00

## 2022-12-11 RX ADMIN — METHOCARBAMOL 500 MILLIGRAM(S): 500 TABLET, FILM COATED ORAL at 17:50

## 2022-12-11 RX ADMIN — BRIMONIDINE TARTRATE 1 DROP(S): 2 SOLUTION/ DROPS OPHTHALMIC at 06:23

## 2022-12-11 RX ADMIN — BRIMONIDINE TARTRATE 1 DROP(S): 2 SOLUTION/ DROPS OPHTHALMIC at 14:00

## 2022-12-11 RX ADMIN — OXYCODONE HYDROCHLORIDE 10 MILLIGRAM(S): 5 TABLET ORAL at 06:48

## 2022-12-11 RX ADMIN — Medication 2: at 17:50

## 2022-12-11 RX ADMIN — Medication 1000 MILLIGRAM(S): at 14:59

## 2022-12-11 RX ADMIN — LATANOPROST 1 DROP(S): 0.05 SOLUTION/ DROPS OPHTHALMIC; TOPICAL at 21:50

## 2022-12-11 NOTE — PROGRESS NOTE ADULT - SUBJECTIVE AND OBJECTIVE BOX
Surgery:   Consent: Signed by patient vs HCP                   NAME/NUMBER of HCP:     Representative Consent: [  ] Signed by patient vs HCP                                                 [  ] N/A -> only for cerebral angiogram    No Known Allergies        T(C): 36.9 (12-11-22 @ 08:45), Max: 36.9 (12-11-22 @ 08:45)  HR: 60 (12-11-22 @ 08:45) (60 - 68)  BP: 97/60 (12-11-22 @ 08:45) (97/60 - 135/84)  RR: 16 (12-11-22 @ 08:45) (16 - 18)  SpO2: 96% (12-11-22 @ 08:45) (96% - 96%)  Wt(kg): --    EXAM:  General: NAD, pt is comfortably sitting up in bed, on room air  HEENT: EOMI b/l, face symmetric, tongue midline, neck FROM  Cardiovascular: Regular rate and rhythm  Respiratory: nonlabored breathing, normal chest rise  GI: abdomen soft, nontender, nondistended  Neuro: CN II-XII grossly intact, PERRL 3mm briskly reactive   A&Ox3, Pashto speaking, Follows commands.  MCCLENDON x4 spontaneously, b/l UE 5/5, LLE 5/5, RLE HF/KF 2/5, PF/DF 3/5  Extremities: distal pulses 2+ x4    12-11    137  |  102  |  6<L>  ----------------------------<  179<H>  3.8   |  24  |  0.20<L>    Ca    8.9      11 Dec 2022 05:30  Phos  4.9     12-11  Mg     1.8     12-11      CBC Full  -  ( 11 Dec 2022 05:30 )  WBC Count : 5.65 K/uL  RBC Count : 3.86 M/uL  Hemoglobin : 11.3 g/dL  Hematocrit : 34.2 %  Platelet Count - Automated : 287 K/uL  Mean Cell Volume : 88.6 fl  Mean Cell Hemoglobin : 29.3 pg  Mean Cell Hemoglobin Concentration : 33.0 gm/dL  Auto Neutrophil # : x  Auto Lymphocyte # : x  Auto Monocyte # : x  Auto Eosinophil # : x  Auto Basophil # : x  Auto Neutrophil % : x  Auto Lymphocyte % : x  Auto Monocyte % : x  Auto Eosinophil % : x  Auto Basophil % : x    PT/INR - ( 11 Dec 2022 10:57 )   PT: 11.7 sec;   INR: 0.98              Pregnancy test:  Type & Screen (in past 72hrs):     2 Type & Screen within 72 hours if anticipate blood need in OR:  x Y _ N     Blood ordered and on hold for OR:   [ ] No need     [ ] 1u pRBC on hold      [x ] 2u pRBC on hold    COVID swab (in past 48hrs): _ Y  _N    CXR:  EKG:  ECHO:  Medical Clearances:  Other Clearances:     Last dose of antiplatelet/anticoagulation drug:    Implanted Devices (pacemaker, drug pump...etc):  []YES   [] NO                  If yes --> EPS consulted to interrogate device: [ ] YES  [ ] NO                            If yes -->  EPS called to let them know patient going for surgery: [ ] device needs to be turned off                                                                                                                                                 [ ] magnet needs to be placed for surgery                                                                                                                                                [ ] nothing to do per EP, may proceed with bovie use in OR                                       3M nasal swab ordered?  _Y  _N    Cranial surgery: Order written for hair to be shampooed night before surgery and morning before surgery  [] yes   []no  Chlorhexidine Wipes ordered for Neck Down?  _ Y  _ N  (twice a day if 1 day before surgery, daily for 3 days if 3 days prior, daily if in ICU)                 Assessment:    Plan:    Assessment:  Present when checked    []  GCS  E   V  M     Heart Failure: []Acute, [] acute on chronic , []chronic  Heart Failure:  [] Diastolic (HFpEF), [] Systolic (HFrEF), []Combined (HFpEF and HFrEF), [] RHF, [] Pulm HTN, [] Other    [] DERICK, [] ATN, [] AIN, [] other  [] CKD1, [] CKD2, [] CKD 3, [] CKD 4, [] CKD 5, []ESRD    Encephalopathy: [] Metabolic, [] Hepatic, [] toxic, [] Neurological, [] Other    Abnormal Nurtitional Status: [] malnurtition (see nutrition note), [ ]underweight: BMI < 19, [] morbid obesity: BMI >40, [] Cachexia    [] Sepsis  [] hypovolemic shock,[] cardiogenic shock, [] hemorrhagic shock, [] neuogenic shock  [] Acute Respiratory Failure  []Cerebral edema, [] Brain compression/ herniation,   [] Functional quadriplegia  [] Acute blood loss anemia   Surgery: L4-S1 wound exploration, possible CSF leak repair  Consent: Signed by patient                    NAME/NUMBER of HCP:     Representative Consent: [ x ] Signed by patient vs HCP                                                 [  ] N/A -> only for cerebral angiogram    No Known Allergies        T(C): 36.9 (12-11-22 @ 08:45), Max: 36.9 (12-11-22 @ 08:45)  HR: 60 (12-11-22 @ 08:45) (60 - 68)  BP: 97/60 (12-11-22 @ 08:45) (97/60 - 135/84)  RR: 16 (12-11-22 @ 08:45) (16 - 18)  SpO2: 96% (12-11-22 @ 08:45) (96% - 96%)  Wt(kg): --    EXAM:  General: NAD, pt is comfortably sitting up in bed, on room air  HEENT: EOMI b/l, face symmetric, tongue midline, neck FROM  Cardiovascular: Regular rate and rhythm  Respiratory: nonlabored breathing, normal chest rise  GI: abdomen soft, nontender, nondistended  Neuro: CN II-XII grossly intact, PERRL 3mm briskly reactive   A&Ox3, Italian speaking, Follows commands.  MCCLENDON x4 spontaneously, b/l UE 5/5, LLE 5/5, RLE HF/KF 2/5, PF/DF 3/5  Extremities: distal pulses 2+ x4    12-11    137  |  102  |  6<L>  ----------------------------<  179<H>  3.8   |  24  |  0.20<L>    Ca    8.9      11 Dec 2022 05:30  Phos  4.9     12-11  Mg     1.8     12-11      CBC Full  -  ( 11 Dec 2022 05:30 )  WBC Count : 5.65 K/uL  RBC Count : 3.86 M/uL  Hemoglobin : 11.3 g/dL  Hematocrit : 34.2 %  Platelet Count - Automated : 287 K/uL  Mean Cell Volume : 88.6 fl  Mean Cell Hemoglobin : 29.3 pg  Mean Cell Hemoglobin Concentration : 33.0 gm/dL  Auto Neutrophil # : x  Auto Lymphocyte # : x  Auto Monocyte # : x  Auto Eosinophil # : x  Auto Basophil # : x  Auto Neutrophil % : x  Auto Lymphocyte % : x  Auto Monocyte % : x  Auto Eosinophil % : x  Auto Basophil % : x    PT/INR - ( 11 Dec 2022 10:57 )   PT: 11.7 sec;   INR: 0.98              Pregnancy test:  Type & Screen (in past 72hrs):     2 Type & Screen within 72 hours if anticipate blood need in OR:  x Y _ N     Blood ordered and on hold for OR:   [ ] No need     [ ] 1u pRBC on hold      [x ] 2u pRBC on hold    COVID swab (in past 48hrs): _ Y  _N    CXR:  EKG:  ECHO:  Medical Clearances:  Other Clearances:     Last dose of antiplatelet/anticoagulation drug:    Implanted Devices (pacemaker, drug pump...etc):  []YES   [] NO                  If yes --> EPS consulted to interrogate device: [ ] YES  [ ] NO                            If yes -->  EPS called to let them know patient going for surgery: [ ] device needs to be turned off                                                                                                                                                 [ ] magnet needs to be placed for surgery                                                                                                                                                [ ] nothing to do per EP, may proceed with bovie use in OR                                       3M nasal swab ordered?  _Y  _N    Cranial surgery: Order written for hair to be shampooed night before surgery and morning before surgery  [] yes   []no  Chlorhexidine Wipes ordered for Neck Down?  _ Y  _ N  (twice a day if 1 day before surgery, daily for 3 days if 3 days prior, daily if in ICU)                 Assessment:    Plan:    Assessment:  Present when checked    []  GCS  E   V  M     Heart Failure: []Acute, [] acute on chronic , []chronic  Heart Failure:  [] Diastolic (HFpEF), [] Systolic (HFrEF), []Combined (HFpEF and HFrEF), [] RHF, [] Pulm HTN, [] Other    [] DERICK, [] ATN, [] AIN, [] other  [] CKD1, [] CKD2, [] CKD 3, [] CKD 4, [] CKD 5, []ESRD    Encephalopathy: [] Metabolic, [] Hepatic, [] toxic, [] Neurological, [] Other    Abnormal Nurtitional Status: [] malnurtition (see nutrition note), [ ]underweight: BMI < 19, [] morbid obesity: BMI >40, [] Cachexia    [] Sepsis  [] hypovolemic shock,[] cardiogenic shock, [] hemorrhagic shock, [] neuogenic shock  [] Acute Respiratory Failure  []Cerebral edema, [] Brain compression/ herniation,   [] Functional quadriplegia  [] Acute blood loss anemia   Surgery: L4-S1 wound exploration, possible CSF leak repair  Consent: Signed by patient                    NAME/NUMBER of HCP:     Representative Consent: [ x ] Signed by patient vs HCP                                                 [  ] N/A -> only for cerebral angiogram    No Known Allergies        T(C): 36.9 (22 @ 08:45), Max: 36.9 (22 @ 08:45)  HR: 60 (22 @ 08:45) (60 - 68)  BP: 97/60 (22 @ 08:45) (97/60 - 135/84)  RR: 16 (22 @ 08:45) (16 - 18)  SpO2: 96% (22 @ 08:45) (96% - 96%)  Wt(kg): --    EXAM:  General: NAD, pt is comfortably sitting up in bed, on room air  HEENT: EOMI b/l, face symmetric, tongue midline, neck FROM  Cardiovascular: Regular rate and rhythm  Respiratory: nonlabored breathing, normal chest rise  GI: abdomen soft, nontender, nondistended  Neuro: CN II-XII grossly intact, PERRL 3mm briskly reactive   A&Ox3, German speaking, Follows commands.  MCCLENDON x4 spontaneously, b/l UE 5/5, LLE 5/5, RLE HF/KF 2/5, PF/DF 3/5  Extremities: distal pulses 2+ x4        137  |  102  |  6<L>  ----------------------------<  179<H>  3.8   |  24  |  0.20<L>    Ca    8.9      11 Dec 2022 05:30  Phos  4.9       Mg     1.8           CBC Full  -  ( 11 Dec 2022 05:30 )  WBC Count : 5.65 K/uL  RBC Count : 3.86 M/uL  Hemoglobin : 11.3 g/dL  Hematocrit : 34.2 %  Platelet Count - Automated : 287 K/uL  Mean Cell Volume : 88.6 fl  Mean Cell Hemoglobin : 29.3 pg  Mean Cell Hemoglobin Concentration : 33.0 gm/dL  Auto Neutrophil # : x  Auto Lymphocyte # : x  Auto Monocyte # : x  Auto Eosinophil # : x  Auto Basophil # : x  Auto Neutrophil % : x  Auto Lymphocyte % : x  Auto Monocyte % : x  Auto Eosinophil % : x  Auto Basophil % : x    PT/INR - ( 11 Dec 2022 10:57 )   PT: 11.7 sec;   INR: 0.98              Pregnancy test: n/a  Type & Screen (in past 72hrs):     2 Type & Screen within 72 hours if anticipate blood need in OR:  x Y _ N     Blood ordered and on hold for OR:   [ ] No need     [ ] 1u pRBC on hold      [x ] 2u pRBC on hold    COVID swab (in past 48hrs): _ Y  _N (pending)    CXR: pending  EKG:  ECHO:  Medical Clearances: pending  Other Clearances:     Last dose of antiplatelet/anticoagulation dru/10/22 lovenox 40mg    Implanted Devices (pacemaker, drug pump...etc):  []YES   [x] NO                  If yes --> EPS consulted to interrogate device: [ ] YES  [ ] NO                            If yes -->  EPS called to let them know patient going for surgery: [ ] device needs to be turned off                                                                                                                                                 [ ] magnet needs to be placed for surgery                                                                                                                                                [ ] nothing to do per EP, may proceed with bovie use in OR                                       3M nasal swab ordered?  xY  _N    Cranial surgery: Order written for hair to be shampooed night before surgery and morning before surgery  [] yes   [x]no  Chlorhexidine Wipes ordered for Neck Down?  x Y  _ N  (twice a day if 1 day before surgery, daily for 3 days if 3 days prior, daily if in ICU)                 Assessment:  61 yo F PMH polio (during childhood, chronic b/l LE weakness), DM, asthma, HLD, s/p L5-S1 alek laminectomy & TLIF, L4-S2/AI fusion (10/26 w/ Dr Manzanares) presents w/ b/l LE pain since surgery. Recently LLE pain became worse, associated w/ RLE numbness and b/l LE tingling with worsened LBP. Pt ambulates w/ walker at baseline. Pt admitted for pain management and possible intervention for CSF collection at prior surgical bed seen on MRI .     Plan:  NEURO:  - neuro checks/vital signs q4hrs   - pain control PRN: tylenol, oxycodone, robaxin, standing lyrica    - MRI L spine completed ; CSF fluid collection within the laminectomy/foraminotomy bed of L5-S1 w/ severe spinal canal stenosis and thecal sac compression w/ R >L neural foraminal narrowing (non-enhancing).   - pending plan for further intervention     CARDIO:  - SBP goal normotensive   - atorvastatin 40mg QD (home med)   - ASA 81mg QD - on hold     PULM:  - O2 sat goal > 92%, on RA     GI:  - consistent carb diet, NPO for OR  - bowel regimen   - famotidine 20mg BID (home med)     RENAL:  - IVL, voiding  - cont home oxybutinin    ENDO:  - ISS    ID:  - afebrile, no leukocytosis   - no abx at this time     HEME:  - SCDs DVT ppx    Assessment:  Present when checked    []  GCS  E   V  M     Heart Failure: []Acute, [] acute on chronic , []chronic  Heart Failure:  [] Diastolic (HFpEF), [] Systolic (HFrEF), []Combined (HFpEF and HFrEF), [] RHF, [] Pulm HTN, [] Other    [] DERICK, [] ATN, [] AIN, [] other  [] CKD1, [] CKD2, [] CKD 3, [] CKD 4, [] CKD 5, []ESRD    Encephalopathy: [] Metabolic, [] Hepatic, [] toxic, [] Neurological, [] Other    Abnormal Nurtitional Status: [] malnurtition (see nutrition note), [ ]underweight: BMI < 19, [] morbid obesity: BMI >40, [] Cachexia    [] Sepsis  [] hypovolemic shock,[] cardiogenic shock, [] hemorrhagic shock, [] neuogenic shock  [] Acute Respiratory Failure  []Cerebral edema, [] Brain compression/ herniation,   [] Functional quadriplegia  [] Acute blood loss anemia   Surgery: wound exploration, repair of CSF leak, possible lumbar drain placement  Consent: Signed by patient                    NAME/NUMBER of HCP: Veronika Martinez [id number]    Representative Consent: [ x ] Signed by patient vs HCP                                                 [  ] N/A -> only for cerebral angiogram    No Known Allergies        T(C): 36.9 (22 @ 08:45), Max: 36.9 (22 @ 08:45)  HR: 60 (22 @ 08:45) (60 - 68)  BP: 97/60 (22 @ 08:45) (97/60 - 135/84)  RR: 16 (22 @ 08:45) (16 - 18)  SpO2: 96% (22 @ 08:45) (96% - 96%)  Wt(kg): --    EXAM:  General: NAD, pt is comfortably sitting up in bed, on room air  HEENT: EOMI b/l, face symmetric, tongue midline, neck FROM  Cardiovascular: Regular rate and rhythm  Respiratory: nonlabored breathing, normal chest rise  GI: abdomen soft, nontender, nondistended  Neuro: CN II-XII grossly intact, PERRL 3mm briskly reactive   A&Ox3, Setswana speaking, Follows commands.  MCCLENDON x4 spontaneously, b/l UE 5/5, LLE 5/5, RLE HF/KF 2/5, PF/DF 3/5  Extremities: distal pulses 2+ x4        137  |  102  |  6<L>  ----------------------------<  179<H>  3.8   |  24  |  0.20<L>    Ca    8.9      11 Dec 2022 05:30  Phos  4.9       Mg     1.8           CBC Full  -  ( 11 Dec 2022 05:30 )  WBC Count : 5.65 K/uL  RBC Count : 3.86 M/uL  Hemoglobin : 11.3 g/dL  Hematocrit : 34.2 %  Platelet Count - Automated : 287 K/uL  Mean Cell Volume : 88.6 fl  Mean Cell Hemoglobin : 29.3 pg  Mean Cell Hemoglobin Concentration : 33.0 gm/dL  Auto Neutrophil # : x  Auto Lymphocyte # : x  Auto Monocyte # : x  Auto Eosinophil # : x  Auto Basophil # : x  Auto Neutrophil % : x  Auto Lymphocyte % : x  Auto Monocyte % : x  Auto Eosinophil % : x  Auto Basophil % : x    PT/INR - ( 11 Dec 2022 10:57 )   PT: 11.7 sec;   INR: 0.98              Pregnancy test: n/a  Type & Screen (in past 72hrs):     2 Type & Screen within 72 hours if anticipate blood need in OR:  x Y _ N     Blood ordered and on hold for OR:   [ ] No need     [ ] 1u pRBC on hold      [x ] 2u pRBC on hold    COVID swab (in past 48hrs): x Y  _N     CXR:   EKG:  ECHO:  Medical Clearances: cleared by Dr Martin   Other Clearances:     Last dose of antiplatelet/anticoagulation dru/10/22 lovenox 40mg    Implanted Devices (pacemaker, drug pump...etc):  []YES   [x] NO                  If yes --> EPS consulted to interrogate device: [ ] YES  [ ] NO                            If yes -->  EPS called to let them know patient going for surgery: [ ] device needs to be turned off                                                                                                                                                 [ ] magnet needs to be placed for surgery                                                                                                                                                [ ] nothing to do per EP, may proceed with bovie use in OR                                       3M nasal swab ordered?  xY  _N    Cranial surgery: Order written for hair to be shampooed night before surgery and morning before surgery  [] yes   [x]no  Chlorhexidine Wipes ordered for Neck Down?  x Y  _ N  (twice a day if 1 day before surgery, daily for 3 days if 3 days prior, daily if in ICU)                 Assessment:  63 yo F PMH polio (during childhood, chronic b/l LE weakness), DM, asthma, HLD, s/p L5-S1 alek laminectomy & TLIF, L4-S2/AI fusion (10/26 w/ Dr Manzanares) presents w/ b/l LE pain since surgery. Recently LLE pain became worse, associated w/ RLE numbness and b/l LE tingling with worsened LBP. Pt ambulates w/ walker at baseline. Pt admitted for pain management and possible intervention for CSF collection at prior surgical bed seen on MRI .     Plan:  NEURO:  - neuro checks/vital signs q4hrs   - pain control PRN: tylenol, oxycodone, robaxin, standing lyrica    - MRI L spine completed ; CSF fluid collection within the laminectomy/foraminotomy bed of L5-S1 w/ severe spinal canal stenosis and thecal sac compression w/ R >L neural foraminal narrowing (non-enhancing).   - pending plan for further intervention     CARDIO:  - SBP goal normotensive   - atorvastatin 40mg QD (home med)   - ASA 81mg QD - on hold     PULM:  - O2 sat goal > 92%, on RA     GI:  - consistent carb diet, NPO for OR  - bowel regimen   - famotidine 20mg BID (home med)     RENAL:  - IVL, voiding  - cont home oxybutinin    ENDO:  - ISS    ID:  - afebrile, no leukocytosis   - no abx at this time     HEME:  - SCDs DVT ppx    Assessment:  Present when checked    []  GCS  E   V  M     Heart Failure: []Acute, [] acute on chronic , []chronic  Heart Failure:  [] Diastolic (HFpEF), [] Systolic (HFrEF), []Combined (HFpEF and HFrEF), [] RHF, [] Pulm HTN, [] Other    [] DERICK, [] ATN, [] AIN, [] other  [] CKD1, [] CKD2, [] CKD 3, [] CKD 4, [] CKD 5, []ESRD    Encephalopathy: [] Metabolic, [] Hepatic, [] toxic, [] Neurological, [] Other    Abnormal Nurtitional Status: [] malnurtition (see nutrition note), [ ]underweight: BMI < 19, [] morbid obesity: BMI >40, [] Cachexia    [] Sepsis  [] hypovolemic shock,[] cardiogenic shock, [] hemorrhagic shock, [] neuogenic shock  [] Acute Respiratory Failure  []Cerebral edema, [] Brain compression/ herniation,   [] Functional quadriplegia  [] Acute blood loss anemia

## 2022-12-11 NOTE — PATIENT PROFILE ADULT - FUNCTIONAL ASSESSMENT - BASIC MOBILITY 6.
3-calculated by average/Not able to assess (calculate score using [facility] averaging method)

## 2022-12-11 NOTE — PROGRESS NOTE ADULT - SUBJECTIVE AND OBJECTIVE BOX
HPI:  Pt is a 61 yo F PMH polio (during childhood, chronic b/l LE weakness), DM, asthma, HLD, s/p L5-S1 alek laminectomy & TLIF, L4-S2/AI fusion (10/26 w/ Dr Manzanares) presents w/ b/l LE pain since surgery. Pt said she had RLE pain since surgery that has worsened. Recently LLE pain became worse, associated w/ RLE numbness and b/l LE tingling with worsened LBP. Pt ambulates w/ walker at baseline. MRI from 12/6 shows CSF fluid collection within the laminectomy/foraminotomy bed of L5-S1 w/ severe spinal canal stenosis and thecal sac compression w/ R >L neural foraminal narrowing (non-enhancing). Denies bowel/bladder incontinence, recent falls, recent travel or known sick contact.     HOSPITAL COURSE:   12/7: admitted for pain control and possible CSF leak repair  12/8: JAMAL overnight, Pt c/o of LLE pain, pt NPO IR consulted for possible drainage of fluid collection.   12/9: c/o RLE pain o/n.   12/10: JAMAL overnight. pending surgical plan   12/11: JAMAL overnight. pain better controlled. pending OR monday.       Vital Signs Last 24 Hrs  T(C): 36.7 (10 Dec 2022 21:05), Max: 36.8 (10 Dec 2022 10:13)  T(F): 98 (10 Dec 2022 21:05), Max: 98.3 (10 Dec 2022 10:13)  HR: 66 (10 Dec 2022 21:05) (63 - 93)  BP: 130/81 (10 Dec 2022 21:05) (93/58 - 135/84)  BP(mean): --  RR: 16 (10 Dec 2022 21:05) (16 - 19)  SpO2: 96% (10 Dec 2022 21:05) (96% - 97%)    Parameters below as of 10 Dec 2022 21:05  Patient On (Oxygen Delivery Method): room air        I&O's Detail    I&O's Summary      PHYSICAL EXAM:  General: NAD, pt is comfortably sitting up in bed, on room air  HEENT: EOMI b/l, face symmetric, tongue midline, neck FROM  Cardiovascular: Regular rate and rhythm  Respiratory: nonlabored breathing, normal chest rise  GI: abdomen soft, nontender, nondistended  Neuro: CN II-XII grossly intact, PERRL 3mm briskly reactive   A&Ox3, Slovak speaking, Follows commands.  MCCLENDON x4 spontaneously, b/l UE 5/5, LLE 5/5, RLE HF/KF 2/5, PF/DF 3/5  Extremities: distal pulses 2+ x4    TUBES/LINES:  [] CVC  [] A-line  [] Lumbar Drain  [] Ventriculostomy  [] Other    DIET:  [] NPO  [] Mechanical  [] Tube feeds    LABS:                        10.7   5.06  )-----------( 254      ( 10 Dec 2022 05:30 )             32.1     12-10    138  |  104  |  10  ----------------------------<  277<H>  3.8   |  25  |  0.22<L>    Ca    8.5      10 Dec 2022 05:30  Phos  3.7     12-10  Mg     1.9     12-10              CAPILLARY BLOOD GLUCOSE      POCT Blood Glucose.: 336 mg/dL (10 Dec 2022 22:03)  POCT Blood Glucose.: 195 mg/dL (10 Dec 2022 17:41)  POCT Blood Glucose.: 245 mg/dL (10 Dec 2022 11:51)  POCT Blood Glucose.: 141 mg/dL (10 Dec 2022 07:47)      Drug Levels: [] N/A    CSF Analysis: [] N/A      Allergies    No Known Allergies    Intolerances      MEDICATIONS:  Antibiotics:    Neuro:  acetaminophen     Tablet .. 1000 milliGRAM(s) Oral every 8 hours  methocarbamol 500 milliGRAM(s) Oral every 8 hours  oxyCODONE    IR 5 milliGRAM(s) Oral every 4 hours PRN  oxyCODONE    IR 10 milliGRAM(s) Oral every 4 hours PRN  pregabalin 75 milliGRAM(s) Oral every 8 hours    Anticoagulation:  enoxaparin Injectable 40 milliGRAM(s) SubCutaneous every 24 hours    OTHER:  atorvastatin 40 milliGRAM(s) Oral at bedtime  bisacodyl 5 milliGRAM(s) Oral daily PRN  brimonidine 0.2% Ophthalmic Solution 1 Drop(s) Both EYES every 8 hours  dorzolamide 2% Ophthalmic Solution 1 Drop(s) Both EYES every 8 hours  famotidine    Tablet 20 milliGRAM(s) Oral two times a day PRN  insulin lispro (ADMELOG) corrective regimen sliding scale   SubCutaneous three times a day before meals  latanoprost 0.005% Ophthalmic Solution 1 Drop(s) Both EYES at bedtime  oxybutynin 5 milliGRAM(s) Oral two times a day PRN  senna 2 Tablet(s) Oral at bedtime  simethicone 80 milliGRAM(s) Chew every 8 hours PRN    IVF:    CULTURES:    RADIOLOGY & ADDITIONAL TESTS:      ASSESSMENT:  61 yo F PMH polio (during childhood, chronic b/l LE weakness), DM, asthma, HLD, s/p L5-S1 alek laminectomy & TLIF, L4-S2/AI fusion (10/26 w/ Dr Manzanares) presents w/ b/l LE pain since surgery. Recently LLE pain became worse, associated w/ RLE numbness and b/l LE tingling with worsened LBP. Pt ambulates w/ walker at baseline. Pt admitted for pain management and possible intervention for CSF collection at prior surgical bed seen on MRI 12/6.     CSF LEAK    Acute poliomyelitis, unspecified    Acute posthemorrhagic anemia    ADVERSE EFFECT OF GLUCOCORT/SYNTH ANALOG, INIT    Anxiety disorder, unspecified    Arthrodesis status    Cauda equina syndrome    Congenital spondylolisthesis    COVID-19    Disease of spinal cord, unspecified    Encounter for other preprocedural examination    Encounter for prophylactic measures, unspecified    Essential (primary) hypertension    Foot drop, unspecified foot    Long term (current) use of oral hypoglycemic drugs    Migraine, unspecified, not intractable, without status migrainosus    Nutritional anemia, unspecified    Orthostatic hypotension    OTH IV DISC DISPLACEMENT LUMBAR RGN    Other acute postprocedural pain    Other idiopathic scoliosis, site unspecified    Other intervertebral disc degeneration, lumbar region    Other osteoporosis without current pathological fracture    Other specified disorders of bone density and structure, unspecified site    Other specified symptoms and signs involving the circulatory and respiratory systems    Other spondylosis with myelopathy, thoracic region    Other spondylosis with radiculopathy, lumbar region    Personal history of COVID-19    Personal history of other diseases of the digestive system    Personal history of other diseases of the musculoskeletal system and connective tissue    Personal history of other specified conditions    Polyneuropathy, unspecified    Polyp of colon    Postpolio syndrome    Pure hypercholesterolemia, unspecified    Radiculopathy, lumbar region    Radiculopathy, lumbosacral region    Repeated falls    Spinal stenosis, lumbar region without neurogenic claudication    Spondylolisthesis, lumbosacral region    Spondylolysis, lumbar region    Spondylosis without myelopathy or radiculopathy, cervical region    Spondylosis without myelopathy or radiculopathy, lumbar region    Spondylosis without myelopathy or radiculopathy, lumbosacral region    Trigger finger, unspecified finger    Type 2 diabetes mellitus without complications    Unspecified abdominal pain    Unspecified asthma, uncomplicated    Unspecified cord compression    Unspecified fracture of unspecified lumbar vertebra, subsequent encounter for fracture with nonunion    Unspecified fracture of unspecified patella, initial encounter for closed fracture    Unspecified osteoarthritis, unspecified site    Handoff    MEWS Score    Poliomyelitis    DM (diabetes mellitus)    Asthma    HTN (hypertension)    Hypercholesterolemia    Colonic polyp    Osteoarthritis    Trigger finger    Patella fracture    COVID-19 virus infection    History of colitis    History of urinary incontinence    Spondylolisthesis, lumbar region    Lumbar spondylosis    Lumbar spondylosis    DM (diabetes mellitus)    Hypercholesterolemia    HTN (hypertension)    Poliomyelitis    Prophylactic measure    Anemia    History of shoulder surgery    DM (diabetes mellitus)      PLAN:  NEURO:  - neuro checks/vital signs q4hrs   - pain control PRN: tylenol, oxycodone, robaxin, standing lyrica    - MRI L spine completed 12/6; CSF fluid collection within the laminectomy/foraminotomy bed of L5-S1 w/ severe spinal canal stenosis and thecal sac compression w/ R >L neural foraminal narrowing (non-enhancing).   - pending plan for further intervention     CARDIO:  - SBP goal normotensive   - atorvastatin 40mg QD (home med)   - ASA 81mg QD - on hold     PULM:  - O2 sat goal > 92%, on RA     GI:  - consistent carb diet  - bowel regimen   - famotidine 20mg BID (home med)     RENAL:  - IVL, voiding  - cont home oxybutinin    ENDO:  - ISS    ID:  - afebrile, no leukocytosis   - no abx at this time     HEME:  - SCDs/SQL DVT ppx    Dispo: regional status, full code    Assessment and plan d/w Dr. Manzanares           Assessment:  Present when checked    []  GCS  E   V  M     Heart Failure: []Acute, [] acute on chronic , []chronic  Heart Failure:  [] Diastolic (HFpEF), [] Systolic (HFrEF), []Combined (HFpEF and HFrEF), [] RHF, [] Pulm HTN, [] Other    [] DERICK, [] ATN, [] AIN, [] other  [] CKD1, [] CKD2, [] CKD 3, [] CKD 4, [] CKD 5, []ESRD    Encephalopathy: [] Metabolic, [] Hepatic, [] toxic, [] Neurological, [] Other    Abnormal Nurtitional Status: [] malnurtition (see nutrition note), [ ]underweight: BMI < 19, [] morbid obesity: BMI >40, [] Cachexia    [] Sepsis  [] hypovolemic shock,[] cardiogenic shock, [] hemorrhagic shock, [] neuogenic shock  [] Acute Respiratory Failure  []Cerebral edema, [] Brain compression/ herniation,   [] Functional quadriplegia  [] Acute blood loss anemia

## 2022-12-11 NOTE — PROGRESS NOTE ADULT - PROBLEM SELECTOR PLAN 1
s/p L4-S2/AI fusion (10/26) now with fluid collection seen on MRI  - plan for OR on Monday for drainage  - pain control per neurosurgery team  - PT post op.

## 2022-12-12 ENCOUNTER — TRANSCRIPTION ENCOUNTER (OUTPATIENT)
Age: 62
End: 2022-12-12

## 2022-12-12 LAB
ANION GAP SERPL CALC-SCNC: 10 MMOL/L — SIGNIFICANT CHANGE UP (ref 5–17)
BUN SERPL-MCNC: 8 MG/DL — SIGNIFICANT CHANGE UP (ref 7–23)
CALCIUM SERPL-MCNC: 8.9 MG/DL — SIGNIFICANT CHANGE UP (ref 8.4–10.5)
CHLORIDE SERPL-SCNC: 102 MMOL/L — SIGNIFICANT CHANGE UP (ref 96–108)
CO2 SERPL-SCNC: 26 MMOL/L — SIGNIFICANT CHANGE UP (ref 22–31)
CREAT SERPL-MCNC: 0.21 MG/DL — LOW (ref 0.5–1.3)
EGFR: 132 ML/MIN/1.73M2 — SIGNIFICANT CHANGE UP
GLUCOSE BLDC GLUCOMTR-MCNC: 123 MG/DL — HIGH (ref 70–99)
GLUCOSE BLDC GLUCOMTR-MCNC: 123 MG/DL — HIGH (ref 70–99)
GLUCOSE BLDC GLUCOMTR-MCNC: 141 MG/DL — HIGH (ref 70–99)
GLUCOSE SERPL-MCNC: 151 MG/DL — HIGH (ref 70–99)
HCT VFR BLD CALC: 33.4 % — LOW (ref 34.5–45)
HGB BLD-MCNC: 11.2 G/DL — LOW (ref 11.5–15.5)
MAGNESIUM SERPL-MCNC: 1.9 MG/DL — SIGNIFICANT CHANGE UP (ref 1.6–2.6)
MCHC RBC-ENTMCNC: 29.6 PG — SIGNIFICANT CHANGE UP (ref 27–34)
MCHC RBC-ENTMCNC: 33.5 GM/DL — SIGNIFICANT CHANGE UP (ref 32–36)
MCV RBC AUTO: 88.4 FL — SIGNIFICANT CHANGE UP (ref 80–100)
NRBC # BLD: 0 /100 WBCS — SIGNIFICANT CHANGE UP (ref 0–0)
PHOSPHATE SERPL-MCNC: 4.7 MG/DL — HIGH (ref 2.5–4.5)
PLATELET # BLD AUTO: 273 K/UL — SIGNIFICANT CHANGE UP (ref 150–400)
POTASSIUM SERPL-MCNC: 4 MMOL/L — SIGNIFICANT CHANGE UP (ref 3.5–5.3)
POTASSIUM SERPL-SCNC: 4 MMOL/L — SIGNIFICANT CHANGE UP (ref 3.5–5.3)
RBC # BLD: 3.78 M/UL — LOW (ref 3.8–5.2)
RBC # FLD: 12.5 % — SIGNIFICANT CHANGE UP (ref 10.3–14.5)
SODIUM SERPL-SCNC: 138 MMOL/L — SIGNIFICANT CHANGE UP (ref 135–145)
WBC # BLD: 5.77 K/UL — SIGNIFICANT CHANGE UP (ref 3.8–10.5)
WBC # FLD AUTO: 5.77 K/UL — SIGNIFICANT CHANGE UP (ref 3.8–10.5)

## 2022-12-12 PROCEDURE — 99232 SBSQ HOSP IP/OBS MODERATE 35: CPT

## 2022-12-12 PROCEDURE — 10140 I&D HMTMA SEROMA/FLUID COLLJ: CPT | Mod: 78

## 2022-12-12 DEVICE — SURGICEL 4 X 8": Type: IMPLANTABLE DEVICE | Status: FUNCTIONAL

## 2022-12-12 RX ORDER — CELECOXIB 200 MG/1
200 CAPSULE ORAL ONCE
Refills: 0 | Status: COMPLETED | OUTPATIENT
Start: 2022-12-12 | End: 2022-12-12

## 2022-12-12 RX ORDER — OXYCODONE HYDROCHLORIDE 5 MG/1
10 TABLET ORAL EVERY 4 HOURS
Refills: 0 | Status: DISCONTINUED | OUTPATIENT
Start: 2022-12-12 | End: 2022-12-16

## 2022-12-12 RX ORDER — HYDROMORPHONE HYDROCHLORIDE 2 MG/ML
0.5 INJECTION INTRAMUSCULAR; INTRAVENOUS; SUBCUTANEOUS EVERY 4 HOURS
Refills: 0 | Status: DISCONTINUED | OUTPATIENT
Start: 2022-12-12 | End: 2022-12-13

## 2022-12-12 RX ORDER — APREPITANT 80 MG/1
40 CAPSULE ORAL ONCE
Refills: 0 | Status: COMPLETED | OUTPATIENT
Start: 2022-12-12 | End: 2022-12-12

## 2022-12-12 RX ORDER — CEFAZOLIN SODIUM 1 G
2000 VIAL (EA) INJECTION EVERY 8 HOURS
Refills: 0 | Status: COMPLETED | OUTPATIENT
Start: 2022-12-12 | End: 2022-12-13

## 2022-12-12 RX ORDER — METHOCARBAMOL 500 MG/1
500 TABLET, FILM COATED ORAL EVERY 8 HOURS
Refills: 0 | Status: DISCONTINUED | OUTPATIENT
Start: 2022-12-12 | End: 2022-12-13

## 2022-12-12 RX ORDER — ACETAMINOPHEN 500 MG
1000 TABLET ORAL EVERY 8 HOURS
Refills: 0 | Status: DISCONTINUED | OUTPATIENT
Start: 2022-12-12 | End: 2022-12-16

## 2022-12-12 RX ORDER — SENNA PLUS 8.6 MG/1
2 TABLET ORAL AT BEDTIME
Refills: 0 | Status: DISCONTINUED | OUTPATIENT
Start: 2022-12-12 | End: 2022-12-16

## 2022-12-12 RX ORDER — ONDANSETRON 8 MG/1
4 TABLET, FILM COATED ORAL EVERY 6 HOURS
Refills: 0 | Status: DISCONTINUED | OUTPATIENT
Start: 2022-12-12 | End: 2022-12-16

## 2022-12-12 RX ORDER — HYDROMORPHONE HYDROCHLORIDE 2 MG/ML
0.5 INJECTION INTRAMUSCULAR; INTRAVENOUS; SUBCUTANEOUS ONCE
Refills: 0 | Status: DISCONTINUED | OUTPATIENT
Start: 2022-12-12 | End: 2022-12-12

## 2022-12-12 RX ORDER — OXYCODONE HYDROCHLORIDE 5 MG/1
5 TABLET ORAL EVERY 4 HOURS
Refills: 0 | Status: DISCONTINUED | OUTPATIENT
Start: 2022-12-12 | End: 2022-12-16

## 2022-12-12 RX ADMIN — METHOCARBAMOL 500 MILLIGRAM(S): 500 TABLET, FILM COATED ORAL at 03:24

## 2022-12-12 RX ADMIN — DORZOLAMIDE HYDROCHLORIDE 1 DROP(S): 20 SOLUTION/ DROPS OPHTHALMIC at 12:24

## 2022-12-12 RX ADMIN — HYDROMORPHONE HYDROCHLORIDE 0.5 MILLIGRAM(S): 2 INJECTION INTRAMUSCULAR; INTRAVENOUS; SUBCUTANEOUS at 18:44

## 2022-12-12 RX ADMIN — ATORVASTATIN CALCIUM 40 MILLIGRAM(S): 80 TABLET, FILM COATED ORAL at 22:23

## 2022-12-12 RX ADMIN — Medication 100 MILLIGRAM(S): at 22:23

## 2022-12-12 RX ADMIN — Medication 1000 MILLIGRAM(S): at 12:24

## 2022-12-12 RX ADMIN — Medication 1 APPLICATION(S): at 13:32

## 2022-12-12 RX ADMIN — Medication 1000 MILLIGRAM(S): at 23:33

## 2022-12-12 RX ADMIN — DORZOLAMIDE HYDROCHLORIDE 1 DROP(S): 20 SOLUTION/ DROPS OPHTHALMIC at 05:42

## 2022-12-12 RX ADMIN — OXYCODONE HYDROCHLORIDE 10 MILLIGRAM(S): 5 TABLET ORAL at 22:23

## 2022-12-12 RX ADMIN — Medication 1000 MILLIGRAM(S): at 13:28

## 2022-12-12 RX ADMIN — Medication 75 MILLIGRAM(S): at 13:19

## 2022-12-12 RX ADMIN — Medication 75 MILLIGRAM(S): at 05:32

## 2022-12-12 RX ADMIN — Medication 1000 MILLIGRAM(S): at 06:30

## 2022-12-12 RX ADMIN — DORZOLAMIDE HYDROCHLORIDE 1 DROP(S): 20 SOLUTION/ DROPS OPHTHALMIC at 22:45

## 2022-12-12 RX ADMIN — OXYCODONE HYDROCHLORIDE 10 MILLIGRAM(S): 5 TABLET ORAL at 23:30

## 2022-12-12 RX ADMIN — SODIUM CHLORIDE 75 MILLILITER(S): 9 INJECTION INTRAMUSCULAR; INTRAVENOUS; SUBCUTANEOUS at 00:01

## 2022-12-12 RX ADMIN — CHLORHEXIDINE GLUCONATE 1 APPLICATION(S): 213 SOLUTION TOPICAL at 07:42

## 2022-12-12 RX ADMIN — Medication 1000 MILLIGRAM(S): at 22:23

## 2022-12-12 RX ADMIN — APREPITANT 40 MILLIGRAM(S): 80 CAPSULE ORAL at 13:19

## 2022-12-12 RX ADMIN — OXYCODONE HYDROCHLORIDE 10 MILLIGRAM(S): 5 TABLET ORAL at 00:00

## 2022-12-12 RX ADMIN — Medication 1000 MILLIGRAM(S): at 05:32

## 2022-12-12 RX ADMIN — BRIMONIDINE TARTRATE 1 DROP(S): 2 SOLUTION/ DROPS OPHTHALMIC at 05:35

## 2022-12-12 RX ADMIN — METHOCARBAMOL 500 MILLIGRAM(S): 500 TABLET, FILM COATED ORAL at 22:23

## 2022-12-12 RX ADMIN — OXYCODONE HYDROCHLORIDE 10 MILLIGRAM(S): 5 TABLET ORAL at 03:33

## 2022-12-12 RX ADMIN — BRIMONIDINE TARTRATE 1 DROP(S): 2 SOLUTION/ DROPS OPHTHALMIC at 12:27

## 2022-12-12 RX ADMIN — CELECOXIB 200 MILLIGRAM(S): 200 CAPSULE ORAL at 14:18

## 2022-12-12 RX ADMIN — OXYCODONE HYDROCHLORIDE 10 MILLIGRAM(S): 5 TABLET ORAL at 04:30

## 2022-12-12 RX ADMIN — OXYCODONE HYDROCHLORIDE 10 MILLIGRAM(S): 5 TABLET ORAL at 10:46

## 2022-12-12 RX ADMIN — BRIMONIDINE TARTRATE 1 DROP(S): 2 SOLUTION/ DROPS OPHTHALMIC at 22:45

## 2022-12-12 RX ADMIN — HYDROMORPHONE HYDROCHLORIDE 0.5 MILLIGRAM(S): 2 INJECTION INTRAMUSCULAR; INTRAVENOUS; SUBCUTANEOUS at 18:01

## 2022-12-12 RX ADMIN — OXYCODONE HYDROCHLORIDE 10 MILLIGRAM(S): 5 TABLET ORAL at 11:46

## 2022-12-12 RX ADMIN — CELECOXIB 200 MILLIGRAM(S): 200 CAPSULE ORAL at 13:18

## 2022-12-12 RX ADMIN — LATANOPROST 1 DROP(S): 0.05 SOLUTION/ DROPS OPHTHALMIC; TOPICAL at 22:45

## 2022-12-12 NOTE — PROGRESS NOTE ADULT - PROBLEM SELECTOR PLAN 1
s/p L4-S2/AI fusion (10/26) now with fluid collection seen on MRI  - plan for OR today for drainage  - pain control per neurosurgery team  - PT post op.

## 2022-12-12 NOTE — DIETITIAN INITIAL EVALUATION ADULT - OTHER INFO
61 yo F PMH polio (during childhood, chronic b/l LE weakness), DM, asthma, HLD, s/p L5-S1 alek laminectomy & TLIF, L4-S2/AI fusion (10/26 w/ Dr Manzanares) presents w/ b/l LE pain since surgery. Recently LLE pain became worse, associated w/ RLE numbness and b/l LE tingling with worsened LBP. Pt ambulates w/ walker at baseline. Pt admitted for pain management and possible intervention for CSF collection at prior surgical bed seen on MRI 12/6.     Pt seen in room for nutrition assessment. Previously on consistent carbohydrate diet, now NPO for OR today. No reports of appetite changes or weight changes PTA. Per EMR wts, stable at ~114lbs. No cultural, ethnic, Christianity food preferences noted. NKFA. RD discussed resuming diet when medically able, pt expressed understanding. Pt complaining of pain during visit, on pain and bowel regimen. Last BM 12/10. Please see full nutrition recommendations below. Will continue to follow per RD protocol.

## 2022-12-12 NOTE — PRE-ANESTHESIA EVALUATION ADULT - NSPROPOSEDPROCEDFT_GEN_ALL_CORE
exploration lumbar wound Simple: Patient demonstrates quick and easy understanding/Verbalized Understanding

## 2022-12-12 NOTE — DIETITIAN INITIAL EVALUATION ADULT - PERTINENT MEDS FT
MEDICATIONS  (STANDING):  acetaminophen     Tablet .. 1000 milliGRAM(s) Oral every 8 hours  atorvastatin 40 milliGRAM(s) Oral at bedtime  brimonidine 0.2% Ophthalmic Solution 1 Drop(s) Both EYES every 8 hours  dorzolamide 2% Ophthalmic Solution 1 Drop(s) Both EYES every 8 hours  insulin lispro (ADMELOG) corrective regimen sliding scale   SubCutaneous three times a day before meals  latanoprost 0.005% Ophthalmic Solution 1 Drop(s) Both EYES at bedtime  methocarbamol 500 milliGRAM(s) Oral every 8 hours  pregabalin 75 milliGRAM(s) Oral every 8 hours  senna 2 Tablet(s) Oral at bedtime  sodium chloride 0.9%. 1000 milliLiter(s) (75 mL/Hr) IV Continuous <Continuous>    MEDICATIONS  (PRN):  bisacodyl 5 milliGRAM(s) Oral daily PRN Constipation  famotidine    Tablet 20 milliGRAM(s) Oral two times a day PRN Dyspepsia  oxybutynin 5 milliGRAM(s) Oral two times a day PRN Bladder spasms  oxyCODONE    IR 5 milliGRAM(s) Oral every 4 hours PRN Moderate Pain (4 - 6)  oxyCODONE    IR 10 milliGRAM(s) Oral every 4 hours PRN Severe Pain (7 - 10)  simethicone 80 milliGRAM(s) Chew every 8 hours PRN Gas

## 2022-12-12 NOTE — PRE-ANESTHESIA EVALUATION ADULT - NSANTHPEFT_GEN_ALL_CORE
General: Appearance is consistent with chronological age. No abnormal facies.  Airway:  See Mallampati score  EENT: Anicteric sclera; oropharynx clear, moist mucus membranes  Cardiovascular:  Regular rate and rhythm  Respiratory: Unlabored breathing  Neurological: Awake and alert, moves all extremities  Constitutional: MET>4, recovering from surgery

## 2022-12-12 NOTE — PROGRESS NOTE ADULT - SUBJECTIVE AND OBJECTIVE BOX
SUBJECTIVE:  Patient seen and examined at bedside.  Sitting up at bedside watching tv.  Low back pain rated as 10/10 prior to pain meds, has improved slightly since AM medications    ROS:  Denies fevers, chills, vision changes, neck pain, cough, SOB, chest pain, Abdominal pain, N/V, dysuria or new rash.  All other ROS negative except as above    Vital Signs Last 12 Hrs  T(F): 97.4 (12-12-22 @ 09:07), Max: 97.8 (12-12-22 @ 05:30)  HR: 62 (12-12-22 @ 09:07) (62 - 63)  BP: 116/71 (12-12-22 @ 09:07) (116/71 - 137/83)  BP(mean): --  RR: 17 (12-12-22 @ 09:07) (16 - 17)  SpO2: 94% (12-12-22 @ 09:07) (94% - 95%)  I&O's Summary    11 Dec 2022 07:01  -  12 Dec 2022 07:00  --------------------------------------------------------  IN: 0 mL / OUT: 800 mL / NET: -800 mL        PHYSICAL EXAM:  Constitutional: NAD, comfortable in bed.  HEENT: PERRLA, EOMI, no conjunctival pallor or scleral icterus, MMM  Neck: Supple, no JVD  Respiratory: CTA B/L. No w/r/r.   Cardiovascular: RRR, normal S1 and S2, no m/r/g.   Gastrointestinal: +BS, soft NTND, no guarding or rebound tenderness, no palpable masses   Extremities: wwp; no cyanosis, clubbing or edema.   Vascular: Pulses equal and strong throughout.   Neurological: AAOx3, no CN deficits, sensation intact throughout, b/l LE weakness  Skin: No gross skin abnormalities or rashes, low back scars well healed        LABS:                        11.2   5.77  )-----------( 273      ( 12 Dec 2022 07:53 )             33.4     12-12    138  |  102  |  8   ----------------------------<  151<H>  4.0   |  26  |  0.21<L>    Ca    8.9      12 Dec 2022 07:53  Phos  4.7     12-12  Mg     1.9     12-12      PT/INR - ( 11 Dec 2022 10:57 )   PT: 11.7 sec;   INR: 0.98          PTT - ( 11 Dec 2022 14:00 )  PTT:31.8 sec        RADIOLOGY & ADDITIONAL TESTS:  No new imaging    MEDICATIONS  (STANDING):  acetaminophen     Tablet .. 1000 milliGRAM(s) Oral every 8 hours  atorvastatin 40 milliGRAM(s) Oral at bedtime  brimonidine 0.2% Ophthalmic Solution 1 Drop(s) Both EYES every 8 hours  dorzolamide 2% Ophthalmic Solution 1 Drop(s) Both EYES every 8 hours  insulin lispro (ADMELOG) corrective regimen sliding scale   SubCutaneous three times a day before meals  latanoprost 0.005% Ophthalmic Solution 1 Drop(s) Both EYES at bedtime  methocarbamol 500 milliGRAM(s) Oral every 8 hours  pregabalin 75 milliGRAM(s) Oral every 8 hours  senna 2 Tablet(s) Oral at bedtime  sodium chloride 0.9%. 1000 milliLiter(s) (75 mL/Hr) IV Continuous <Continuous>    MEDICATIONS  (PRN):  bisacodyl 5 milliGRAM(s) Oral daily PRN Constipation  famotidine    Tablet 20 milliGRAM(s) Oral two times a day PRN Dyspepsia  oxybutynin 5 milliGRAM(s) Oral two times a day PRN Bladder spasms  oxyCODONE    IR 5 milliGRAM(s) Oral every 4 hours PRN Moderate Pain (4 - 6)  oxyCODONE    IR 10 milliGRAM(s) Oral every 4 hours PRN Severe Pain (7 - 10)  simethicone 80 milliGRAM(s) Chew every 8 hours PRN Gas

## 2022-12-12 NOTE — DIETITIAN INITIAL EVALUATION ADULT - ADD RECOMMEND
1. Resume consistent carbohydrate diet when medically able  2. Pain and bowel regimens per team  3. Provide education prn

## 2022-12-12 NOTE — PROGRESS NOTE ADULT - SUBJECTIVE AND OBJECTIVE BOX
NEUROSURGERY POST OP NOTE:    POD# 0 S/P lumbar wound revision     S: patient c/o back pain, given dilaudid reports mild improvement of pain.       T(C): 36.8 (12-12-22 @ 17:21), Max: 36.8 (12-12-22 @ 17:21)  HR: 55 (12-12-22 @ 18:06) (54 - 73)  BP: 146/75 (12-12-22 @ 18:06) (116/71 - 146/75)  RR: 18 (12-12-22 @ 18:06) (11 - 37)  SpO2: 99% (12-12-22 @ 18:06) (94% - 100%)      12-11-22 @ 07:01  -  12-12-22 @ 07:00  --------------------------------------------------------  IN: 0 mL / OUT: 800 mL / NET: -800 mL        acetaminophen     Tablet .. 1000 milliGRAM(s) Oral every 8 hours  acetaminophen     Tablet .. 1000 milliGRAM(s) Oral every 8 hours  atorvastatin 40 milliGRAM(s) Oral at bedtime  bisacodyl 5 milliGRAM(s) Oral every 12 hours PRN  bisacodyl 5 milliGRAM(s) Oral daily PRN  brimonidine 0.2% Ophthalmic Solution 1 Drop(s) Both EYES every 8 hours  ceFAZolin   IVPB 2000 milliGRAM(s) IV Intermittent every 8 hours  dorzolamide 2% Ophthalmic Solution 1 Drop(s) Both EYES every 8 hours  famotidine    Tablet 20 milliGRAM(s) Oral two times a day PRN  insulin lispro (ADMELOG) corrective regimen sliding scale   SubCutaneous three times a day before meals  latanoprost 0.005% Ophthalmic Solution 1 Drop(s) Both EYES at bedtime  methocarbamol 500 milliGRAM(s) Oral every 8 hours  methocarbamol 500 milliGRAM(s) Oral every 8 hours  ondansetron   Disintegrating Tablet 4 milliGRAM(s) Oral every 6 hours  oxybutynin 5 milliGRAM(s) Oral two times a day PRN  oxyCODONE    IR 5 milliGRAM(s) Oral every 4 hours PRN  oxyCODONE    IR 5 milliGRAM(s) Oral every 4 hours PRN  oxyCODONE    IR 10 milliGRAM(s) Oral every 4 hours PRN  oxyCODONE    IR 10 milliGRAM(s) Oral every 4 hours PRN  pregabalin 50 milliGRAM(s) Oral three times a day  pregabalin 75 milliGRAM(s) Oral every 8 hours  senna 2 Tablet(s) Oral at bedtime  senna 2 Tablet(s) Oral at bedtime  simethicone 80 milliGRAM(s) Chew every 8 hours PRN  sodium chloride 0.9%. 1000 milliLiter(s) IV Continuous <Continuous>      RADIOLOGY:   < from: Xray Lumbar Spine AP + Lateral (12.06.22 @ 09:17) >  Impression:  Status post fusion of L4 through the pelvis as above.      < from: MR Lumbar Spine w/wo IV Cont (12.06.22 @ 08:54) >  IMPRESSION:    Status post fusion spanning L4 through the pelvis. Large predominantly   CSF intensity fluid collection within the laminectomy/foraminotomy bed at   L5-S1 with resultant severe spinal canal stenosis an thecal sac   compression with right greater than left neural foraminal narrowing. The   fluid collection does not demonstrate significant rim of enhancement.   These findings may represent pseudomeningocele and CSFleak cannot be   excluded. Alternative considerations would include postoperative seroma.        Exam:  General: NAD, pt is comfortably sitting up in bed, on room air  HEENT: EOMI b/l, face symmetric, tongue midline, neck FROM  Cardiovascular: Regular rate and rhythm  Respiratory: nonlabored breathing, normal chest rise  GI: abdomen soft, nontender, nondistended  Neuro: CN II-XII grossly intact, PERRL 3mm briskly reactive   A&Ox3, No aphasia, speech clear, no dysmetria, no pronator drift. Follows commands.  MCCLENDON x4 spontaneously, UE 5/5, b/l HF/KF 2/5, LLE DF/PF 4+/5, RLE DF/PF 4-/5  Extremities: distal pulses 2+ x4  Drain: HMVx1, JPx1      Assessment:   63 yo F PMH polio (during childhood, chronic b/l LE weakness), DM, asthma, HLD, s/p L5-S1 alek laminectomy & TLIF, L4-S2/AI fusion (10/26 w/ Dr Manzanares) presents w/ b/l LE pain since surgery. Recently LLE pain became worse, associated w/ RLE numbness and b/l LE tingling with worsened LBP. Pt ambulates w/ walker at baseline. Pt admitted for pain management and possible intervention for CSF collection at prior surgical bed seen on MRI 12/6. s/p lumbar wound revision 12/12    Plan:  NEURO:  - neuro checks/vital signs q4hrs   - pain control PRN: tylenol, oxycodone, robaxin, standing lyrica    - MRI L spine completed 12/6; CSF fluid collection within the laminectomy/foraminotomy bed of L5-S1 w/ severe spinal canal stenosis and thecal sac compression w/ R >L neural foraminal narrowing (non-enhancing).   - HMVx1, JPx1    CARDIO:  - SBP goal normotensive   - atorvastatin 40mg QD (home med)   - ASA 81mg QD - on hold     PULM:  - NC PRN, IS    GI:  - ADAT  - bowel regimen   - famotidine 20mg BID (home med)     RENAL:  - IVF until tolerating PO   - cont home oxybutinin    ENDO:  - ISS    ID:  - afebrile, no leukocytosis   - postop Ancef  - f/u OR cx     HEME:  - SCDs DVT ppx    Dispo: regional status, full code, dispo pending further work-up     Assessment and plan d/w Dr. Manzanares

## 2022-12-12 NOTE — PRE-OP CHECKLIST - SELECT TESTS ORDERED
BMP/CBC/CMP/PT/PTT/INR/COVID-19 BMP/CBC/CMP/PT/PTT/INR/Type and Screen/COVID-19 BMP/CBC/CMP/PT/PTT/INR/Type and Screen/EKG/POCT Blood Glucose/COVID-19

## 2022-12-12 NOTE — DIETITIAN INITIAL EVALUATION ADULT - PERTINENT LABORATORY DATA
12-12    138  |  102  |  8   ----------------------------<  151<H>  4.0   |  26  |  0.21<L>    Ca    8.9      12 Dec 2022 07:53  Phos  4.7     12-12  Mg     1.9     12-12    POCT Blood Glucose.: 123 mg/dL (12-12-22 @ 12:15)  A1C with Estimated Average Glucose Result: 6.3 % (10-27-22 @ 07:55)

## 2022-12-13 LAB
ANION GAP SERPL CALC-SCNC: 11 MMOL/L — SIGNIFICANT CHANGE UP (ref 5–17)
BASOPHILS # BLD AUTO: 0 K/UL — SIGNIFICANT CHANGE UP (ref 0–0.2)
BASOPHILS NFR BLD AUTO: 0 % — SIGNIFICANT CHANGE UP (ref 0–2)
BUN SERPL-MCNC: 10 MG/DL — SIGNIFICANT CHANGE UP (ref 7–23)
CALCIUM SERPL-MCNC: 9.3 MG/DL — SIGNIFICANT CHANGE UP (ref 8.4–10.5)
CHLORIDE SERPL-SCNC: 102 MMOL/L — SIGNIFICANT CHANGE UP (ref 96–108)
CO2 SERPL-SCNC: 26 MMOL/L — SIGNIFICANT CHANGE UP (ref 22–31)
CREAT SERPL-MCNC: 0.2 MG/DL — LOW (ref 0.5–1.3)
EGFR: 134 ML/MIN/1.73M2 — SIGNIFICANT CHANGE UP
EOSINOPHIL # BLD AUTO: 0 K/UL — SIGNIFICANT CHANGE UP (ref 0–0.5)
EOSINOPHIL NFR BLD AUTO: 0 % — SIGNIFICANT CHANGE UP (ref 0–6)
GLUCOSE BLDC GLUCOMTR-MCNC: 163 MG/DL — HIGH (ref 70–99)
GLUCOSE BLDC GLUCOMTR-MCNC: 196 MG/DL — HIGH (ref 70–99)
GLUCOSE BLDC GLUCOMTR-MCNC: 220 MG/DL — HIGH (ref 70–99)
GLUCOSE BLDC GLUCOMTR-MCNC: 271 MG/DL — HIGH (ref 70–99)
GLUCOSE SERPL-MCNC: 194 MG/DL — HIGH (ref 70–99)
HCT VFR BLD CALC: 33.2 % — LOW (ref 34.5–45)
HGB BLD-MCNC: 11.3 G/DL — LOW (ref 11.5–15.5)
IMM GRANULOCYTES NFR BLD AUTO: 0.2 % — SIGNIFICANT CHANGE UP (ref 0–0.9)
LYMPHOCYTES # BLD AUTO: 1.31 K/UL — SIGNIFICANT CHANGE UP (ref 1–3.3)
LYMPHOCYTES # BLD AUTO: 23.1 % — SIGNIFICANT CHANGE UP (ref 13–44)
MAGNESIUM SERPL-MCNC: 2.1 MG/DL — SIGNIFICANT CHANGE UP (ref 1.6–2.6)
MCHC RBC-ENTMCNC: 29.4 PG — SIGNIFICANT CHANGE UP (ref 27–34)
MCHC RBC-ENTMCNC: 34 GM/DL — SIGNIFICANT CHANGE UP (ref 32–36)
MCV RBC AUTO: 86.2 FL — SIGNIFICANT CHANGE UP (ref 80–100)
MONOCYTES # BLD AUTO: 0.09 K/UL — SIGNIFICANT CHANGE UP (ref 0–0.9)
MONOCYTES NFR BLD AUTO: 1.6 % — LOW (ref 2–14)
NEUTROPHILS # BLD AUTO: 4.27 K/UL — SIGNIFICANT CHANGE UP (ref 1.8–7.4)
NEUTROPHILS NFR BLD AUTO: 75.1 % — SIGNIFICANT CHANGE UP (ref 43–77)
NRBC # BLD: 0 /100 WBCS — SIGNIFICANT CHANGE UP (ref 0–0)
PHOSPHATE SERPL-MCNC: 4.8 MG/DL — HIGH (ref 2.5–4.5)
PLATELET # BLD AUTO: 318 K/UL — SIGNIFICANT CHANGE UP (ref 150–400)
POTASSIUM SERPL-MCNC: 4 MMOL/L — SIGNIFICANT CHANGE UP (ref 3.5–5.3)
POTASSIUM SERPL-SCNC: 4 MMOL/L — SIGNIFICANT CHANGE UP (ref 3.5–5.3)
RBC # BLD: 3.85 M/UL — SIGNIFICANT CHANGE UP (ref 3.8–5.2)
RBC # FLD: 12.5 % — SIGNIFICANT CHANGE UP (ref 10.3–14.5)
SODIUM SERPL-SCNC: 139 MMOL/L — SIGNIFICANT CHANGE UP (ref 135–145)
WBC # BLD: 5.68 K/UL — SIGNIFICANT CHANGE UP (ref 3.8–10.5)
WBC # FLD AUTO: 5.68 K/UL — SIGNIFICANT CHANGE UP (ref 3.8–10.5)

## 2022-12-13 PROCEDURE — 99233 SBSQ HOSP IP/OBS HIGH 50: CPT

## 2022-12-13 PROCEDURE — 99024 POSTOP FOLLOW-UP VISIT: CPT

## 2022-12-13 RX ADMIN — Medication 75 MILLIGRAM(S): at 21:20

## 2022-12-13 RX ADMIN — Medication 4: at 11:47

## 2022-12-13 RX ADMIN — OXYCODONE HYDROCHLORIDE 5 MILLIGRAM(S): 5 TABLET ORAL at 11:53

## 2022-12-13 RX ADMIN — SENNA PLUS 2 TABLET(S): 8.6 TABLET ORAL at 00:02

## 2022-12-13 RX ADMIN — Medication 1000 MILLIGRAM(S): at 06:04

## 2022-12-13 RX ADMIN — LATANOPROST 1 DROP(S): 0.05 SOLUTION/ DROPS OPHTHALMIC; TOPICAL at 21:21

## 2022-12-13 RX ADMIN — Medication 2: at 16:59

## 2022-12-13 RX ADMIN — OXYCODONE HYDROCHLORIDE 10 MILLIGRAM(S): 5 TABLET ORAL at 19:38

## 2022-12-13 RX ADMIN — DORZOLAMIDE HYDROCHLORIDE 1 DROP(S): 20 SOLUTION/ DROPS OPHTHALMIC at 06:15

## 2022-12-13 RX ADMIN — BRIMONIDINE TARTRATE 1 DROP(S): 2 SOLUTION/ DROPS OPHTHALMIC at 21:21

## 2022-12-13 RX ADMIN — Medication 1000 MILLIGRAM(S): at 07:15

## 2022-12-13 RX ADMIN — BRIMONIDINE TARTRATE 1 DROP(S): 2 SOLUTION/ DROPS OPHTHALMIC at 14:12

## 2022-12-13 RX ADMIN — SENNA PLUS 2 TABLET(S): 8.6 TABLET ORAL at 21:21

## 2022-12-13 RX ADMIN — Medication 50 MILLIGRAM(S): at 00:02

## 2022-12-13 RX ADMIN — BRIMONIDINE TARTRATE 1 DROP(S): 2 SOLUTION/ DROPS OPHTHALMIC at 06:15

## 2022-12-13 RX ADMIN — Medication 1000 MILLIGRAM(S): at 21:20

## 2022-12-13 RX ADMIN — Medication 1000 MILLIGRAM(S): at 14:14

## 2022-12-13 RX ADMIN — OXYCODONE HYDROCHLORIDE 10 MILLIGRAM(S): 5 TABLET ORAL at 03:22

## 2022-12-13 RX ADMIN — OXYCODONE HYDROCHLORIDE 5 MILLIGRAM(S): 5 TABLET ORAL at 12:53

## 2022-12-13 RX ADMIN — METHOCARBAMOL 500 MILLIGRAM(S): 500 TABLET, FILM COATED ORAL at 06:04

## 2022-12-13 RX ADMIN — OXYCODONE HYDROCHLORIDE 10 MILLIGRAM(S): 5 TABLET ORAL at 02:22

## 2022-12-13 RX ADMIN — OXYCODONE HYDROCHLORIDE 10 MILLIGRAM(S): 5 TABLET ORAL at 18:38

## 2022-12-13 RX ADMIN — METHOCARBAMOL 500 MILLIGRAM(S): 500 TABLET, FILM COATED ORAL at 17:36

## 2022-12-13 RX ADMIN — Medication 75 MILLIGRAM(S): at 14:15

## 2022-12-13 RX ADMIN — Medication 100 MILLIGRAM(S): at 06:05

## 2022-12-13 RX ADMIN — Medication 2: at 07:08

## 2022-12-13 RX ADMIN — OXYCODONE HYDROCHLORIDE 10 MILLIGRAM(S): 5 TABLET ORAL at 07:20

## 2022-12-13 RX ADMIN — DORZOLAMIDE HYDROCHLORIDE 1 DROP(S): 20 SOLUTION/ DROPS OPHTHALMIC at 14:12

## 2022-12-13 RX ADMIN — Medication 50 MILLIGRAM(S): at 06:04

## 2022-12-13 RX ADMIN — Medication 6: at 23:05

## 2022-12-13 RX ADMIN — Medication 1000 MILLIGRAM(S): at 22:20

## 2022-12-13 RX ADMIN — OXYCODONE HYDROCHLORIDE 10 MILLIGRAM(S): 5 TABLET ORAL at 23:42

## 2022-12-13 RX ADMIN — METHOCARBAMOL 500 MILLIGRAM(S): 500 TABLET, FILM COATED ORAL at 11:02

## 2022-12-13 RX ADMIN — Medication 1000 MILLIGRAM(S): at 15:46

## 2022-12-13 RX ADMIN — DORZOLAMIDE HYDROCHLORIDE 1 DROP(S): 20 SOLUTION/ DROPS OPHTHALMIC at 21:21

## 2022-12-13 RX ADMIN — OXYCODONE HYDROCHLORIDE 10 MILLIGRAM(S): 5 TABLET ORAL at 08:20

## 2022-12-13 RX ADMIN — ATORVASTATIN CALCIUM 40 MILLIGRAM(S): 80 TABLET, FILM COATED ORAL at 21:20

## 2022-12-13 NOTE — CHART NOTE - NSCHARTNOTEFT_GEN_A_CORE
JAMAL overnight. Pain controlled with PRN IV dilaudid and ERAS meds. HMV and ANA LUISA remain in place. Pain controlled with oral medications. PT/OT recommends home with no needs.

## 2022-12-13 NOTE — OCCUPATIONAL THERAPY INITIAL EVALUATION ADULT - MANUAL MUSCLE TESTING RESULTS, REHAB EVAL
B UE at least 4/5, B  strength 4/5, B LE at baseline grossly 3+/5; able to weight bear while ambulating

## 2022-12-13 NOTE — PHYSICAL THERAPY INITIAL EVALUATION ADULT - GENERAL OBSERVATIONS, REHAB EVAL
PT IE completed. Patient received semi supine in bed +heplock IV, +lumbar dressing C/D/I, +hemovac x1, +ANA LUISA drain x1, +NP [place] (nsx) present at bedside, patient NAD, willing to work with PT.

## 2022-12-13 NOTE — PHYSICAL THERAPY INITIAL EVALUATION ADULT - NEUROVASCULAR ASSESSMENT RLE
Patient reports slight numbness around lateral malleolus in R ankle./numbness present/warm/no discoloration

## 2022-12-13 NOTE — PHYSICAL THERAPY INITIAL EVALUATION ADULT - PERTINENT HX OF CURRENT PROBLEM, REHAB EVAL
63 yo F PMH polio (during childhood, chronic b/l LE weakness), DM, asthma, HLD, s/p L5-S1 alek laminectomy & TLIF, L4-S2/AI fusion (10/26 w/ Dr Manzanares) presents w/ b/l LE pain since surgery. Recently LLE pain became worse, associated w/ RLE numbness and b/l LE tingling with worsened LBP. Pt ambulates w/ walker at baseline. Pt admitted for pain management and possible intervention for CSF collection at prior surgical bed seen on MRI 12/6. s/p lumbar wound revision 12/12.

## 2022-12-13 NOTE — OCCUPATIONAL THERAPY INITIAL EVALUATION ADULT - IMPAIRED TRANSFERS: TOILET, REHAB EVAL
[FreeTextEntry1] : 31 yo F with hx of class III obesity, asthma, mobitz type 2, referred by cardiology for possible ADRIANA.\par \par 1) ADRIANA - Given her nocturnal arrhythmia, snoring and witnessed apneas, obesity, and crowded posterior oropharynx, she is at risk for ADRIANA. Nocturnal hypoxemia and sleep disordered breathing may contribute to the arrhythmias and is necessary to rule out. HST will be performed with EKG leads. The ramifications of obstructive sleep apnea and its potential therapeutic modalities were discussed with the patient. The dangers of drowsy driving were discussed with the patient. The patient was warned to avoid drowsy driving. The patient will followup after results of this study are available.\par \par 2) Insufficient sleep time - While sleep apnea may be contributing to her poor sleep, it is likely that insufficient sleep time is as well. She only gets about 5-6h of sleep every night and she has been encouraged to increase her TST. Counseling and advise has been given regarding poor sleep hygiene and stimulus control as well, given her sleep onset insomnia. If she is unable to fall asleep, either at the beginning or in the middle of her desired sleep time for >30min, she is to get out of bed and return to bed only when sleepy again. Patient showed understanding and will attempt to adhere to these measures.\par 
impaired balance/decreased strength

## 2022-12-13 NOTE — OCCUPATIONAL THERAPY INITIAL EVALUATION ADULT - DIAGNOSIS, OT EVAL
Pt presents with slight decrease L foot (lateral aspect) sensation, reporting increase in L LE length discrepancy demonstrating with decrease in balance affecting ADLs and functional mobility.

## 2022-12-13 NOTE — PROGRESS NOTE ADULT - SUBJECTIVE AND OBJECTIVE BOX
HPI:  Pt is a 61 yo F PMH polio (during childhood, chronic b/l LE weakness), DM, asthma, HLD, s/p L5-S1 alek laminectomy & TLIF, L4-S2/AI fusion (10/26 w/ Dr Manzanares) presents w/ b/l LE pain since surgery. Pt said she had RLE pain since surgery that has worsened. Recently LLE pain became worse, associated w/ RLE numbness and b/l LE tingling with worsened LBP. Pt ambulates w/ walker at baseline. MRI from 12/6 shows CSF fluid collection within the laminectomy/foraminotomy bed of L5-S1 w/ severe spinal canal stenosis and thecal sac compression w/ R >L neural foraminal narrowing (non-enhancing). Denies bowel/bladder incontinence, recent falls, recent travel or known sick contact.     HOSPITAL COURSE:  12/7: admitted for pain control and possible CSF leak repair  12/8: JAMAL overnight, Pt c/o of LLE pain, pt NPO IR consulted for possible drainage of fluid collection.   12/9: c/o RLE pain o/n.   12/10: JAMAL overnight. Pain well controlled. OR on Monday for wound revision.   12/11: JAMAL overnight. pain better controlled. pending OR monday.   12/12: JAMAL overnight. s/p lumbar wound revision.   12/13: JAMAL overnight. Pain controlled with PRN IV dilaudid and ERAS meds. F/u drain outputs. Pend PT assessment.       OVERNIGHT EVENTS:  Vital Signs Last 24 Hrs  T(C): 36.8 (13 Dec 2022 00:41), Max: 36.8 (12 Dec 2022 17:21)  T(F): 98.2 (13 Dec 2022 00:41), Max: 98.2 (12 Dec 2022 17:21)  HR: 68 (13 Dec 2022 00:41) (54 - 73)  BP: 108/70 (13 Dec 2022 00:41) (108/70 - 146/75)  BP(mean): 102 (12 Dec 2022 18:45) (92 - 103)  RR: 17 (13 Dec 2022 00:41) (11 - 37)  SpO2: 93% (13 Dec 2022 00:41) (93% - 100%)    Parameters below as of 13 Dec 2022 00:41  Patient On (Oxygen Delivery Method): room air        I&O's Summary    11 Dec 2022 07:01  -  12 Dec 2022 07:00  --------------------------------------------------------  IN: 0 mL / OUT: 800 mL / NET: -800 mL    12 Dec 2022 07:01  -  13 Dec 2022 04:14  --------------------------------------------------------  IN: 345 mL / OUT: 1110 mL / NET: -765 mL        PHYSICAL EXAM:  General: NAD, pt is comfortably resting in bed, A&O x3, on RA  HEENT: CN II-XII grossly intact, PERRL 3mm, EOMI b/l, face symmetric, tongue midline, neck FROM  Cardiovascular: RRR, normal S1 and S2   Respiratory: lungs CTAB, no wheezing, rhonchi, or crackles   GI: normoactive BS to auscultation, abd soft, NTND   Neuro: no aphasia, speech clear, no dysmetria, no pronator drift  b/l UE 5/5, b/l HF/KF 2/5, L DF/PF 4+/5, R DF/PF 4-/5   sensation intact to light touch throughout   Extremities: distal pulses 2+ x4  Wound/incision: posterior lumbar incision C/D/I  Drains: HMV x1, ANA LUISA x1     TUBES/LINES:  [] CVC  [] A-line  [] Lumbar Drain  [] Ventriculostomy  [X] Other - wound drains     DIET:  [] NPO  [X] Mechanical  [] Tube feeds    LABS:                        11.2   5.77  )-----------( 273      ( 12 Dec 2022 07:53 )             33.4     12-12    138  |  102  |  8   ----------------------------<  151<H>  4.0   |  26  |  0.21<L>    Ca    8.9      12 Dec 2022 07:53  Phos  4.7     12-12  Mg     1.9     12-12      PT/INR - ( 11 Dec 2022 10:57 )   PT: 11.7 sec;   INR: 0.98          PTT - ( 11 Dec 2022 14:00 )  PTT:31.8 sec        CAPILLARY BLOOD GLUCOSE      POCT Blood Glucose.: 123 mg/dL (12 Dec 2022 17:38)  POCT Blood Glucose.: 123 mg/dL (12 Dec 2022 12:15)  POCT Blood Glucose.: 141 mg/dL (12 Dec 2022 06:51)      Drug Levels: [] N/A    CSF Analysis: [] N/A      Allergies    No Known Allergies    Intolerances      MEDICATIONS:  Antibiotics:  ceFAZolin   IVPB 2000 milliGRAM(s) IV Intermittent every 8 hours    Neuro:  acetaminophen     Tablet .. 1000 milliGRAM(s) Oral every 8 hours  acetaminophen     Tablet .. 1000 milliGRAM(s) Oral every 8 hours  HYDROmorphone  Injectable 0.5 milliGRAM(s) IV Push every 4 hours PRN  methocarbamol 500 milliGRAM(s) Oral every 8 hours  methocarbamol 500 milliGRAM(s) Oral every 8 hours  ondansetron   Disintegrating Tablet 4 milliGRAM(s) Oral every 6 hours  oxyCODONE    IR 10 milliGRAM(s) Oral every 4 hours PRN  oxyCODONE    IR 5 milliGRAM(s) Oral every 4 hours PRN  oxyCODONE    IR 10 milliGRAM(s) Oral every 4 hours PRN  oxyCODONE    IR 5 milliGRAM(s) Oral every 4 hours PRN  pregabalin 50 milliGRAM(s) Oral three times a day  pregabalin 75 milliGRAM(s) Oral every 8 hours    Anticoagulation:    OTHER:  atorvastatin 40 milliGRAM(s) Oral at bedtime  bisacodyl 5 milliGRAM(s) Oral daily PRN  bisacodyl 5 milliGRAM(s) Oral every 12 hours PRN  brimonidine 0.2% Ophthalmic Solution 1 Drop(s) Both EYES every 8 hours  dorzolamide 2% Ophthalmic Solution 1 Drop(s) Both EYES every 8 hours  famotidine    Tablet 20 milliGRAM(s) Oral two times a day PRN  insulin lispro (ADMELOG) corrective regimen sliding scale   SubCutaneous three times a day before meals  latanoprost 0.005% Ophthalmic Solution 1 Drop(s) Both EYES at bedtime  oxybutynin 5 milliGRAM(s) Oral two times a day PRN  senna 2 Tablet(s) Oral at bedtime  senna 2 Tablet(s) Oral at bedtime  simethicone 80 milliGRAM(s) Chew every 8 hours PRN    IVF:  sodium chloride 0.9%. 1000 milliLiter(s) IV Continuous <Continuous>    CULTURES:    RADIOLOGY & ADDITIONAL TESTS:      ASSESSMENT:  61 yo F PMH polio (during childhood, chronic b/l LE weakness), DM, asthma, HLD, s/p L5-S1 alek laminectomy & TLIF, L4-S2/AI fusion (10/26 w/ Dr Manzanares) presents w/ b/l LE pain since surgery. Recently LLE pain became worse, associated w/ RLE numbness and b/l LE tingling with worsened LBP. Pt ambulates w/ walker at baseline. Pt admitted for pain management and possible intervention for CSF collection at prior surgical bed seen on MRI 12/6. s/p lumbar wound revision 12/12.       CSF LEAK    Acute poliomyelitis, unspecified    Acute posthemorrhagic anemia    ADVERSE EFFECT OF GLUCOCORT/SYNTH ANALOG, INIT    Anxiety disorder, unspecified    Arthrodesis status    Cauda equina syndrome    Congenital spondylolisthesis    COVID-19    Disease of spinal cord, unspecified    Encounter for other preprocedural examination    Encounter for prophylactic measures, unspecified    Essential (primary) hypertension    Foot drop, unspecified foot    Long term (current) use of oral hypoglycemic drugs    Migraine, unspecified, not intractable, without status migrainosus    Nutritional anemia, unspecified    Orthostatic hypotension    OTH IV DISC DISPLACEMENT LUMBAR RGN    Other acute postprocedural pain    Other idiopathic scoliosis, site unspecified    Other intervertebral disc degeneration, lumbar region    Other osteoporosis without current pathological fracture    Other specified disorders of bone density and structure, unspecified site    Other specified symptoms and signs involving the circulatory and respiratory systems    Other spondylosis with myelopathy, thoracic region    Other spondylosis with radiculopathy, lumbar region    Personal history of COVID-19    Personal history of other diseases of the digestive system    Personal history of other diseases of the musculoskeletal system and connective tissue    Personal history of other specified conditions    Polyneuropathy, unspecified    Polyp of colon    Postpolio syndrome    Pure hypercholesterolemia, unspecified    Radiculopathy, lumbar region    Radiculopathy, lumbosacral region    Repeated falls    Spinal stenosis, lumbar region without neurogenic claudication    Spondylolisthesis, lumbosacral region    Spondylolysis, lumbar region    Spondylosis without myelopathy or radiculopathy, cervical region    Spondylosis without myelopathy or radiculopathy, lumbar region    Spondylosis without myelopathy or radiculopathy, lumbosacral region    Trigger finger, unspecified finger    Type 2 diabetes mellitus without complications    Unspecified abdominal pain    Unspecified asthma, uncomplicated    Unspecified cord compression    Unspecified fracture of unspecified lumbar vertebra, subsequent encounter for fracture with nonunion    Unspecified fracture of unspecified patella, initial encounter for closed fracture    Unspecified osteoarthritis, unspecified site    Handoff    MEWS Score    Poliomyelitis    DM (diabetes mellitus)    Asthma    HTN (hypertension)    Hypercholesterolemia    Colonic polyp    Osteoarthritis    Trigger finger    Patella fracture    COVID-19 virus infection    History of colitis    History of urinary incontinence    Spondylolisthesis, lumbar region    Lumbar spondylosis    Lumbar surgical wound fluid collection    Lumbar surgical wound fluid collection    Lumbar spondylosis    DM (diabetes mellitus)    Hypercholesterolemia    HTN (hypertension)    Poliomyelitis    Prophylactic measure    Anemia    Preoperative clearance    Exploration, wound, lumbar    History of shoulder surgery    DM (diabetes mellitus)    Room Service Assist    [email or web address]        PLAN:  NEURO:  - neuro checks/vital signs q4hrs   - pain control PRN: tylenol, oxycodone, robaxin, standing lyrica    - MRI L spine completed 12/6; CSF fluid collection within the laminectomy/foraminotomy bed of L5-S1 w/ severe spinal canal stenosis and thecal sac compression w/ R >L neural foraminal narrowing (non-enhancing).   - HMVx1, JPx1  - no post-op imaging needed     CARDIO:  - SBP goal normotensive   - atorvastatin 40mg QD (home med)   - ASA 81mg QD - on hold     PULM:  - NC PRN, IS    GI:  - ADAT  - bowel regimen   - famotidine 20mg BID (home med)     RENAL:  - IVF until tolerating PO   - cont home oxybutinin    ENDO:  - ISS    ID:  - afebrile, no leukocytosis   - postop Ancef  - f/u OR cx     HEME:  - SCDs DVT ppx    Dispo: regional status, full code, dispo pending PT assessment     Assessment and plan d/w Dr. Manzanares

## 2022-12-13 NOTE — PHYSICAL THERAPY INITIAL EVALUATION ADULT - ADDITIONAL COMMENTS
Patient lives with spouse and cousin in private house with no steps to enter. Has [facility] on Tuesdays and Wednesdays (16 hours each). Ambulates with SC or RW indoors, uses electric scooter for outdoors. Owns shower chair.

## 2022-12-13 NOTE — OCCUPATIONAL THERAPY INITIAL EVALUATION ADULT - GENERAL OBSERVATIONS, REHAB EVAL
Pt received semisupine in bed NAD, +IVL, +tele, +lumbar dressing C/I/D, + hemovac, + ANA LUISA drain.

## 2022-12-13 NOTE — OCCUPATIONAL THERAPY INITIAL EVALUATION ADULT - GROSSLY INTACT, SENSORY
[FreeTextEntry1] : I have asked Allison to follow up in a year upon completion of annual bilateral mammogram and sonogram.
Grossly Intact

## 2022-12-13 NOTE — OCCUPATIONAL THERAPY INITIAL EVALUATION ADULT - PERTINENT HX OF CURRENT PROBLEM, REHAB EVAL
61 yo F PMH polio (during childhood, chronic b/l LE weakness), DM, asthma, HLD, s/p L5-S1 alek laminectomy & TLIF, L4-S2/AI fusion (10/26 w/ Dr Manzanares) presents w/ b/l LE pain since surgery. Recently LLE pain became worse, associated w/ RLE numbness and b/l LE tingling with worsened LBP. Pt ambulates w/ walker at baseline. Pt admitted for pain management and possible intervention for CSF collection at prior surgical bed seen on MRI 12/6. s/p lumbar wound revision 12/12.

## 2022-12-13 NOTE — PROGRESS NOTE ADULT - SUBJECTIVE AND OBJECTIVE BOX
SUBJECTIVE:  Patient seen and examined at bedside.  Sitting in bedside chair.  Feels well post operative, back pain significantly improved.  Was able to walk with PT today.  Last BM prior to OR    ROS:  Denies fevers, chills, headache, vision changes, neck pain, cough, SOB, chest pain, Abdominal pain, N/V, dysuria or new rash.  All other ROS negative except as above    Vital Signs Last 12 Hrs  T(F): 97.7 (12-13-22 @ 09:07), Max: 97.7 (12-13-22 @ 09:07)  HR: 76 (12-13-22 @ 09:07) (72 - 76)  BP: 103/67 (12-13-22 @ 09:07) (103/67 - 106/71)  BP(mean): --  RR: 17 (12-13-22 @ 09:07) (16 - 17)  SpO2: 94% (12-13-22 @ 09:07) (94% - 95%)  I&O's Summary    12 Dec 2022 07:01  -  13 Dec 2022 07:00  --------------------------------------------------------  IN: 345 mL / OUT: 1260 mL / NET: -915 mL    13 Dec 2022 07:01  -  13 Dec 2022 13:21  --------------------------------------------------------  IN: 240 mL / OUT: 400 mL / NET: -160 mL        PHYSICAL EXAM:  Constitutional: NAD, comfortable in bedside chair  HEENT: PERRLA, EOMI, no conjunctival pallor or scleral icterus, MMM  Neck: Supple, no JVD  Respiratory: CTA B/L. No w/r/r.   Cardiovascular: RRR, normal S1 and S2, no m/r/g.   Gastrointestinal: +BS, soft NTND, no guarding or rebound tenderness, no palpable masses   Extremities: wwp; no cyanosis, clubbing or edema.  Back: Hemovacs in place   Vascular: Pulses equal and strong throughout.   Neurological: AAOx3, no CN deficits, sensation intact throughout, b/l LE weakness  Skin: No gross skin abnormalities or rashes      LABS:                        11.3   5.68  )-----------( 318      ( 13 Dec 2022 05:30 )             33.2     12-13    139  |  102  |  10  ----------------------------<  194<H>  4.0   |  26  |  0.20<L>    Ca    9.3      13 Dec 2022 05:30  Phos  4.8     12-13  Mg     2.1     12-13      PTT - ( 11 Dec 2022 14:00 )  PTT:31.8 sec        RADIOLOGY & ADDITIONAL TESTS:  No new imaging    MEDICATIONS  (STANDING):  acetaminophen     Tablet .. 1000 milliGRAM(s) Oral every 8 hours  atorvastatin 40 milliGRAM(s) Oral at bedtime  brimonidine 0.2% Ophthalmic Solution 1 Drop(s) Both EYES every 8 hours  dorzolamide 2% Ophthalmic Solution 1 Drop(s) Both EYES every 8 hours  insulin lispro (ADMELOG) corrective regimen sliding scale   SubCutaneous three times a day before meals  latanoprost 0.005% Ophthalmic Solution 1 Drop(s) Both EYES at bedtime  methocarbamol 500 milliGRAM(s) Oral every 8 hours  ondansetron   Disintegrating Tablet 4 milliGRAM(s) Oral every 6 hours  pregabalin 75 milliGRAM(s) Oral every 8 hours  senna 2 Tablet(s) Oral at bedtime  sodium chloride 0.9%. 1000 milliLiter(s) (75 mL/Hr) IV Continuous <Continuous>    MEDICATIONS  (PRN):  bisacodyl 5 milliGRAM(s) Oral every 12 hours PRN Constipation  famotidine    Tablet 20 milliGRAM(s) Oral two times a day PRN Dyspepsia  oxybutynin 5 milliGRAM(s) Oral two times a day PRN Bladder spasms  oxyCODONE    IR 10 milliGRAM(s) Oral every 4 hours PRN Severe Pain (7 - 10)  oxyCODONE    IR 5 milliGRAM(s) Oral every 4 hours PRN Moderate Pain (4 - 6)  simethicone 80 milliGRAM(s) Chew every 8 hours PRN Gas

## 2022-12-13 NOTE — OCCUPATIONAL THERAPY INITIAL EVALUATION ADULT - LIVES WITH, PROFILE
Pt lives with her  and cousin in private house with no CHRISTIAN. Pt at baseline is ind for transfers with rolling walker and requires minimal assistance for ADLs. Pt has [facility] 2 days a week 16 hours. + shower/tub, + shower chair./spouse

## 2022-12-13 NOTE — PHYSICAL THERAPY INITIAL EVALUATION ADULT - ACTIVE RANGE OF MOTION EXAMINATION, REHAB EVAL
**Limited (B) hip flexion and (B) knee extension ROM, however, ROM is functional.**/bilateral upper extremity Active ROM was WFL (within functional limits)/bilateral  lower extremity Active ROM was WFL (within functional limits)

## 2022-12-13 NOTE — PROGRESS NOTE ADULT - PROBLEM SELECTOR PLAN 1
s/p L4-S2/AI fusion (10/26) now with fluid collection seen on MRI  - s/p lumbar wound revision  - pain control per neurosurgery team  - PT post op recommended home no needs

## 2022-12-13 NOTE — PHYSICAL THERAPY INITIAL EVALUATION ADULT - MANUAL MUSCLE TESTING RESULTS, REHAB EVAL
R ankle DF 2/5; R PF 2+/5; R knee extension 2+/5; R hip flexion 2-/5; L ankle DF/PF 4/5; L knee extension 2+/5; L hip flexion 2-/5

## 2022-12-13 NOTE — PROGRESS NOTE ADULT - SUBJECTIVE AND OBJECTIVE BOX
NEUROSURGERY PAIN MANAGEMENT CONSULT NOTE    Chief Complaint: back pain, RLE pain     HPI:  Pt is a 63 yo F PMH polio (during childhood, chronic b/l LE weakness), DM, asthma, HLD, s/p L5-S1 alek laminectomy & TLIF, L4-S2/AI fusion (10/26 w/ Dr Manzanares) presents w/ b/l LE pain since surgery. Pt said she had RLE pain since surgery that has worsened. Recently LLE pain became worse, associated w/ RLE numbness and b/l LE tingling with worsened LBP. Pt ambulates w/ walker at baseline. MRI from 12/6 shows CSF fluid collection within the laminectomy/foraminotomy bed of L5-S1 w/ severe spinal canal stenosis and thecal sac compression w/ R >L neural foraminal narrowing (non-enhancing). Denies bowel/bladder incontinence, recent falls, recent travel or known sick contact.      (07 Dec 2022 18:36)      PAST MEDICAL & SURGICAL HISTORY:  Poliomyelitis      DM (diabetes mellitus)      Asthma      HTN (hypertension)      Hypercholesterolemia      Colonic polyp      Osteoarthritis      Trigger finger      Patella fracture  left      COVID-19 virus infection      History of colitis      History of urinary incontinence      Spondylolisthesis, lumbar region      Lumbar spondylosis  L5      History of shoulder surgery          FAMILY HISTORY:      SOCIAL HISTORY:  [ ] Denies Smoking, Alcohol, or Drug Use    HOME MEDICATIONS:   Please refer to initial HNP    PAIN HOME MEDICATIONS:    Allergies    No Known Allergies    Intolerances        PAIN MEDICATIONS:  acetaminophen     Tablet .. 1000 milliGRAM(s) Oral every 8 hours  methocarbamol 500 milliGRAM(s) Oral every 8 hours  ondansetron   Disintegrating Tablet 4 milliGRAM(s) Oral every 6 hours  oxyCODONE    IR 10 milliGRAM(s) Oral every 4 hours PRN  oxyCODONE    IR 5 milliGRAM(s) Oral every 4 hours PRN  pregabalin 75 milliGRAM(s) Oral every 8 hours    Heme:    Antibiotics:    Cardiovascular:    GI:  bisacodyl 5 milliGRAM(s) Oral every 12 hours PRN  famotidine    Tablet 20 milliGRAM(s) Oral two times a day PRN  senna 2 Tablet(s) Oral at bedtime  simethicone 80 milliGRAM(s) Chew every 8 hours PRN    Endocrine:  atorvastatin 40 milliGRAM(s) Oral at bedtime  insulin lispro (ADMELOG) corrective regimen sliding scale   SubCutaneous three times a day before meals    All Other Medications:  brimonidine 0.2% Ophthalmic Solution 1 Drop(s) Both EYES every 8 hours  dorzolamide 2% Ophthalmic Solution 1 Drop(s) Both EYES every 8 hours  latanoprost 0.005% Ophthalmic Solution 1 Drop(s) Both EYES at bedtime  oxybutynin 5 milliGRAM(s) Oral two times a day PRN  sodium chloride 0.9%. 1000 milliLiter(s) IV Continuous <Continuous>      Vital Signs Last 24 Hrs  T(C): 36.5 (13 Dec 2022 09:07), Max: 36.8 (12 Dec 2022 17:21)  T(F): 97.7 (13 Dec 2022 09:07), Max: 98.2 (12 Dec 2022 17:21)  HR: 76 (13 Dec 2022 09:07) (54 - 76)  BP: 103/67 (13 Dec 2022 09:07) (103/67 - 146/75)  BP(mean): 102 (12 Dec 2022 18:45) (92 - 103)  RR: 17 (13 Dec 2022 09:07) (11 - 37)  SpO2: 94% (13 Dec 2022 09:07) (93% - 100%)    Parameters below as of 13 Dec 2022 09:07  Patient On (Oxygen Delivery Method): room air        LABS:                        11.3   5.68  )-----------( 318      ( 13 Dec 2022 05:30 )             33.2     12-13    139  |  102  |  10  ----------------------------<  194<H>  4.0   |  26  |  0.20<L>    Ca    9.3      13 Dec 2022 05:30  Phos  4.8     12-13  Mg     2.1     12-13      PTT - ( 11 Dec 2022 14:00 )  PTT:31.8 sec      RADIOLOGY:    Drug Screen:        REVIEW OF SYSTEMS:  CONSTITUTIONAL: No fever or fatigue O/N.   EYES: No eye pain, visual disturbances  ENMT:  No difficulty hearing. No throat pain  NECK: No pain or stiffness  RESPIRATORY: No cough, wheezing; No shortness of breath  CARDIOVASCULAR: No chest pain, palpitations.   GASTROINTESTINAL: Pt reports passing gas. No bowel movements. No abdominal or epigastric pain. No nausea, vomiting. GENITOURINARY: No dysuria, frequency, or incontinence  NEUROLOGICAL: No headaches, loss of strength, numbness, or tremors. No dizzinesss or lightheadedness with pain medications.   MUSCULOSKELETAL: Incisional back pain. No joint pain or swelling; No muscle, or extremity pain    PAIN ASSESSMENT: pain better controlled post op     PHYSICAL EXAM  GENERAL: Laying in bed, NAD  Neuro: CN II-XII PERRL, EOMI  Cranial nerves grossly intact  Motor exam: MAEx4  Sensation intact to LT in UE/LE in 3 dermatomes  CHEST/LUNG: Clear to auscultation bilaterally; No rales, rhonchi, wheezing, or rubs  HEART: Regular rate and rhythm; No murmurs, rubs, or gallops  ABDOMEN: Soft, Nontender, Nondistended; Bowel sounds present  EXTREMITIES:  2+ Peripheral Pulses, No clubbing, cyanosis, or edema  SKIN: No rashes or lesions      ASSESSMENT:   63 yo F PMH polio (during childhood, chronic b/l LE weakness), DM, asthma, HLD, s/p L5-S1 alek laminectomy & TLIF, L4-S2/AI fusion (10/26 w/ Dr Manzanares) presents w/ b/l LE pain since surgery. Recently LLE pain became worse, associated w/ RLE numbness and b/l LE tingling with worsened LBP. Pt ambulates w/ walker at baseline. Pt admitted for pain management and possible intervention for CSF collection at prior surgical bed seen on MRI 12/6. s/p lumbar wound revision 12/12.       PLAN:   - Pain:  Tylenol to 1000mg every 8 hours s  Lyrica to 75mg every 8 hours  Robaxin to 500mg every 8 hours   Oxycodone 5/10mg every 4 hours as needed for moderate to severe pain     - Bowel regimen: Senna    - Nausea ppx: Zofran standing  - Functional Goals: Pt will get OOB with PT today. Pt will resume previous level of activity without impairment from surgery.   - Additional Consults: None recommended.   - Additional Labs/Imaging:  None recommended.   - Follow up, Discharge Planning: pending PT/OT   - Pain Management follow up plan: will cont to follow    d/w Dr. Osman

## 2022-12-13 NOTE — PHYSICAL THERAPY INITIAL EVALUATION ADULT - GAIT DEVIATIONS NOTED, PT EVAL
Demo fairly steady gait with RW despite (B) weakness. Circumduction gait mechanics, however, no LOB observed and good navigation of turns and hallway obstacles./decreased carlos/increased time in double stance/decreased velocity of limb motion/decreased step length/decreased stride length/decreased weight-shifting ability

## 2022-12-14 ENCOUNTER — TRANSCRIPTION ENCOUNTER (OUTPATIENT)
Age: 62
End: 2022-12-14

## 2022-12-14 LAB
ANION GAP SERPL CALC-SCNC: 9 MMOL/L — SIGNIFICANT CHANGE UP (ref 5–17)
B2 TRANSFERRIN FLD QL: NEGATIVE — SIGNIFICANT CHANGE UP
B2 TRANSFERRIN FLD QL: NEGATIVE — SIGNIFICANT CHANGE UP
BUN SERPL-MCNC: 10 MG/DL — SIGNIFICANT CHANGE UP (ref 7–23)
CALCIUM SERPL-MCNC: 9.1 MG/DL — SIGNIFICANT CHANGE UP (ref 8.4–10.5)
CHLORIDE SERPL-SCNC: 103 MMOL/L — SIGNIFICANT CHANGE UP (ref 96–108)
CO2 SERPL-SCNC: 29 MMOL/L — SIGNIFICANT CHANGE UP (ref 22–31)
CREAT SERPL-MCNC: 0.26 MG/DL — LOW (ref 0.5–1.3)
EGFR: 126 ML/MIN/1.73M2 — SIGNIFICANT CHANGE UP
GLUCOSE BLDC GLUCOMTR-MCNC: 113 MG/DL — HIGH (ref 70–99)
GLUCOSE BLDC GLUCOMTR-MCNC: 148 MG/DL — HIGH (ref 70–99)
GLUCOSE BLDC GLUCOMTR-MCNC: 168 MG/DL — HIGH (ref 70–99)
GLUCOSE BLDC GLUCOMTR-MCNC: 236 MG/DL — HIGH (ref 70–99)
GLUCOSE SERPL-MCNC: 147 MG/DL — HIGH (ref 70–99)
HCT VFR BLD CALC: 34.1 % — LOW (ref 34.5–45)
HGB BLD-MCNC: 11.2 G/DL — LOW (ref 11.5–15.5)
MAGNESIUM SERPL-MCNC: 1.9 MG/DL — SIGNIFICANT CHANGE UP (ref 1.6–2.6)
MCHC RBC-ENTMCNC: 29.6 PG — SIGNIFICANT CHANGE UP (ref 27–34)
MCHC RBC-ENTMCNC: 32.8 GM/DL — SIGNIFICANT CHANGE UP (ref 32–36)
MCV RBC AUTO: 90.2 FL — SIGNIFICANT CHANGE UP (ref 80–100)
NRBC # BLD: 0 /100 WBCS — SIGNIFICANT CHANGE UP (ref 0–0)
PHOSPHATE SERPL-MCNC: 5.1 MG/DL — HIGH (ref 2.5–4.5)
PLATELET # BLD AUTO: 323 K/UL — SIGNIFICANT CHANGE UP (ref 150–400)
POTASSIUM SERPL-MCNC: 4.2 MMOL/L — SIGNIFICANT CHANGE UP (ref 3.5–5.3)
POTASSIUM SERPL-SCNC: 4.2 MMOL/L — SIGNIFICANT CHANGE UP (ref 3.5–5.3)
RBC # BLD: 3.78 M/UL — LOW (ref 3.8–5.2)
RBC # FLD: 13.1 % — SIGNIFICANT CHANGE UP (ref 10.3–14.5)
SODIUM SERPL-SCNC: 141 MMOL/L — SIGNIFICANT CHANGE UP (ref 135–145)
WBC # BLD: 7.69 K/UL — SIGNIFICANT CHANGE UP (ref 3.8–10.5)
WBC # FLD AUTO: 7.69 K/UL — SIGNIFICANT CHANGE UP (ref 3.8–10.5)

## 2022-12-14 PROCEDURE — 99233 SBSQ HOSP IP/OBS HIGH 50: CPT

## 2022-12-14 PROCEDURE — 99024 POSTOP FOLLOW-UP VISIT: CPT

## 2022-12-14 RX ORDER — POLYETHYLENE GLYCOL 3350 17 G/17G
17 POWDER, FOR SOLUTION ORAL DAILY
Refills: 0 | Status: DISCONTINUED | OUTPATIENT
Start: 2022-12-14 | End: 2022-12-16

## 2022-12-14 RX ORDER — MAGNESIUM OXIDE 400 MG ORAL TABLET 241.3 MG
400 TABLET ORAL ONCE
Refills: 0 | Status: COMPLETED | OUTPATIENT
Start: 2022-12-14 | End: 2022-12-14

## 2022-12-14 RX ADMIN — Medication 75 MILLIGRAM(S): at 13:41

## 2022-12-14 RX ADMIN — DORZOLAMIDE HYDROCHLORIDE 1 DROP(S): 20 SOLUTION/ DROPS OPHTHALMIC at 22:21

## 2022-12-14 RX ADMIN — Medication 1000 MILLIGRAM(S): at 13:41

## 2022-12-14 RX ADMIN — DORZOLAMIDE HYDROCHLORIDE 1 DROP(S): 20 SOLUTION/ DROPS OPHTHALMIC at 13:41

## 2022-12-14 RX ADMIN — Medication 2: at 17:21

## 2022-12-14 RX ADMIN — OXYCODONE HYDROCHLORIDE 10 MILLIGRAM(S): 5 TABLET ORAL at 10:06

## 2022-12-14 RX ADMIN — BRIMONIDINE TARTRATE 1 DROP(S): 2 SOLUTION/ DROPS OPHTHALMIC at 13:41

## 2022-12-14 RX ADMIN — BRIMONIDINE TARTRATE 1 DROP(S): 2 SOLUTION/ DROPS OPHTHALMIC at 22:22

## 2022-12-14 RX ADMIN — Medication 75 MILLIGRAM(S): at 05:17

## 2022-12-14 RX ADMIN — BRIMONIDINE TARTRATE 1 DROP(S): 2 SOLUTION/ DROPS OPHTHALMIC at 05:17

## 2022-12-14 RX ADMIN — MAGNESIUM OXIDE 400 MG ORAL TABLET 400 MILLIGRAM(S): 241.3 TABLET ORAL at 11:34

## 2022-12-14 RX ADMIN — ONDANSETRON 4 MILLIGRAM(S): 8 TABLET, FILM COATED ORAL at 05:18

## 2022-12-14 RX ADMIN — ATORVASTATIN CALCIUM 40 MILLIGRAM(S): 80 TABLET, FILM COATED ORAL at 22:20

## 2022-12-14 RX ADMIN — OXYCODONE HYDROCHLORIDE 10 MILLIGRAM(S): 5 TABLET ORAL at 20:08

## 2022-12-14 RX ADMIN — Medication 1000 MILLIGRAM(S): at 22:19

## 2022-12-14 RX ADMIN — Medication 75 MILLIGRAM(S): at 22:19

## 2022-12-14 RX ADMIN — METHOCARBAMOL 500 MILLIGRAM(S): 500 TABLET, FILM COATED ORAL at 09:12

## 2022-12-14 RX ADMIN — OXYCODONE HYDROCHLORIDE 10 MILLIGRAM(S): 5 TABLET ORAL at 19:38

## 2022-12-14 RX ADMIN — POLYETHYLENE GLYCOL 3350 17 GRAM(S): 17 POWDER, FOR SOLUTION ORAL at 11:34

## 2022-12-14 RX ADMIN — DORZOLAMIDE HYDROCHLORIDE 1 DROP(S): 20 SOLUTION/ DROPS OPHTHALMIC at 05:17

## 2022-12-14 RX ADMIN — OXYCODONE HYDROCHLORIDE 10 MILLIGRAM(S): 5 TABLET ORAL at 10:36

## 2022-12-14 RX ADMIN — Medication 5 MILLIGRAM(S): at 11:13

## 2022-12-14 RX ADMIN — SENNA PLUS 2 TABLET(S): 8.6 TABLET ORAL at 22:19

## 2022-12-14 RX ADMIN — SIMETHICONE 80 MILLIGRAM(S): 80 TABLET, CHEWABLE ORAL at 17:22

## 2022-12-14 RX ADMIN — Medication 1000 MILLIGRAM(S): at 23:19

## 2022-12-14 RX ADMIN — Medication 1000 MILLIGRAM(S): at 06:16

## 2022-12-14 RX ADMIN — LATANOPROST 1 DROP(S): 0.05 SOLUTION/ DROPS OPHTHALMIC; TOPICAL at 22:21

## 2022-12-14 RX ADMIN — Medication 1000 MILLIGRAM(S): at 05:16

## 2022-12-14 RX ADMIN — METHOCARBAMOL 500 MILLIGRAM(S): 500 TABLET, FILM COATED ORAL at 01:20

## 2022-12-14 RX ADMIN — OXYCODONE HYDROCHLORIDE 10 MILLIGRAM(S): 5 TABLET ORAL at 00:42

## 2022-12-14 RX ADMIN — Medication 1000 MILLIGRAM(S): at 14:41

## 2022-12-14 RX ADMIN — METHOCARBAMOL 500 MILLIGRAM(S): 500 TABLET, FILM COATED ORAL at 19:38

## 2022-12-14 RX ADMIN — Medication 4: at 22:19

## 2022-12-14 NOTE — PROGRESS NOTE ADULT - SUBJECTIVE AND OBJECTIVE BOX
Subjective:  No acute events overnight.  Patient is doing well today and states her pain is improved, currently a 5/10 and improved further with pain medications.  Last bowel movement about 3 days ago.  Tolerating PO intake without issue.  Denies HA, CP, SOB, abdominal pain, nausea, vomiting, fever, chills or diarrhea.     Objective:   Vital Signs Last 24 Hrs  T(C): 36.8 (14 Dec 2022 08:28), Max: 36.8 (14 Dec 2022 08:28)  T(F): 98.2 (14 Dec 2022 08:28), Max: 98.2 (14 Dec 2022 08:28)  HR: 60 (14 Dec 2022 08:28) (60 - 71)  BP: 103/67 (14 Dec 2022 08:28) (102/64 - 123/80)  BP(mean): 79 (14 Dec 2022 08:28) (79 - 95)  RR: 16 (14 Dec 2022 08:28) (15 - 18)  SpO2: 96% (14 Dec 2022 08:28) (95% - 96%)    Parameters below as of 14 Dec 2022 08:28  Patient On (Oxygen Delivery Method): room air    Physical Exam:   -Constitutional: NAD, comfortable in bedside chair  -HEENT: PERRLA, EOMI, no conjunctival pallor or scleral icterus, MMM  -Neck: Supple, no JVD  -Respiratory: CTA B/L. No w/r/r.   -Cardiovascular: RRR, normal S1 and S2, no m/r/g.   -Gastrointestinal: +BS, soft NTND, no guarding or rebound tenderness, no palpable masses   -Extremities: wwp; no cyanosis, clubbing or edema.  -Back: Hemovacs in place   -Vascular: Pulses equal and strong throughout.   -Neurological: AAOx3, no CN deficits, sensation intact throughout, b/l LE weakness  -Skin: No gross skin abnormalities or rashes    Labs:                        11.2   7.69  )-----------( 323      ( 14 Dec 2022 10:00 )             34.1       12-14    141  |  103  |  10  ----------------------------<  147<H>  4.2   |  29  |  0.26<L>    Ca    9.1      14 Dec 2022 10:00  Phos  5.1     12-14  Mg     1.9     12-14    Medications:  MEDICATIONS  (STANDING):  acetaminophen     Tablet .. 1000 milliGRAM(s) Oral every 8 hours  atorvastatin 40 milliGRAM(s) Oral at bedtime  brimonidine 0.2% Ophthalmic Solution 1 Drop(s) Both EYES every 8 hours  dorzolamide 2% Ophthalmic Solution 1 Drop(s) Both EYES every 8 hours  insulin lispro (ADMELOG) corrective regimen sliding scale   SubCutaneous Before meals and at bedtime  latanoprost 0.005% Ophthalmic Solution 1 Drop(s) Both EYES at bedtime  methocarbamol 500 milliGRAM(s) Oral every 8 hours  ondansetron   Disintegrating Tablet 4 milliGRAM(s) Oral every 6 hours  polyethylene glycol 3350 17 Gram(s) Oral daily  pregabalin 75 milliGRAM(s) Oral every 8 hours  senna 2 Tablet(s) Oral at bedtime    MEDICATIONS  (PRN):  bisacodyl 5 milliGRAM(s) Oral every 12 hours PRN Constipation  famotidine    Tablet 20 milliGRAM(s) Oral two times a day PRN Dyspepsia  oxybutynin 5 milliGRAM(s) Oral two times a day PRN Bladder spasms  oxyCODONE    IR 5 milliGRAM(s) Oral every 4 hours PRN Moderate Pain (4 - 6)  oxyCODONE    IR 10 milliGRAM(s) Oral every 4 hours PRN Severe Pain (7 - 10)  simethicone 80 milliGRAM(s) Chew every 8 hours PRN Gas

## 2022-12-14 NOTE — DISCHARGE NOTE PROVIDER - PROVIDER TOKENS
PROVIDER:[TOKEN:[[id number]]] PROVIDER:[TOKEN:[[id number]]],PROVIDER:[TOKEN:[[id number]]],PROVIDER:[TOKEN:[[id number]]]

## 2022-12-14 NOTE — DISCHARGE NOTE PROVIDER - CARE PROVIDERS DIRECT ADDRESSES
[email or web address] ,DirectAddress_Unknown,[email or web address],DirectAddress_Unknown

## 2022-12-14 NOTE — DISCHARGE NOTE PROVIDER - HOSPITAL COURSE
HPI:  Pt is a 61 yo F PMH polio (during childhood, chronic b/l LE weakness), DM, asthma, HLD, s/p L5-S1 alek laminectomy & TLIF, L4-S2/AI fusion (10/26 w/ Dr Manzanares) presents w/ b/l LE pain since surgery. Pt said she had RLE pain since surgery that has worsened. Recently LLE pain became worse, associated w/ RLE numbness and b/l LE tingling with worsened LBP. Pt ambulates w/ walker at baseline. MRI from 12/6 shows CSF fluid collection within the laminectomy/foraminotomy bed of L5-S1 w/ severe spinal canal stenosis and thecal sac compression w/ R >L neural foraminal narrowing (non-enhancing). Denies bowel/bladder incontinence, recent falls, recent travel or known sick contact.     Hospital Course:  12/7: admitted for pain control and possible CSF leak repair  12/8: JAMAL overnight, Pt c/o of LLE pain, pt NPO IR consulted for possible drainage of fluid collection.   12/9: c/o RLE pain o/n.   12/10: JAMAL overnight. Pain well controlled. OR on Monday for wound revision.   12/11: JAMAL overnight. pain better controlled. pending OR monday.   12/12: JAMAL overnight. s/p lumbar wound revision.   12/13: JAMAL overnight. Pain controlled with PRN IV dilaudid and ERAS meds. HMV and ANA LUISA remain in place. Pain controlled with oral medications. PT/OT reccomends home with no needs.   12/14: JAMAL overnight, neuro stable. Pain controlled. F/u HMV and ANA LUISA output.     Patient evaluated by PT/OT who recommended:  Patient is going home? rehab? hospice? Facility Name:     Hospital course c/b:     Exam on day of discharge:    Checklist:   - Obtained follow up appointment from NP  - Reviewed final recommendations of inpatient consults  - review discharge planning on provider handoff  - post op imaging completed  - Neurologically stable for discharge  - Vitals stable for discharge   - Afebrile for discharge  - WBC is stable  - Sodium level is normal  - Pain is adequately controlled  - Pt has PICC/walker/brace/collar   - LACE score (10 or > needs PCP apt)      HPI:  Pt is a 61 yo F PMH polio (during childhood, chronic b/l LE weakness), DM, asthma, HLD, s/p L5-S1 alek laminectomy & TLIF, L4-S2/AI fusion (10/26 w/ Dr Manzanares) presents w/ b/l LE pain since surgery. Pt said she had RLE pain since surgery that has worsened. Recently LLE pain became worse, associated w/ RLE numbness and b/l LE tingling with worsened LBP. Pt ambulates w/ walker at baseline. MRI from 12/6 shows CSF fluid collection within the laminectomy/foraminotomy bed of L5-S1 w/ severe spinal canal stenosis and thecal sac compression w/ R >L neural foraminal narrowing (non-enhancing). Denies bowel/bladder incontinence, recent falls, recent travel or known sick contact.     Hospital Course:  12/7: admitted for pain control and possible CSF leak repair  12/8: JAMAL overnight, Pt c/o of LLE pain, pt NPO IR consulted for possible drainage of fluid collection.   12/9: c/o RLE pain o/n.   12/10: JAMAL overnight. Pain well controlled. OR on Monday for wound revision.   12/11: JAMAL overnight. pain better controlled. pending OR monday.   12/12: JAMAL overnight. s/p lumbar wound revision.   12/13: JAMAL overnight. Pain controlled with PRN IV dilaudid and ERAS meds. HMV and ANA LUISA remain in place. Pain controlled with oral medications. PT/OT reccomends home with no needs.   12/14: JAMAL overnight, neuro stable. Pain controlled. F/u HMV and ANA LUISA output.   12/15: JAMAL overnight, neuro stable. Pain controlled. Pending drain removal and dispo.    12/16: POD 4. JAMAL overnight.     Patient evaluated by PT/OT who recommended: home with no needs   Patient is going home    Hospital course uncomplicated     Exam on day of discharge:  General: NAD, pt is comfortably resting in bed, A&O x3, on RA  HEENT: CN II-XII grossly intact, PERRL 3mm, EOMI b/l, face symmetric, tongue midline, neck FROM  Cardiovascular: RRR, normal S1 and S2   Respiratory: lungs CTAB, no wheezing, rhonchi, or crackles   GI: normoactive BS to auscultation, abd soft, NTND   Neuro: no aphasia, speech clear, no dysmetria, no pronator drift  b/l UE 5/5, b/l HF/KF 2/5, L DF/PF 4+/5, R DF/PF 4-/5   sensation intact to light touch throughout   Extremities: distal pulses 2+ x4  Wound/incision: posterior lumbar incision C/D/I    Patient is neuro stable, vitals stable, afebrile, medically ready for discharge

## 2022-12-14 NOTE — DISCHARGE NOTE PROVIDER - NSDCACTIVITY_GEN_ALL_CORE
[facility]  Dept: [phone]    Velasquez Pizarro MD    Name: Leland Davis      : 1966        You are scheduled to have the following procedure: Colonoscopy    Dr. Velasquez Pizarro MD will be performing this procedure.       Procedure Date: 20 Arrival Time: 6:15 Procedure Time: 7:15       Location: Please report to the [facility] (1) hour before your procedure:  [address], [place] [address]. The main clinic entrance.     Prep medication(s) ordered:  GoLytely/NuLytely and Zofran/Ondansetron (2 tablets, anti-nausea)    These medication(s) will be available at your preferred pharmacy within 1-2 weeks before your procedure.    DO NOT follow the instruction insert that comes with the medication from the pharmacy.    Day Before Your Procedure:  · You must follow a CLEAR LIQUID diet the day before the procedure  · Attached is a list of clear liquid diet items  · You will start your prep in the evening, the day before your procedure.      1) Fill the Nulytely/Golytely bottle with water to the line indicated on the bottle. Place cap on the bottle and shake to assure ingredients are dissolved. Once the prep is prepared it may be refrigerated and must be used within 24 hours. You may add one packet of crystal light for flavoring (NO red, blue or purple).    2) Follow the clear liquid diet all day.    3) At 4 pm the evening before your procedure, take one (1) anti-nausea tablet (Zofran/Ondansetron)    4) At 5 pm the evening before your procedure, start drinking 8 oz of the prep every 10 minutes until the bottle is half (1/2) gone. If you become sick, nauseous, and/or bloated, take a break for about 10 to 15 minutes and then continue to drink the prep.     5) At 8 pm the evening before your procedure, take one (1) anti-nausea tablet (Zofran/Ondansetron)    6)At 9 pm the evening before your procedure, drink the rest of the prep, again drinking 8 oz of the prep every  10 minutes until the bottle is completely gone.     · You must drink the entire bottle to obtain the best results.    · You may become chilled while drinking the prep, do not worry this is normal. You may continue to drink warm liquids during the time you have started drinking the prep.         NOTHING TO DRINK (OR EAT) AFTER MIDNIGHT DAY OF PROCEDURE.    MEDICATION INSTRUCTIONS      Do not take ibuprofen for 3 days prior to procedure.          Please call our office if you need to reschedule your procedure or if you have questions regarding your upcoming procedure: Dept: [phone]    Office Hours:  8:00 am- 4:30 pm Monday-Friday                    Clear Liquid Diet       *No RED, BLUE or PURPLE liquids*    FOOD GROUP RECOMMENDED AVOID   Beverages Fruit juices(e.g. apple, white cranberry, or white grape); pulp-free juices (e.g. orange, lemonade or grapefruit)  Coffee (NO cream, flavored creamer or milk) or tea and carbonated beverages as allowed as tolerated. All others including nectars, prune juices, juices with pulp, tomato or vegetable juice, milk, cream, and cocoa.   Soups Clear broth, bouillon or consomme    Sweets and Desserts Fruit-flavored or unflavored gelatin; fruit ice made from clear fruit juice; plain hard candy; sugar; honey; sugar substitutes; frozen popsicles     Miscellaneous Commercially prepared low residue lactose free nutritional supplements; herbs and mild seasonings, salt and flavor extracts.      Sample Menu  Breakfast  White Cranberry Juice  Gelatin dessert  Tea/Coffee  Sugar Lunch  Broth  Apple Juice  Carbonated soda  Frozen popsicle  Tea/Coffee Dinner  Broth  Orange juice, strained  Gelatin dessert  Carbonated soda  Tea/Coffee   Mid-morning Snack  Gelatin dessert Mid-afternoon snack  Frozen popsicle Evening Snack  Frozen popsicle              Showering allowed/Stairs allowed/Walking - Indoors allowed/No heavy lifting/straining/Walking - Outdoors allowed

## 2022-12-14 NOTE — PROGRESS NOTE ADULT - PROBLEM SELECTOR PLAN 1
s/p L4-S2/AI fusion (10/26) now with fluid collection seen on MRI  - s/p lumbar wound revision  - pain control per neurosurgery team, continue with aggressive bowel regimen while on opioids   - PT post op recommended home no needs

## 2022-12-14 NOTE — PROGRESS NOTE ADULT - SUBJECTIVE AND OBJECTIVE BOX
HPI:  Pt is a 61 yo F PMH polio (during childhood, chronic b/l LE weakness), DM, asthma, HLD, s/p L5-S1 alek laminectomy & TLIF, L4-S2/AI fusion (10/26 w/ Dr Manzanares) presents w/ b/l LE pain since surgery. Pt said she had RLE pain since surgery that has worsened. Recently LLE pain became worse, associated w/ RLE numbness and b/l LE tingling with worsened LBP. Pt ambulates w/ walker at baseline. MRI from 12/6 shows CSF fluid collection within the laminectomy/foraminotomy bed of L5-S1 w/ severe spinal canal stenosis and thecal sac compression w/ R >L neural foraminal narrowing (non-enhancing). Denies bowel/bladder incontinence, recent falls, recent travel or known sick contact.     HOSPITAL COURSE:   12/7: admitted for pain control and possible CSF leak repair  12/8: JAMAL overnight, Pt c/o of LLE pain, pt NPO IR consulted for possible drainage of fluid collection.   12/9: c/o RLE pain o/n.   12/10: JAMAL overnight. Pain well controlled. OR on Monday for wound revision.   12/11: JAMAL overnight. pain better controlled. pending OR monday.   12/12: JAMAL overnight. s/p lumbar wound revision.   12/13: JAMAL overnight. Pain controlled with PRN IV dilaudid and ERAS meds. HMV and ANA LUISA remain in place. Pain controlled with oral medications. PT/OT reccomends home with no needs.   12/14: JAMAL overnight, neuro stable. Pain controlled. F/u HMV and ANA LUISA output.     OVERNIGHT EVENTS:  Vital Signs Last 24 Hrs  T(C): 36.6 (13 Dec 2022 20:31), Max: 36.6 (13 Dec 2022 15:44)  T(F): 97.8 (13 Dec 2022 20:31), Max: 97.8 (13 Dec 2022 15:44)  HR: 71 (13 Dec 2022 20:31) (67 - 76)  BP: 123/80 (13 Dec 2022 20:31) (102/64 - 123/80)  BP(mean): 95 (13 Dec 2022 20:31) (95 - 95)  RR: 18 (13 Dec 2022 20:31) (15 - 18)  SpO2: 95% (13 Dec 2022 20:31) (94% - 95%)    Parameters below as of 13 Dec 2022 20:31  Patient On (Oxygen Delivery Method): room air      I&O's Summary    12 Dec 2022 07:01  -  13 Dec 2022 07:00  --------------------------------------------------------  IN: 345 mL / OUT: 1260 mL / NET: -915 mL    13 Dec 2022 07:01  -  14 Dec 2022 04:04  --------------------------------------------------------  IN: 720 mL / OUT: 2345 mL / NET: -1625 mL        PHYSICAL EXAM:  General: NAD, pt is comfortably resting in bed, A&O x3, on RA  HEENT: CN II-XII grossly intact, PERRL 3mm, EOMI b/l, face symmetric, tongue midline, neck FROM  Cardiovascular: RRR, normal S1 and S2   Respiratory: lungs CTAB, no wheezing, rhonchi, or crackles   GI: normoactive BS to auscultation, abd soft, NTND   Neuro: no aphasia, speech clear, no dysmetria, no pronator drift  b/l UE 5/5, b/l HF/KF 2/5, L DF/PF 4+/5, R DF/PF 4-/5   sensation intact to light touch throughout   Extremities: distal pulses 2+ x4  Wound/incision: posterior lumbar incision C/D/I  Drains: HMV x1, ANA LUISA x1     TUBES/LINES:  [] CVC  [] A-line  [] Lumbar Drain  [] Ventriculostomy  [X] Other - wound drains     DIET:  [] NPO  [X] Mechanical  [] Tube feeds      LABS:                        11.3   5.68  )-----------( 318      ( 13 Dec 2022 05:30 )             33.2     12-13    139  |  102  |  10  ----------------------------<  194<H>  4.0   |  26  |  0.20<L>    Ca    9.3      13 Dec 2022 05:30  Phos  4.8     12-13  Mg     2.1     12-13          CAPILLARY BLOOD GLUCOSE      POCT Blood Glucose.: 271 mg/dL (13 Dec 2022 22:07)  POCT Blood Glucose.: 163 mg/dL (13 Dec 2022 16:50)  POCT Blood Glucose.: 220 mg/dL (13 Dec 2022 11:35)  POCT Blood Glucose.: 196 mg/dL (13 Dec 2022 06:48)      Drug Levels: [] N/A    CSF Analysis: [] N/A      Allergies    No Known Allergies    Intolerances      MEDICATIONS:  Antibiotics:    Neuro:  acetaminophen     Tablet .. 1000 milliGRAM(s) Oral every 8 hours  methocarbamol 500 milliGRAM(s) Oral every 8 hours  ondansetron   Disintegrating Tablet 4 milliGRAM(s) Oral every 6 hours  oxyCODONE    IR 5 milliGRAM(s) Oral every 4 hours PRN  oxyCODONE    IR 10 milliGRAM(s) Oral every 4 hours PRN  pregabalin 75 milliGRAM(s) Oral every 8 hours    Anticoagulation:    OTHER:  atorvastatin 40 milliGRAM(s) Oral at bedtime  bisacodyl 5 milliGRAM(s) Oral every 12 hours PRN  brimonidine 0.2% Ophthalmic Solution 1 Drop(s) Both EYES every 8 hours  dorzolamide 2% Ophthalmic Solution 1 Drop(s) Both EYES every 8 hours  famotidine    Tablet 20 milliGRAM(s) Oral two times a day PRN  insulin lispro (ADMELOG) corrective regimen sliding scale   SubCutaneous Before meals and at bedtime  latanoprost 0.005% Ophthalmic Solution 1 Drop(s) Both EYES at bedtime  oxybutynin 5 milliGRAM(s) Oral two times a day PRN  senna 2 Tablet(s) Oral at bedtime  simethicone 80 milliGRAM(s) Chew every 8 hours PRN    IVF:    CULTURES:    RADIOLOGY & ADDITIONAL TESTS:      ASSESSMENT:  61 yo F PMH polio (during childhood, chronic b/l LE weakness), DM, asthma, HLD, s/p L5-S1 alek laminectomy & TLIF, L4-S2/AI fusion (10/26 w/ Dr Manzanares) presents w/ b/l LE pain since surgery. Recently LLE pain became worse, associated w/ RLE numbness and b/l LE tingling with worsened LBP. Pt ambulates w/ walker at baseline. Pt admitted for pain management and possible intervention for CSF collection at prior surgical bed seen on MRI 12/6. s/p lumbar wound revision 12/12.     CSF LEAK    Acute poliomyelitis, unspecified    Acute posthemorrhagic anemia    ADVERSE EFFECT OF GLUCOCORT/SYNTH ANALOG, INIT    Anxiety disorder, unspecified    Arthrodesis status    Cauda equina syndrome    Congenital spondylolisthesis    COVID-19    Disease of spinal cord, unspecified    Encounter for other preprocedural examination    Encounter for prophylactic measures, unspecified    Essential (primary) hypertension    Foot drop, unspecified foot    Long term (current) use of oral hypoglycemic drugs    Migraine, unspecified, not intractable, without status migrainosus    Nutritional anemia, unspecified    Orthostatic hypotension    OTH IV DISC DISPLACEMENT LUMBAR RGN    Other acute postprocedural pain    Other idiopathic scoliosis, site unspecified    Other intervertebral disc degeneration, lumbar region    Other osteoporosis without current pathological fracture    Other specified disorders of bone density and structure, unspecified site    Other specified symptoms and signs involving the circulatory and respiratory systems    Other spondylosis with myelopathy, thoracic region    Other spondylosis with radiculopathy, lumbar region    Personal history of COVID-19    Personal history of other diseases of the digestive system    Personal history of other diseases of the musculoskeletal system and connective tissue    Personal history of other specified conditions    Polyneuropathy, unspecified    Polyp of colon    Postpolio syndrome    Pure hypercholesterolemia, unspecified    Radiculopathy, lumbar region    Radiculopathy, lumbosacral region    Repeated falls    Spinal stenosis, lumbar region without neurogenic claudication    Spondylolisthesis, lumbosacral region    Spondylolysis, lumbar region    Spondylosis without myelopathy or radiculopathy, cervical region    Spondylosis without myelopathy or radiculopathy, lumbar region    Spondylosis without myelopathy or radiculopathy, lumbosacral region    Trigger finger, unspecified finger    Type 2 diabetes mellitus without complications    Unspecified abdominal pain    Unspecified asthma, uncomplicated    Unspecified cord compression    Unspecified fracture of unspecified lumbar vertebra, subsequent encounter for fracture with nonunion    Unspecified fracture of unspecified patella, initial encounter for closed fracture    Unspecified osteoarthritis, unspecified site    Handoff    MEWS Score    Poliomyelitis    DM (diabetes mellitus)    Asthma    HTN (hypertension)    Hypercholesterolemia    Colonic polyp    Osteoarthritis    Trigger finger    Patella fracture    COVID-19 virus infection    History of colitis    History of urinary incontinence    Spondylolisthesis, lumbar region    Lumbar spondylosis    Lumbar surgical wound fluid collection    Lumbar surgical wound fluid collection    Lumbar spondylosis    DM (diabetes mellitus)    Hypercholesterolemia    HTN (hypertension)    Poliomyelitis    Prophylactic measure    Anemia    Preoperative clearance    Exploration, wound, lumbar    History of shoulder surgery    DM (diabetes mellitus)    Room Service Assist    [email or web address]        PLAN:  NEURO:  - neuro checks/vital signs q4hrs   - pain control PRN: tylenol, oxycodone, robaxin, standing lyrica    - MRI L spine completed 12/6; CSF fluid collection within the laminectomy/foraminotomy bed of L5-S1 w/ severe spinal canal stenosis and thecal sac compression w/ R >L neural foraminal narrowing (non-enhancing)   - HMVx1, JPx1  - no post-op imaging needed     CARDIO:  - SBP goal normotensive   - atorvastatin 40mg QD (home med)   - ASA 81mg QD - on hold     PULM:  - NC PRN, IS    GI:  - ADAT   - bowel regimen   - famotidine 20mg BID (home med)     RENAL:  - IVF until tolerating PO   - cont home oxybutinin    ENDO:  - ISS    ID:  - afebrile, no leukocytosis   - postop Ancef  - f/u OR cx     HEME:  - SCDs DVT ppx    Dispo: regional status, full code, dispo pending home no needs     Assessment and plan d/w Dr. Manzanares

## 2022-12-14 NOTE — DISCHARGE NOTE PROVIDER - NSDCFUADDAPPT_GEN_ALL_CORE_FT
Please follow up with Dr. Manzanares    Please follow up with Dr. Osman if needed for pain management    Please follow up with your primary care doctor  Please follow up with Dr. Manzanares on 12/28/22 at 10:30AM    Please follow up with Dr. Jamil outpatient from Plastic Surgery    Please follow up with Dr. Osman if needed for pain management    Please follow up with your primary care doctor

## 2022-12-14 NOTE — DISCHARGE NOTE PROVIDER - CARE PROVIDER_API CALL
Jhon Manzanares; MS)  Neurosurgery  [address] [facility] [place] [address]  Phone: [phone]  Fax: [phone]  Follow Up Time:    Ulysses Jamil)  Plastic Surgery Surgery  [address] [facility], [address]  [place] [address]  Phone: [phone]  Fax: [phone]  Follow Up Time:     Jhon Manzanares; MS)  Neurosurgery  [address] [place] [address]  Phone: [phone]  Fax: [phone]  Follow Up Time:     Jovanni Osman)  Anesthesiology; Pain Medicine  [address] [place] [address]  Phone: [phone]  Fax: [phone]  Follow Up Time:

## 2022-12-14 NOTE — DISCHARGE NOTE PROVIDER - NSDCFUADDINST_GEN_ALL_CORE_FT
Neurosurgery follow up appointment date/time:  - are staples/sutures in place?  - what day should staples/sutures be removed (POD 10-14)?  - please call the office to confirm appointment: [phone]    Wound Care:  - you may shower   - pat dry incision after showering   - leave incision uncovered, open to air     Devices:  -     Drains/Lines:  - PICC in place? ID follow up? (Paper Rx for: antibiotics, heparin flush, weekly lab draws)  - ANA LUISA in place? Management?  - prado in place? Management/Urology follow up?  - Tracheostomy?  - PEG tube?      Activity:  - fatigue is common after surgery, rest if you feel tired   - no bending, lifting, twisting or heavy lifting   - walking is recommended, ambulate as tolerated  - you may shower when you get home, keep your incision dry  - no bathing   - no driving within 24 hours of anesthesia or while taking prescription pain medications   - keep hydrated, drink plenty of water     Inpatient consults:  - Pain management: follow up with Dr. Osman if needed outpatient     Please also follow up with your primary care doctor.     Pain Expectations:  - pain after surgery is expected  - please take pain meds as prescribed     Medications:  - changes to home meds (ex. AED's)?  - new meds?  - pain meds?  - when can antiplatelets or anticoagulants be restarted?  - were adverse affects of meds discussed with patients?   - pain medications can cause constipation, you should eat a high fiber diet and may take a stool softener while on pain meds   - Avoid taking Advil (ibuprofen), Motrin (naproxen), or Aspirin for pain as they can cause bleeding     Call the office or come to ED if:  - wound has drainage or bleeding, increased redness or pain at incision site, neurological change, fever (>101), chills, night sweats, syncope, nausea/vomiting      Playback:  - see [facility] for a copy of your discharge paperwork     WITHIN 24 HOURS OF DISCHARGE, PLEASE CONTACT NEURO PA  WITH ANY QUESTIONS OR CONCERNS: [phone]   OTHERWISE, PLEASE CALL THE OFFICE WITH ANY QUESTIONS OR CONCERNS: [phone] Yes Neurosurgery follow up appointment date/time: 12/28/22 at 10:30AM  - sutures in place and will be removed in the office   - please call the office to confirm appointment: [phone]    Wound Care:  - you may shower   - pat dry incision after showering   - leave incision uncovered, open to air   - sutures in place will be removed at your follow up visit     Activity:  - fatigue is common after surgery, rest if you feel tired   - no bending, lifting, twisting or heavy lifting   - walking is recommended, ambulate as tolerated  - you may shower when you get home, keep your incision dry  - no bathing   - no driving within 24 hours of anesthesia or while taking prescription pain medications   - keep hydrated, drink plenty of water     Inpatient consults:  - Pain management: follow up with Dr. Osman if needed outpatient   - Plastic surgery: follow up outpatient for suture removal     Please also follow up with your primary care doctor.     Pain Expectations:  - pain after surgery is expected  - please take pain meds as prescribed     Medications:  - HOLD home Aspirin until follow up with outpatient PCP  - continue home meds as prescribed (Atorvastatin, Ezetimibe, Solifenacin, opthalmic solution)   - pain meds: Lyrica 75mg every 8 hours (WEAN OFF as tolerated), Robaxin 500mg every 8 hours as needed for muscle spasm, Oxycodone 5mg every 6 hours as needed for severe pain, Tylenol as needed for mild to moderate pain    - pain medications can cause constipation, you should eat a high fiber diet and may take a stool softener while on pain meds   - Avoid taking Advil (ibuprofen), Motrin (naproxen), or Aspirin for pain as they can cause bleeding     Call the office or come to ED if:  - wound has drainage or bleeding, increased redness or pain at incision site, neurological change, fever (>101), chills, night sweats, syncope, nausea/vomiting      Playback:  - see [facility] for a copy of your discharge paperwork     WITHIN 24 HOURS OF DISCHARGE, PLEASE CONTACT NEURO PA  WITH ANY QUESTIONS OR CONCERNS: [phone]   OTHERWISE, PLEASE CALL THE OFFICE WITH ANY QUESTIONS OR CONCERNS: [phone]

## 2022-12-14 NOTE — DISCHARGE NOTE PROVIDER - NSDCMRMEDTOKEN_GEN_ALL_CORE_FT
acetaminophen 500 mg oral tablet: 2 tab(s) orally every 8 hours  Alphagan P 0.1% ophthalmic solution: 1 dose(s) to each affected eye every 8 hours  atorvastatin 40 mg oral tablet: 1 tab(s) orally once a day  bisacodyl 5 mg oral delayed release tablet: 1 tab(s) orally every 12 hours, As needed, Constipation  diclofenac 1% topical gel:   dorzolamide 2% ophthalmic solution: 1 dose(s) to each affected eye every 8 hours  ezetimibe 10 mg oral tablet: 1 tab(s) orally once a day  famotidine 20 mg oral tablet: 1 tab(s) orally every 12 hours, As needed, Dyspepsia  insulin lispro 100 units/mL injectable solution: Insulin sliding scale: 4 times a day (before meals and at bedtime)  latanoprost 0.005% ophthalmic solution: 1 dose(s) to each affected eye every 8 hours  methocarbamol 500 mg oral tablet: 1 tab(s) orally every 8 hours, As needed, Muscle Spasm  pregabalin 50 mg oral capsule: 1 cap(s) orally 3 times a day  senna leaf extract oral tablet: 2 tab(s) orally once a day (at bedtime)  simethicone 80 mg oral tablet, chewable: 1 tab(s) orally every 8 hours, As needed, Gas  solifenacin 5 mg oral tablet: 1 tab(s) orally once a day  traMADol 50 mg oral tablet: 0.5 tab(s) orally every 6 hours, As needed, Moderate Pain (4 - 6)  traMADol 50 mg oral tablet: 1 tab(s) orally every 6 hours, As needed, Severe Pain (7 - 10)  Vitamin D3 50 mcg (2000 intl units) oral tablet: 1 tab(s) orally once a day   acetaminophen 500 mg oral tablet: 2 tab(s) orally every 8 hours, As Needed  Alphagan P 0.1% ophthalmic solution: 1 dose(s) to each affected eye every 8 hours  atorvastatin 40 mg oral tablet: 1 tab(s) orally once a day  dorzolamide 2% ophthalmic solution: 1 dose(s) to each affected eye every 8 hours  ezetimibe 10 mg oral tablet: 1 tab(s) orally once a day  famotidine 20 mg oral tablet: 1 tab(s) orally every 12 hours, As needed, Dyspepsia  latanoprost 0.005% ophthalmic solution: 1 dose(s) to each affected eye every 8 hours  methocarbamol 500 mg oral tablet: 1 tab(s) orally every 8 hours, As Needed -for muscle spasm MDD:3 tabs    m2b MEDS TO BEDS  oxyCODONE 5 mg oral tablet: 1 tab(s) orally every 6 hours, As Needed -Moderate Pain (4 - 6) - for severe pain MDD:4 tabs    m2b MEDS TO BEDS  polyethylene glycol 3350 oral powder for reconstitution: 17 gram(s) orally once a day  pregabalin 75 mg oral capsule: 1 cap(s) orally every 8 hours MDD:3 tabs    m2b MEDS TO BEDS  simethicone 80 mg oral tablet, chewable: 1 tab(s) orally every 8 hours, As needed, Gas  solifenacin 5 mg oral tablet: 1 tab(s) orally once a day  Vitamin D3 50 mcg (2000 intl units) oral tablet: 1 tab(s) orally once a day   acetaminophen 500 mg oral tablet: 2 tab(s) orally every 8 hours, As Needed  Alphagan P 0.1% ophthalmic solution: 1 dose(s) to each affected eye every 8 hours  atorvastatin 40 mg oral tablet: 1 tab(s) orally once a day  dorzolamide 2% ophthalmic solution: 1 dose(s) to each affected eye every 8 hours  ezetimibe 10 mg oral tablet: 1 tab(s) orally once a day  famotidine 20 mg oral tablet: 1 tab(s) orally every 12 hours, As needed, Dyspepsia  latanoprost 0.005% ophthalmic solution: 1 dose(s) to each affected eye every 8 hours  methocarbamol 500 mg oral tablet: 1 tab(s) orally every 8 hours, As Needed for muscle spasm  oxyCODONE 5 mg oral tablet: 1 tab(s) orally every 6 hours, As Needed -Moderate Pain (4 - 6) - for severe pain MDD:4 tabs    m2b MEDS TO BEDS  polyethylene glycol 3350 oral powder for reconstitution: 17 gram(s) orally once a day  pregabalin 75 mg oral capsule: 1 cap(s) orally every 8 hours MDD:3 tabs    m2b MEDS TO BEDS  simethicone 80 mg oral tablet, chewable: 1 tab(s) orally every 8 hours, As needed, Gas  solifenacin 5 mg oral tablet: 1 tab(s) orally once a day  Vitamin D3 50 mcg (2000 intl units) oral tablet: 1 tab(s) orally once a day

## 2022-12-14 NOTE — DISCHARGE NOTE PROVIDER - NSDCCPCAREPLAN_GEN_ALL_CORE_FT
PRINCIPAL DISCHARGE DIAGNOSIS  Diagnosis: Lumbar surgical wound fluid collection  Assessment and Plan of Treatment:       SECONDARY DISCHARGE DIAGNOSES  Diagnosis: DM (diabetes mellitus)  Assessment and Plan of Treatment:     Diagnosis: Hypercholesterolemia  Assessment and Plan of Treatment:     Diagnosis: HTN (hypertension)  Assessment and Plan of Treatment:     Diagnosis: Poliomyelitis  Assessment and Plan of Treatment:     Diagnosis: Anemia  Assessment and Plan of Treatment:     Diagnosis: Lumbar spondylosis  Assessment and Plan of Treatment: L5

## 2022-12-14 NOTE — PROGRESS NOTE ADULT - SUBJECTIVE AND OBJECTIVE BOX
NEUROSURGERY PAIN MANAGEMENT CONSULT NOTE    Chief Complaint: back pain, RLE pain     HPI:  Pt is a 63 yo F PMH polio (during childhood, chronic b/l LE weakness), DM, asthma, HLD, s/p L5-S1 alek laminectomy & TLIF, L4-S2/AI fusion (10/26 w/ Dr Manzanares) presents w/ b/l LE pain since surgery. Pt said she had RLE pain since surgery that has worsened. Recently LLE pain became worse, associated w/ RLE numbness and b/l LE tingling with worsened LBP. Pt ambulates w/ walker at baseline. MRI from 12/6 shows CSF fluid collection within the laminectomy/foraminotomy bed of L5-S1 w/ severe spinal canal stenosis and thecal sac compression w/ R >L neural foraminal narrowing (non-enhancing). Denies bowel/bladder incontinence, recent falls, recent travel or known sick contact.      (07 Dec 2022 18:36)      PAST MEDICAL & SURGICAL HISTORY:  Poliomyelitis      DM (diabetes mellitus)      Asthma      HTN (hypertension)      Hypercholesterolemia      Colonic polyp      Osteoarthritis      Trigger finger      Patella fracture  left      COVID-19 virus infection      History of colitis      History of urinary incontinence      Spondylolisthesis, lumbar region      Lumbar spondylosis  L5      History of shoulder surgery          FAMILY HISTORY:      SOCIAL HISTORY:  [ ] Denies Smoking, Alcohol, or Drug Use    HOME MEDICATIONS:   Please refer to initial HNP    PAIN HOME MEDICATIONS:    Allergies    No Known Allergies    Intolerances        PAIN MEDICATIONS:  acetaminophen     Tablet .. 1000 milliGRAM(s) Oral every 8 hours  methocarbamol 500 milliGRAM(s) Oral every 8 hours  ondansetron   Disintegrating Tablet 4 milliGRAM(s) Oral every 6 hours  oxyCODONE    IR 5 milliGRAM(s) Oral every 4 hours PRN  oxyCODONE    IR 10 milliGRAM(s) Oral every 4 hours PRN  pregabalin 75 milliGRAM(s) Oral every 8 hours    Heme:    Antibiotics:    Cardiovascular:    GI:  bisacodyl 5 milliGRAM(s) Oral every 12 hours PRN  famotidine    Tablet 20 milliGRAM(s) Oral two times a day PRN  polyethylene glycol 3350 17 Gram(s) Oral daily  senna 2 Tablet(s) Oral at bedtime  simethicone 80 milliGRAM(s) Chew every 8 hours PRN    Endocrine:  atorvastatin 40 milliGRAM(s) Oral at bedtime  insulin lispro (ADMELOG) corrective regimen sliding scale   SubCutaneous Before meals and at bedtime    All Other Medications:  brimonidine 0.2% Ophthalmic Solution 1 Drop(s) Both EYES every 8 hours  dorzolamide 2% Ophthalmic Solution 1 Drop(s) Both EYES every 8 hours  latanoprost 0.005% Ophthalmic Solution 1 Drop(s) Both EYES at bedtime  oxybutynin 5 milliGRAM(s) Oral two times a day PRN      Vital Signs Last 24 Hrs  T(C): 36.8 (14 Dec 2022 08:28), Max: 36.8 (14 Dec 2022 08:28)  T(F): 98.2 (14 Dec 2022 08:28), Max: 98.2 (14 Dec 2022 08:28)  HR: 60 (14 Dec 2022 08:28) (60 - 71)  BP: 103/67 (14 Dec 2022 08:28) (102/64 - 123/80)  BP(mean): 79 (14 Dec 2022 08:28) (79 - 95)  RR: 16 (14 Dec 2022 08:28) (15 - 18)  SpO2: 96% (14 Dec 2022 08:28) (95% - 96%)    Parameters below as of 14 Dec 2022 08:28  Patient On (Oxygen Delivery Method): room air        LABS:                        11.2   7.69  )-----------( 323      ( 14 Dec 2022 10:00 )             34.1     12-14    141  |  103  |  10  ----------------------------<  147<H>  4.2   |  29  |  0.26<L>    Ca    9.1      14 Dec 2022 10:00  Phos  5.1     12-14  Mg     1.9     12-14            RADIOLOGY:    Drug Screen:        REVIEW OF SYSTEMS:  CONSTITUTIONAL: No fever or fatigue O/N.   EYES: No eye pain, visual disturbances  ENMT:  No difficulty hearing. No throat pain  NECK: No pain or stiffness  RESPIRATORY: No cough, wheezing; No shortness of breath  CARDIOVASCULAR: No chest pain, palpitations.   GASTROINTESTINAL: Pt reports passing gas. No bowel movements. No abdominal or epigastric pain. No nausea, vomiting. GENITOURINARY: No dysuria, frequency, or incontinence  NEUROLOGICAL: No headaches, loss of strength, numbness, or tremors. No dizzinesss or lightheadedness with pain medications.   MUSCULOSKELETAL: Incisional back pain. No joint pain or swelling; No muscle, or extremity pain    PAIN ASSESSMENT: pain better controlled post op     PHYSICAL EXAM  GENERAL: Laying in bed, NAD  Neuro: CN II-XII PERRL, EOMI  Cranial nerves grossly intact  Motor exam: MAEx4  Sensation intact to LT in UE/LE in 3 dermatomes  CHEST/LUNG: Clear to auscultation bilaterally; No rales, rhonchi, wheezing, or rubs  HEART: Regular rate and rhythm; No murmurs, rubs, or gallops  ABDOMEN: Soft, Nontender, Nondistended; Bowel sounds present  EXTREMITIES:  2+ Peripheral Pulses, No clubbing, cyanosis, or edema  SKIN: No rashes or lesions      ASSESSMENT:   63 yo F PMH polio (during childhood, chronic b/l LE weakness), DM, asthma, HLD, s/p L5-S1 alek laminectomy & TLIF, L4-S2/AI fusion (10/26 w/ Dr Manzanares) presents w/ b/l LE pain since surgery. Recently LLE pain became worse, associated w/ RLE numbness and b/l LE tingling with worsened LBP. Pt ambulates w/ walker at baseline. Pt admitted for pain management and possible intervention for CSF collection at prior surgical bed seen on MRI 12/6. s/p lumbar wound revision 12/12.       PLAN:   - Pain:  Tylenol to 1000mg every 8 hours s  Lyrica to 75mg every 8 hours  Robaxin to 500mg every 8 hours   Oxycodone 5/10mg every 4 hours as needed for moderate to severe pain     - Bowel regimen: Senna    - Nausea ppx: Zofran standing  - Functional Goals: Pt will get OOB with PT today. Pt will resume previous level of activity without impairment from surgery.   - Additional Consults: None recommended.   - Additional Labs/Imaging:  None recommended.   - Follow up, Discharge Planning: home when ready  - Pain Management follow up plan: will cont to follow    d/w Dr. Osman

## 2022-12-14 NOTE — DISCHARGE NOTE PROVIDER - ATTENDING ATTESTATION STATEMENT
PT RETURNED FROM RECOVERY PER TRANSPORTER ([facility]). A/O X 3. SKIN W/D. RESP E/U. PT DENIES ANY PAIN OR DISCOMFORT. \"I'M JUST HUGNRY AND THIRSTY\". SOUSE PRESENT. CONNECTED PT BACK TO BS. I have personally seen and examined the patient. I have collaborated with and supervised the

## 2022-12-15 LAB
ANION GAP SERPL CALC-SCNC: 7 MMOL/L — SIGNIFICANT CHANGE UP (ref 5–17)
BUN SERPL-MCNC: 11 MG/DL — SIGNIFICANT CHANGE UP (ref 7–23)
CALCIUM SERPL-MCNC: 8.8 MG/DL — SIGNIFICANT CHANGE UP (ref 8.4–10.5)
CHLORIDE SERPL-SCNC: 103 MMOL/L — SIGNIFICANT CHANGE UP (ref 96–108)
CO2 SERPL-SCNC: 27 MMOL/L — SIGNIFICANT CHANGE UP (ref 22–31)
CREAT SERPL-MCNC: 0.21 MG/DL — LOW (ref 0.5–1.3)
EGFR: 132 ML/MIN/1.73M2 — SIGNIFICANT CHANGE UP
GLUCOSE BLDC GLUCOMTR-MCNC: 142 MG/DL — HIGH (ref 70–99)
GLUCOSE BLDC GLUCOMTR-MCNC: 168 MG/DL — HIGH (ref 70–99)
GLUCOSE BLDC GLUCOMTR-MCNC: 171 MG/DL — HIGH (ref 70–99)
GLUCOSE BLDC GLUCOMTR-MCNC: 307 MG/DL — HIGH (ref 70–99)
GLUCOSE SERPL-MCNC: 164 MG/DL — HIGH (ref 70–99)
HCT VFR BLD CALC: 30.7 % — LOW (ref 34.5–45)
HGB BLD-MCNC: 10 G/DL — LOW (ref 11.5–15.5)
MAGNESIUM SERPL-MCNC: 1.9 MG/DL — SIGNIFICANT CHANGE UP (ref 1.6–2.6)
MCHC RBC-ENTMCNC: 29.5 PG — SIGNIFICANT CHANGE UP (ref 27–34)
MCHC RBC-ENTMCNC: 32.6 GM/DL — SIGNIFICANT CHANGE UP (ref 32–36)
MCV RBC AUTO: 90.6 FL — SIGNIFICANT CHANGE UP (ref 80–100)
NRBC # BLD: 0 /100 WBCS — SIGNIFICANT CHANGE UP (ref 0–0)
PHOSPHATE SERPL-MCNC: 5 MG/DL — HIGH (ref 2.5–4.5)
PLATELET # BLD AUTO: 279 K/UL — SIGNIFICANT CHANGE UP (ref 150–400)
POTASSIUM SERPL-MCNC: 4.5 MMOL/L — SIGNIFICANT CHANGE UP (ref 3.5–5.3)
POTASSIUM SERPL-SCNC: 4.5 MMOL/L — SIGNIFICANT CHANGE UP (ref 3.5–5.3)
RBC # BLD: 3.39 M/UL — LOW (ref 3.8–5.2)
RBC # FLD: 12.7 % — SIGNIFICANT CHANGE UP (ref 10.3–14.5)
SODIUM SERPL-SCNC: 137 MMOL/L — SIGNIFICANT CHANGE UP (ref 135–145)
WBC # BLD: 6.29 K/UL — SIGNIFICANT CHANGE UP (ref 3.8–10.5)
WBC # FLD AUTO: 6.29 K/UL — SIGNIFICANT CHANGE UP (ref 3.8–10.5)

## 2022-12-15 PROCEDURE — 99024 POSTOP FOLLOW-UP VISIT: CPT

## 2022-12-15 PROCEDURE — 99232 SBSQ HOSP IP/OBS MODERATE 35: CPT

## 2022-12-15 RX ORDER — ENOXAPARIN SODIUM 100 MG/ML
40 INJECTION SUBCUTANEOUS EVERY 24 HOURS
Refills: 0 | Status: DISCONTINUED | OUTPATIENT
Start: 2022-12-15 | End: 2022-12-16

## 2022-12-15 RX ORDER — ASPIRIN/CALCIUM CARB/MAGNESIUM 324 MG
81 TABLET ORAL DAILY
Refills: 0 | Status: DISCONTINUED | OUTPATIENT
Start: 2022-12-15 | End: 2022-12-16

## 2022-12-15 RX ADMIN — ONDANSETRON 4 MILLIGRAM(S): 8 TABLET, FILM COATED ORAL at 23:27

## 2022-12-15 RX ADMIN — Medication 1000 MILLIGRAM(S): at 23:11

## 2022-12-15 RX ADMIN — BRIMONIDINE TARTRATE 1 DROP(S): 2 SOLUTION/ DROPS OPHTHALMIC at 22:10

## 2022-12-15 RX ADMIN — Medication 1000 MILLIGRAM(S): at 06:17

## 2022-12-15 RX ADMIN — OXYCODONE HYDROCHLORIDE 10 MILLIGRAM(S): 5 TABLET ORAL at 22:30

## 2022-12-15 RX ADMIN — Medication 81 MILLIGRAM(S): at 22:07

## 2022-12-15 RX ADMIN — OXYCODONE HYDROCHLORIDE 10 MILLIGRAM(S): 5 TABLET ORAL at 13:11

## 2022-12-15 RX ADMIN — METHOCARBAMOL 500 MILLIGRAM(S): 500 TABLET, FILM COATED ORAL at 03:59

## 2022-12-15 RX ADMIN — DORZOLAMIDE HYDROCHLORIDE 1 DROP(S): 20 SOLUTION/ DROPS OPHTHALMIC at 13:12

## 2022-12-15 RX ADMIN — OXYCODONE HYDROCHLORIDE 10 MILLIGRAM(S): 5 TABLET ORAL at 01:20

## 2022-12-15 RX ADMIN — Medication 1000 MILLIGRAM(S): at 05:17

## 2022-12-15 RX ADMIN — Medication 75 MILLIGRAM(S): at 13:12

## 2022-12-15 RX ADMIN — Medication 1000 MILLIGRAM(S): at 22:08

## 2022-12-15 RX ADMIN — Medication 2: at 17:20

## 2022-12-15 RX ADMIN — DORZOLAMIDE HYDROCHLORIDE 1 DROP(S): 20 SOLUTION/ DROPS OPHTHALMIC at 05:17

## 2022-12-15 RX ADMIN — LATANOPROST 1 DROP(S): 0.05 SOLUTION/ DROPS OPHTHALMIC; TOPICAL at 22:10

## 2022-12-15 RX ADMIN — OXYCODONE HYDROCHLORIDE 10 MILLIGRAM(S): 5 TABLET ORAL at 00:20

## 2022-12-15 RX ADMIN — ENOXAPARIN SODIUM 40 MILLIGRAM(S): 100 INJECTION SUBCUTANEOUS at 22:08

## 2022-12-15 RX ADMIN — Medication 1000 MILLIGRAM(S): at 14:11

## 2022-12-15 RX ADMIN — BRIMONIDINE TARTRATE 1 DROP(S): 2 SOLUTION/ DROPS OPHTHALMIC at 13:12

## 2022-12-15 RX ADMIN — Medication 75 MILLIGRAM(S): at 05:17

## 2022-12-15 RX ADMIN — DORZOLAMIDE HYDROCHLORIDE 1 DROP(S): 20 SOLUTION/ DROPS OPHTHALMIC at 22:10

## 2022-12-15 RX ADMIN — OXYCODONE HYDROCHLORIDE 10 MILLIGRAM(S): 5 TABLET ORAL at 06:17

## 2022-12-15 RX ADMIN — Medication 2: at 12:41

## 2022-12-15 RX ADMIN — OXYCODONE HYDROCHLORIDE 10 MILLIGRAM(S): 5 TABLET ORAL at 14:11

## 2022-12-15 RX ADMIN — Medication 75 MILLIGRAM(S): at 22:08

## 2022-12-15 RX ADMIN — OXYCODONE HYDROCHLORIDE 10 MILLIGRAM(S): 5 TABLET ORAL at 05:17

## 2022-12-15 RX ADMIN — Medication 8: at 22:09

## 2022-12-15 RX ADMIN — METHOCARBAMOL 500 MILLIGRAM(S): 500 TABLET, FILM COATED ORAL at 09:20

## 2022-12-15 RX ADMIN — METHOCARBAMOL 500 MILLIGRAM(S): 500 TABLET, FILM COATED ORAL at 17:20

## 2022-12-15 RX ADMIN — Medication 1000 MILLIGRAM(S): at 13:11

## 2022-12-15 RX ADMIN — BRIMONIDINE TARTRATE 1 DROP(S): 2 SOLUTION/ DROPS OPHTHALMIC at 05:18

## 2022-12-15 RX ADMIN — SENNA PLUS 2 TABLET(S): 8.6 TABLET ORAL at 22:07

## 2022-12-15 RX ADMIN — ATORVASTATIN CALCIUM 40 MILLIGRAM(S): 80 TABLET, FILM COATED ORAL at 22:07

## 2022-12-15 RX ADMIN — OXYCODONE HYDROCHLORIDE 10 MILLIGRAM(S): 5 TABLET ORAL at 21:30

## 2022-12-15 NOTE — PROGRESS NOTE ADULT - PROBLEM SELECTOR PLAN 5
Hgb 9-10 baseline  - likely anemia of chronic inflammation, had some blood loss anemia last hospitalization but now back at baseline.  - stable
Hgb 9-10 baseline  - likely anemia of chronic inflammation, had some blood loss anemia last hospitalization but now back at baseline.  - 10.7 this AM.
Hgb 9-10 baseline  - likely anemia of chronic inflammation, had some blood loss anemia last hospitalization but now back at baseline.  - 10.7 this AM.

## 2022-12-15 NOTE — PROCEDURE NOTE - GENERAL PROCEDURE DETAILS
Site prepped and cleaned. Anchoring sutures removed. Drain removed without resistance, tip of catheter visualized. Gauze/tegaderm placed over drain exit site. Patient tolerated procedure well.

## 2022-12-15 NOTE — PROGRESS NOTE ADULT - PROBLEM SELECTOR PLAN 7
Lovenox if no IR procedure while awaiting OR  Dispo: pending intervention and post op PT assessment.
DVT ppx: resume Lovenox when appropriate post op  Dispo: home with no needs after drain removal
DVT ppx: resume Lovenox when appropriate post op  Dispo: pending intervention and post op PT assessment.
DVT ppx: resume Lovenox when appropriate post op  Dispo: home with no needs after drain removal
Lovenox if no IR procedure while awaiting OR  Dispo: pending intervention and post op PT assessment.
DVT ppx: resume Lovenox when appropriate post op  Dispo: home with no needs after drain removal

## 2022-12-15 NOTE — PROGRESS NOTE ADULT - PROBLEM SELECTOR PLAN 6
Baseline b/l LE weakness  - post OP PT.

## 2022-12-15 NOTE — PROGRESS NOTE ADULT - SUBJECTIVE AND OBJECTIVE BOX
NEUROSURGERY PAIN MANAGEMENT CONSULT NOTE    Chief Complaint: back pain, RLE pain     HPI:  Pt is a 63 yo F PMH polio (during childhood, chronic b/l LE weakness), DM, asthma, HLD, s/p L5-S1 alek laminectomy & TLIF, L4-S2/AI fusion (10/26 w/ Dr Manzanares) presents w/ b/l LE pain since surgery. Pt said she had RLE pain since surgery that has worsened. Recently LLE pain became worse, associated w/ RLE numbness and b/l LE tingling with worsened LBP. Pt ambulates w/ walker at baseline. MRI from 12/6 shows CSF fluid collection within the laminectomy/foraminotomy bed of L5-S1 w/ severe spinal canal stenosis and thecal sac compression w/ R >L neural foraminal narrowing (non-enhancing). Denies bowel/bladder incontinence, recent falls, recent travel or known sick contact.      (07 Dec 2022 18:36)      PAST MEDICAL & SURGICAL HISTORY:  Poliomyelitis      DM (diabetes mellitus)      Asthma      HTN (hypertension)      Hypercholesterolemia      Colonic polyp      Osteoarthritis      Trigger finger      Patella fracture  left      COVID-19 virus infection      History of colitis      History of urinary incontinence      Spondylolisthesis, lumbar region      Lumbar spondylosis  L5      History of shoulder surgery          FAMILY HISTORY:      SOCIAL HISTORY:  [ ] Denies Smoking, Alcohol, or Drug Use    HOME MEDICATIONS:   Please refer to initial HNP    PAIN HOME MEDICATIONS:    Allergies    No Known Allergies    Intolerances        PAIN MEDICATIONS:  acetaminophen     Tablet .. 1000 milliGRAM(s) Oral every 8 hours  methocarbamol 500 milliGRAM(s) Oral every 8 hours  ondansetron   Disintegrating Tablet 4 milliGRAM(s) Oral every 6 hours  oxyCODONE    IR 5 milliGRAM(s) Oral every 4 hours PRN  oxyCODONE    IR 10 milliGRAM(s) Oral every 4 hours PRN  pregabalin 75 milliGRAM(s) Oral every 8 hours    Heme:    Antibiotics:    Cardiovascular:    GI:  bisacodyl 5 milliGRAM(s) Oral every 12 hours PRN  famotidine    Tablet 20 milliGRAM(s) Oral two times a day PRN  polyethylene glycol 3350 17 Gram(s) Oral daily  senna 2 Tablet(s) Oral at bedtime  simethicone 80 milliGRAM(s) Chew every 8 hours PRN    Endocrine:  atorvastatin 40 milliGRAM(s) Oral at bedtime  insulin lispro (ADMELOG) corrective regimen sliding scale   SubCutaneous Before meals and at bedtime    All Other Medications:  brimonidine 0.2% Ophthalmic Solution 1 Drop(s) Both EYES every 8 hours  dorzolamide 2% Ophthalmic Solution 1 Drop(s) Both EYES every 8 hours  latanoprost 0.005% Ophthalmic Solution 1 Drop(s) Both EYES at bedtime  oxybutynin 5 milliGRAM(s) Oral two times a day PRN      Vital Signs Last 24 Hrs  T(C): 36.3 (15 Dec 2022 09:02), Max: 36.8 (14 Dec 2022 20:28)  T(F): 97.3 (15 Dec 2022 09:02), Max: 98.3 (14 Dec 2022 20:28)  HR: 62 (15 Dec 2022 09:02) (60 - 67)  BP: 101/65 (15 Dec 2022 09:02) (92/59 - 118/78)  BP(mean): 91 (15 Dec 2022 05:10) (81 - 91)  RR: 18 (15 Dec 2022 09:02) (16 - 18)  SpO2: 97% (15 Dec 2022 09:02) (95% - 97%)    Parameters below as of 15 Dec 2022 09:02  Patient On (Oxygen Delivery Method): room air        LABS:                        10.0   6.29  )-----------( 279      ( 15 Dec 2022 05:30 )             30.7     12-15    137  |  103  |  11  ----------------------------<  164<H>  4.5   |  27  |  0.21<L>    Ca    8.8      15 Dec 2022 05:30  Phos  5.0     12-15  Mg     1.9     12-15            RADIOLOGY:    Drug Screen:        REVIEW OF SYSTEMS:  CONSTITUTIONAL: No fever or fatigue O/N.   EYES: No eye pain, visual disturbances  ENMT:  No difficulty hearing. No throat pain  NECK: No pain or stiffness  RESPIRATORY: No cough, wheezing; No shortness of breath  CARDIOVASCULAR: No chest pain, palpitations.   GASTROINTESTINAL: Pt reports passing gas. No bowel movements. No abdominal or epigastric pain. No nausea, vomiting. GENITOURINARY: No dysuria, frequency, or incontinence  NEUROLOGICAL: No headaches, loss of strength, numbness, or tremors. No dizzinesss or lightheadedness with pain medications.   MUSCULOSKELETAL: Incisional back pain. No joint pain or swelling; No muscle, or extremity pain    PAIN ASSESSMENT: pain improved, well controlled today      PHYSICAL EXAM  GENERAL: Laying in bed, NAD  Neuro: CN II-XII PERRL, EOMI  Cranial nerves grossly intact  Motor exam: MAEx4  Sensation intact to LT in UE/LE in 3 dermatomes  CHEST/LUNG: Clear to auscultation bilaterally; No rales, rhonchi, wheezing, or rubs  HEART: Regular rate and rhythm; No murmurs, rubs, or gallops  ABDOMEN: Soft, Nontender, Nondistended; Bowel sounds present  EXTREMITIES:  2+ Peripheral Pulses, No clubbing, cyanosis, or edema  SKIN: No rashes or lesions      ASSESSMENT:   63 yo F PMH polio (during childhood, chronic b/l LE weakness), DM, asthma, HLD, s/p L5-S1 alek laminectomy & TLIF, L4-S2/AI fusion (10/26 w/ Dr Manzanares) presents w/ b/l LE pain since surgery. Recently LLE pain became worse, associated w/ RLE numbness and b/l LE tingling with worsened LBP. Pt ambulates w/ walker at baseline. Pt admitted for pain management and possible intervention for CSF collection at prior surgical bed seen on MRI 12/6. s/p lumbar wound revision 12/12.       PLAN:   - Pain:  Tylenol to 1000mg every 8 hours s  Lyrica to 75mg every 8 hours  Robaxin to 500mg every 8 hours   Oxycodone 5/10mg every 4 hours as needed for moderate to severe pain     - Bowel regimen: Senna    - Nausea ppx: Zofran standing  - Functional Goals: Pt will get OOB with PT today. Pt will resume previous level of activity without impairment from surgery.   - Additional Consults: None recommended.   - Additional Labs/Imaging:  None recommended.   - Follow up, Discharge Planning: home when ready  - Pain Management follow up plan: will cont to follow    d/w Dr. Osman

## 2022-12-15 NOTE — PROGRESS NOTE ADULT - PROBLEM SELECTOR PLAN 1
s/p L4-S2/AI fusion (10/26) now with fluid collection seen on MRI  - s/p lumbar wound revision  - pain control per neurosurgery team, continue with aggressive bowel regimen while on opioids   - PT post op recommended home no needs  - pending Hemovac removal

## 2022-12-15 NOTE — PROGRESS NOTE ADULT - PROBLEM SELECTOR PLAN 3
- c/w atorvastatin
- c/w atrovastatin.
- c/w atrovastatin
- c/w atrovastatin.
- c/w atorvastatin
- c/w atrovastatin.

## 2022-12-15 NOTE — PROGRESS NOTE ADULT - PROBLEM SELECTOR PROBLEM 1
Lumbar spondylosis

## 2022-12-15 NOTE — PROGRESS NOTE ADULT - PROBLEM SELECTOR PLAN 2
Last A1c 6.3, on Januvia and Metformin at home  - last admission had steroid induced hypoglycemia but improved after taper and basal and premeal insulin was discontinued  - c/w MISS alone, trend FSG.  - Diet induced fluctuations but majority of FSG at acceptable range
Last A1c 6.3, on Januvia and Metformin at home  - last admission had steroid induced hypoglycemia but improved after taper and basal and premeal insulin was discontinued  - c/w MISS alone, trend FSG.  - Diet induced fluctuations but majority of FSG at acceptable range, carb consistent diet
Last A1c 6.3, on Januvia and Metformin at home  - last admission had steroid induced hypoglycemia but improved after taper and basal and premeal insulin was discontinued  - c/w MISS alone, trend FSG.  - Diet induced fluctuations but majority of FSG at acceptable range, carb consistent diet
Last A1c 6.3, on Januvia and Metformin at home  - last admission had steroid induced hypoglycemia but improved after taper and basal and premeal insulin was discontinued  - c/w MISS alone, trend FSG.  - Elevated this AM at 245, but diet induced.   - Counseling provided.
Last A1c 6.3, on Januvia and Metformin at home  - last admission had steroid induced hypoglycemia but improved after taper and basal and premeal insulin was discontinued  - c/w MISS alone, trend FSG.  - Elevated this AM at 245, but diet induced.   - Counseling provided.
Last A1c 6.3, on Januvia and Metformin at home  - last admission had steroid induced hypoglycemia but improved after taper and basal and premeal insulin was discontinued  - c/w MISS alone, trend FSG.  - Diet induced fluctuations but majority of FSG at acceptable range, carb consistent diet

## 2022-12-15 NOTE — PROGRESS NOTE ADULT - ASSESSMENT
61 yo F PMH polio (during childhood, chronic b/l LE weakness), DM, asthma, HLD, s/p L5-S1 alek laminectomy & TLIF, L4-S2/AI fusion (10/26 w/ Dr Manzanares) presents w/ b/l LE pain since surgery. Recently LLE pain became worse, associated w/ RLE numbness and b/l LE tingling with worsened LBP. Pt ambulates w/ walker at baseline. Pt admitted for pain management and possible intervention for CSF collection at prior surgical bed seen on MRI 12/6. 
61 yo F PMH polio (during childhood, chronic b/l LE weakness), DM, asthma, HLD, s/p L5-S1 alek laminectomy & TLIF, L4-S2/AI fusion (10/26 w/ Dr Manzanares) presents w/ b/l LE pain since surgery. Recently LLE pain became worse, associated w/ RLE numbness and b/l LE tingling with worsened LBP. Pt ambulates w/ walker at baseline. Pt admitted for pain management and possible intervention for CSF collection at prior surgical bed seen on MRI 12/6. Now s/p lumbar wound revision
61 yo F PMH polio (during childhood, chronic b/l LE weakness), DM, asthma, HLD, s/p L5-S1 alek laminectomy & TLIF, L4-S2/AI fusion (10/26 w/ Dr Manzanares) presents w/ b/l LE pain since surgery. Recently LLE pain became worse, associated w/ RLE numbness and b/l LE tingling with worsened LBP. Pt ambulates w/ walker at baseline. Pt admitted for pain management and possible intervention for CSF collection at prior surgical bed seen on MRI 12/6. 
61 yo F PMH polio (during childhood, chronic b/l LE weakness), DM, asthma, HLD, s/p L5-S1 alek laminectomy & TLIF, L4-S2/AI fusion (10/26 w/ Dr Manzanares) presents w/ b/l LE pain since surgery. Recently LLE pain became worse, associated w/ RLE numbness and b/l LE tingling with worsened LBP. Pt ambulates w/ walker at baseline. Pt admitted for pain management and possible intervention for CSF collection at prior surgical bed seen on MRI 12/6. Now s/p lumbar wound revision
63 yo F PMH polio (during childhood, chronic b/l LE weakness), DM, asthma, HLD, s/p L5-S1 alek laminectomy & TLIF, L4-S2/AI fusion (10/26 w/ Dr Manzanares) presents w/ b/l LE pain since surgery. Recently LLE pain became worse, associated w/ RLE numbness and b/l LE tingling with worsened LBP. Pt ambulates w/ walker at baseline. Pt admitted for pain management and possible intervention for CSF collection at prior surgical bed seen on MRI 12/6. Now s/p lumbar wound revision
63 yo F PMH polio (during childhood, chronic b/l LE weakness), DM, asthma, HLD, s/p L5-S1 alek laminectomy & TLIF, L4-S2/AI fusion (10/26 w/ Dr Manzanares) presents w/ b/l LE pain since surgery. Recently LLE pain became worse, associated w/ RLE numbness and b/l LE tingling with worsened LBP. Pt ambulates w/ walker at baseline. Pt admitted for pain management and possible intervention for CSF collection at prior surgical bed seen on MRI 12/6.

## 2022-12-15 NOTE — PROGRESS NOTE ADULT - PROBLEM SELECTOR PROBLEM 3
Hypercholesterolemia

## 2022-12-15 NOTE — PROGRESS NOTE ADULT - SUBJECTIVE AND OBJECTIVE BOX
SUBJECTIVE:  Patient seen and examined at bedside.  Feeling well, pain remains well controlled.  Last BM yesterday    ROS:  Denies fevers, chills, headache, vision changes, cough, SOB, chest pain, Abdominal pain, N/V, dysuria or new rash.  All other ROS negative except as above    Vital Signs Last 12 Hrs  T(F): 97.3 (12-15-22 @ 09:02), Max: 98.1 (12-15-22 @ 05:10)  HR: 62 (12-15-22 @ 09:02) (60 - 62)  BP: 101/65 (12-15-22 @ 09:02) (101/65 - 118/78)  BP(mean): 91 (12-15-22 @ 05:10) (91 - 91)  RR: 18 (12-15-22 @ 09:02) (16 - 18)  SpO2: 97% (12-15-22 @ 09:02) (95% - 97%)  I&O's Summary    14 Dec 2022 07:01  -  15 Dec 2022 07:00  --------------------------------------------------------  IN: 0 mL / OUT: 60 mL / NET: -60 mL        PHYSICAL EXAM:  Constitutional: NAD, comfortable in bed.  HEENT: PERRLA, EOMI, no conjunctival pallor or scleral icterus, MMM  Neck: Supple, no JVD  Respiratory: CTA B/L. No w/r/r.   Cardiovascular: RRR, normal S1 and S2, no m/r/g.   Gastrointestinal: +BS, soft NTND, no guarding or rebound tenderness, no palpable masses   Extremities: wwp; no cyanosis, clubbing or edema.   Back: Hemovac in place  Vascular: Pulses equal and strong throughout.   Neurological: AAOx3, no CN deficits, b/l LE weakness  Skin: No gross skin abnormalities or rashes        LABS:                        10.0   6.29  )-----------( 279      ( 15 Dec 2022 05:30 )             30.7     12-15    137  |  103  |  11  ----------------------------<  164<H>  4.5   |  27  |  0.21<L>    Ca    8.8      15 Dec 2022 05:30  Phos  5.0     12-15  Mg     1.9     12-15              RADIOLOGY & ADDITIONAL TESTS:  No new imaging    MEDICATIONS  (STANDING):  acetaminophen     Tablet .. 1000 milliGRAM(s) Oral every 8 hours  atorvastatin 40 milliGRAM(s) Oral at bedtime  brimonidine 0.2% Ophthalmic Solution 1 Drop(s) Both EYES every 8 hours  dorzolamide 2% Ophthalmic Solution 1 Drop(s) Both EYES every 8 hours  insulin lispro (ADMELOG) corrective regimen sliding scale   SubCutaneous Before meals and at bedtime  latanoprost 0.005% Ophthalmic Solution 1 Drop(s) Both EYES at bedtime  methocarbamol 500 milliGRAM(s) Oral every 8 hours  ondansetron   Disintegrating Tablet 4 milliGRAM(s) Oral every 6 hours  polyethylene glycol 3350 17 Gram(s) Oral daily  pregabalin 75 milliGRAM(s) Oral every 8 hours  senna 2 Tablet(s) Oral at bedtime    MEDICATIONS  (PRN):  bisacodyl 5 milliGRAM(s) Oral every 12 hours PRN Constipation  famotidine    Tablet 20 milliGRAM(s) Oral two times a day PRN Dyspepsia  oxybutynin 5 milliGRAM(s) Oral two times a day PRN Bladder spasms  oxyCODONE    IR 5 milliGRAM(s) Oral every 4 hours PRN Moderate Pain (4 - 6)  oxyCODONE    IR 10 milliGRAM(s) Oral every 4 hours PRN Severe Pain (7 - 10)  simethicone 80 milliGRAM(s) Chew every 8 hours PRN Gas

## 2022-12-15 NOTE — PROGRESS NOTE ADULT - SUBJECTIVE AND OBJECTIVE BOX
HPI:  Pt is a 61 yo F PMH polio (during childhood, chronic b/l LE weakness), DM, asthma, HLD, s/p L5-S1 alek laminectomy & TLIF, L4-S2/AI fusion (10/26 w/ Dr Manzanares) presents w/ b/l LE pain since surgery. Pt said she had RLE pain since surgery that has worsened. Recently LLE pain became worse, associated w/ RLE numbness and b/l LE tingling with worsened LBP. Pt ambulates w/ walker at baseline. MRI from 12/6 shows CSF fluid collection within the laminectomy/foraminotomy bed of L5-S1 w/ severe spinal canal stenosis and thecal sac compression w/ R >L neural foraminal narrowing (non-enhancing). Denies bowel/bladder incontinence, recent falls, recent travel or known sick contact.      (07 Dec 2022 18:36)    HOSPITAL COURSE:   12/7: admitted for pain control and possible CSF leak repair  12/8: JAMAL overnight, Pt c/o of LLE pain, pt NPO IR consulted for possible drainage of fluid collection.   12/9: c/o RLE pain o/n.   12/10: JAMAL overnight. Pain well controlled. OR on Monday for wound revision.   12/11: JAMAL overnight. pain better controlled. pending OR monday.   12/12: JAMAL overnight. s/p lumbar wound revision.   12/13: JAMAL overnight. Pain controlled with PRN IV dilaudid and ERAS meds. HMV and ANA LUISA remain in place. Pain controlled with oral medications. PT/OT reccomends home with no needs.   12/14: JAMAL overnight, neuro stable. Pain controlled. F/u HMV and ANA LUISA output.  12/15: JAMAL overnight, neuro stable. Pain controlled. Pending drain removal and dispo.        OVERNIGHT EVENTS:  Vital Signs Last 24 Hrs  T(C): 36.8 (14 Dec 2022 20:28), Max: 36.8 (14 Dec 2022 08:28)  T(F): 98.3 (14 Dec 2022 20:28), Max: 98.3 (14 Dec 2022 20:28)  HR: 67 (14 Dec 2022 20:28) (60 - 67)  BP: 108/68 (14 Dec 2022 20:28) (92/59 - 108/68)  BP(mean): 81 (14 Dec 2022 20:28) (79 - 81)  RR: 16 (14 Dec 2022 20:28) (16 - 16)  SpO2: 95% (14 Dec 2022 20:28) (95% - 96%)    Parameters below as of 14 Dec 2022 20:28  Patient On (Oxygen Delivery Method): room air        I&O's Summary    13 Dec 2022 07:01  -  14 Dec 2022 07:00  --------------------------------------------------------  IN: 720 mL / OUT: 2415 mL / NET: -1695 mL    14 Dec 2022 07:01  -  15 Dec 2022 04:31  --------------------------------------------------------  IN: 0 mL / OUT: 25 mL / NET: -25 mL        PHYSICAL EXAM:  General: NAD, pt is comfortably resting in bed, A&O x3, on RA  HEENT: CN II-XII grossly intact, PERRL 3mm, EOMI b/l, face symmetric, tongue midline, neck FROM  Cardiovascular: RRR, normal S1 and S2   Respiratory: lungs CTAB, no wheezing, rhonchi, or crackles   GI: normoactive BS to auscultation, abd soft, NTND   Neuro: no aphasia, speech clear, no dysmetria, no pronator drift  b/l UE 5/5, b/l HF/KF 2/5, L DF/PF 4+/5, R DF/PF 4-/5   sensation intact to light touch throughout   Extremities: distal pulses 2+ x4  Wound/incision: posterior lumbar incision C/D/I  Drains: HMV x1, ANA LUISA x1     TUBES/LINES:  [] CVC  [] A-line  [] Lumbar Drain  [] Ventriculostomy  [X] Other - wound drains     DIET:  [] NPO  [X] Mechanical  [] Tube feeds      LABS:                        11.2   7.69  )-----------( 323      ( 14 Dec 2022 10:00 )             34.1     12-14    141  |  103  |  10  ----------------------------<  147<H>  4.2   |  29  |  0.26<L>    Ca    9.1      14 Dec 2022 10:00  Phos  5.1     12-14  Mg     1.9     12-14              CAPILLARY BLOOD GLUCOSE      POCT Blood Glucose.: 236 mg/dL (14 Dec 2022 22:06)  POCT Blood Glucose.: 168 mg/dL (14 Dec 2022 16:55)  POCT Blood Glucose.: 148 mg/dL (14 Dec 2022 13:31)  POCT Blood Glucose.: 113 mg/dL (14 Dec 2022 07:03)      Drug Levels: [] N/A    CSF Analysis: [] N/A      Allergies    No Known Allergies    Intolerances      MEDICATIONS:  Antibiotics:    Neuro:  acetaminophen     Tablet .. 1000 milliGRAM(s) Oral every 8 hours  methocarbamol 500 milliGRAM(s) Oral every 8 hours  ondansetron   Disintegrating Tablet 4 milliGRAM(s) Oral every 6 hours  oxyCODONE    IR 5 milliGRAM(s) Oral every 4 hours PRN  oxyCODONE    IR 10 milliGRAM(s) Oral every 4 hours PRN  pregabalin 75 milliGRAM(s) Oral every 8 hours    Anticoagulation:    OTHER:  atorvastatin 40 milliGRAM(s) Oral at bedtime  bisacodyl 5 milliGRAM(s) Oral every 12 hours PRN  brimonidine 0.2% Ophthalmic Solution 1 Drop(s) Both EYES every 8 hours  dorzolamide 2% Ophthalmic Solution 1 Drop(s) Both EYES every 8 hours  famotidine    Tablet 20 milliGRAM(s) Oral two times a day PRN  insulin lispro (ADMELOG) corrective regimen sliding scale   SubCutaneous Before meals and at bedtime  latanoprost 0.005% Ophthalmic Solution 1 Drop(s) Both EYES at bedtime  oxybutynin 5 milliGRAM(s) Oral two times a day PRN  polyethylene glycol 3350 17 Gram(s) Oral daily  senna 2 Tablet(s) Oral at bedtime  simethicone 80 milliGRAM(s) Chew every 8 hours PRN    IVF:    CULTURES:    RADIOLOGY & ADDITIONAL TESTS:      ASSESSMENT:  61 yo F PMH polio (during childhood, chronic b/l LE weakness), DM, asthma, HLD, s/p L5-S1 alek laminectomy & TLIF, L4-S2/AI fusion (10/26 w/ Dr Manzanares) presents w/ b/l LE pain since surgery. Recently LLE pain became worse, associated w/ RLE numbness and b/l LE tingling with worsened LBP. Pt ambulates w/ walker at baseline. Pt admitted for pain management and possible intervention for CSF collection at prior surgical bed seen on MRI 12/6. s/p lumbar wound revision 12/12.       CSF LEAK    Acute poliomyelitis, unspecified    Acute posthemorrhagic anemia    ADVERSE EFFECT OF GLUCOCORT/SYNTH ANALOG, INIT    Anxiety disorder, unspecified    Arthrodesis status    Cauda equina syndrome    Congenital spondylolisthesis    COVID-19    Disease of spinal cord, unspecified    Encounter for other preprocedural examination    Encounter for prophylactic measures, unspecified    Essential (primary) hypertension    Foot drop, unspecified foot    Long term (current) use of oral hypoglycemic drugs    Migraine, unspecified, not intractable, without status migrainosus    Nutritional anemia, unspecified    Orthostatic hypotension    OTH IV DISC DISPLACEMENT LUMBAR RGN    Other acute postprocedural pain    Other idiopathic scoliosis, site unspecified    Other intervertebral disc degeneration, lumbar region    Other osteoporosis without current pathological fracture    Other specified disorders of bone density and structure, unspecified site    Other specified symptoms and signs involving the circulatory and respiratory systems    Other spondylosis with myelopathy, thoracic region    Other spondylosis with radiculopathy, lumbar region    Personal history of COVID-19    Personal history of other diseases of the digestive system    Personal history of other diseases of the musculoskeletal system and connective tissue    Personal history of other specified conditions    Polyneuropathy, unspecified    Polyp of colon    Postpolio syndrome    Pure hypercholesterolemia, unspecified    Radiculopathy, lumbar region    Radiculopathy, lumbosacral region    Repeated falls    Spinal stenosis, lumbar region without neurogenic claudication    Spondylolisthesis, lumbosacral region    Spondylolysis, lumbar region    Spondylosis without myelopathy or radiculopathy, cervical region    Spondylosis without myelopathy or radiculopathy, lumbar region    Spondylosis without myelopathy or radiculopathy, lumbosacral region    Trigger finger, unspecified finger    Type 2 diabetes mellitus without complications    Unspecified abdominal pain    Unspecified asthma, uncomplicated    Unspecified cord compression    Unspecified fracture of unspecified lumbar vertebra, subsequent encounter for fracture with nonunion    Unspecified fracture of unspecified patella, initial encounter for closed fracture    Unspecified osteoarthritis, unspecified site    Handoff    MEWS Score    Poliomyelitis    DM (diabetes mellitus)    Asthma    HTN (hypertension)    Hypercholesterolemia    Colonic polyp    Osteoarthritis    Trigger finger    Patella fracture    COVID-19 virus infection    History of colitis    History of urinary incontinence    Spondylolisthesis, lumbar region    Lumbar spondylosis    Lumbar surgical wound fluid collection    Lumbar surgical wound fluid collection    Exploration, wound, lumbar    Lumbar surgical wound fluid collection    Lumbar spondylosis    DM (diabetes mellitus)    Hypercholesterolemia    HTN (hypertension)    Poliomyelitis    Prophylactic measure    Anemia    Preoperative clearance    Exploration, wound, lumbar    History of shoulder surgery    DM (diabetes mellitus)    Room Service Assist    Lumbar spondylosis    DM (diabetes mellitus)    Hypercholesterolemia    HTN (hypertension)    Poliomyelitis    Anemia    [id number]        PLAN:  NEURO:  - neuro checks/vital signs q4hrs   - pain control PRN: tylenol, oxycodone, robaxin, standing lyrica    - MRI L spine completed 12/6; CSF fluid collection within the laminectomy/foraminotomy bed of L5-S1 w/ severe spinal canal stenosis and thecal sac compression w/ R >L neural foraminal narrowing (non-enhancing)   - HMVx1, JPx1  - no post-op imaging needed     CARDIO:  - SBP goal normotensive   - atorvastatin 40mg QD (home med)   - ASA 81mg QD - on hold     PULM:  - NC PRN, IS    GI:  - ADAT   - bowel regimen   - famotidine 20mg BID (home med)     RENAL:  - IVF until tolerating PO   - cont home oxybutinin    ENDO:  - ISS    ID:  - afebrile, no leukocytosis   - postop Ancef  - f/u OR cx     HEME:  - SCDs DVT ppx    Dispo: regional status, full code, dispo pending home no needs     Assessment and plan d/w Dr. Manzanares

## 2022-12-15 NOTE — PROGRESS NOTE ADULT - PROBLEM SELECTOR PLAN 4
Previously was on Amlodipine, Irbesartan/HCTZ as outpatient but was stopped on last admission due to orthostatic hypotension and normal BPs  - BP wnl would not restart medications at this time  - PCP follow up after DC.

## 2022-12-16 VITALS
OXYGEN SATURATION: 96 % | SYSTOLIC BLOOD PRESSURE: 112 MMHG | RESPIRATION RATE: 18 BRPM | TEMPERATURE: 98 F | DIASTOLIC BLOOD PRESSURE: 68 MMHG | HEART RATE: 69 BPM

## 2022-12-16 LAB
ANION GAP SERPL CALC-SCNC: 9 MMOL/L — SIGNIFICANT CHANGE UP (ref 5–17)
BUN SERPL-MCNC: 9 MG/DL — SIGNIFICANT CHANGE UP (ref 7–23)
CALCIUM SERPL-MCNC: 9 MG/DL — SIGNIFICANT CHANGE UP (ref 8.4–10.5)
CHLORIDE SERPL-SCNC: 104 MMOL/L — SIGNIFICANT CHANGE UP (ref 96–108)
CO2 SERPL-SCNC: 26 MMOL/L — SIGNIFICANT CHANGE UP (ref 22–31)
CREAT SERPL-MCNC: 0.2 MG/DL — LOW (ref 0.5–1.3)
EGFR: 134 ML/MIN/1.73M2 — SIGNIFICANT CHANGE UP
GLUCOSE BLDC GLUCOMTR-MCNC: 151 MG/DL — HIGH (ref 70–99)
GLUCOSE BLDC GLUCOMTR-MCNC: 171 MG/DL — HIGH (ref 70–99)
GLUCOSE SERPL-MCNC: 133 MG/DL — HIGH (ref 70–99)
HCT VFR BLD CALC: 32.8 % — LOW (ref 34.5–45)
HGB BLD-MCNC: 10.6 G/DL — LOW (ref 11.5–15.5)
MAGNESIUM SERPL-MCNC: 1.9 MG/DL — SIGNIFICANT CHANGE UP (ref 1.6–2.6)
MCHC RBC-ENTMCNC: 28.9 PG — SIGNIFICANT CHANGE UP (ref 27–34)
MCHC RBC-ENTMCNC: 32.3 GM/DL — SIGNIFICANT CHANGE UP (ref 32–36)
MCV RBC AUTO: 89.4 FL — SIGNIFICANT CHANGE UP (ref 80–100)
NRBC # BLD: 0 /100 WBCS — SIGNIFICANT CHANGE UP (ref 0–0)
PHOSPHATE SERPL-MCNC: 5 MG/DL — HIGH (ref 2.5–4.5)
PLATELET # BLD AUTO: 299 K/UL — SIGNIFICANT CHANGE UP (ref 150–400)
POTASSIUM SERPL-MCNC: 4.1 MMOL/L — SIGNIFICANT CHANGE UP (ref 3.5–5.3)
POTASSIUM SERPL-SCNC: 4.1 MMOL/L — SIGNIFICANT CHANGE UP (ref 3.5–5.3)
RBC # BLD: 3.67 M/UL — LOW (ref 3.8–5.2)
RBC # FLD: 12.7 % — SIGNIFICANT CHANGE UP (ref 10.3–14.5)
SODIUM SERPL-SCNC: 139 MMOL/L — SIGNIFICANT CHANGE UP (ref 135–145)
WBC # BLD: 6.03 K/UL — SIGNIFICANT CHANGE UP (ref 3.8–10.5)
WBC # FLD AUTO: 6.03 K/UL — SIGNIFICANT CHANGE UP (ref 3.8–10.5)

## 2022-12-16 PROCEDURE — 85610 PROTHROMBIN TIME: CPT

## 2022-12-16 PROCEDURE — 97161 PT EVAL LOW COMPLEX 20 MIN: CPT

## 2022-12-16 PROCEDURE — 86850 RBC ANTIBODY SCREEN: CPT

## 2022-12-16 PROCEDURE — 87635 SARS-COV-2 COVID-19 AMP PRB: CPT

## 2022-12-16 PROCEDURE — 84702 CHORIONIC GONADOTROPIN TEST: CPT

## 2022-12-16 PROCEDURE — 84100 ASSAY OF PHOSPHORUS: CPT

## 2022-12-16 PROCEDURE — C1889: CPT

## 2022-12-16 PROCEDURE — 97116 GAIT TRAINING THERAPY: CPT

## 2022-12-16 PROCEDURE — 86880 COOMBS TEST DIRECT: CPT

## 2022-12-16 PROCEDURE — 83735 ASSAY OF MAGNESIUM: CPT

## 2022-12-16 PROCEDURE — 85730 THROMBOPLASTIN TIME PARTIAL: CPT

## 2022-12-16 PROCEDURE — 71045 X-RAY EXAM CHEST 1 VIEW: CPT

## 2022-12-16 PROCEDURE — 86900 BLOOD TYPING SEROLOGIC ABO: CPT

## 2022-12-16 PROCEDURE — 85025 COMPLETE CBC W/AUTO DIFF WBC: CPT

## 2022-12-16 PROCEDURE — U0005: CPT

## 2022-12-16 PROCEDURE — 86922 COMPATIBILITY TEST ANTIGLOB: CPT

## 2022-12-16 PROCEDURE — 80048 BASIC METABOLIC PNL TOTAL CA: CPT

## 2022-12-16 PROCEDURE — 36415 COLL VENOUS BLD VENIPUNCTURE: CPT

## 2022-12-16 PROCEDURE — 86905 BLOOD TYPING RBC ANTIGENS: CPT

## 2022-12-16 PROCEDURE — 85027 COMPLETE CBC AUTOMATED: CPT

## 2022-12-16 PROCEDURE — 86901 BLOOD TYPING SEROLOGIC RH(D): CPT

## 2022-12-16 PROCEDURE — 86335 IMMUNFIX E-PHORSIS/URINE/CSF: CPT

## 2022-12-16 PROCEDURE — 86870 RBC ANTIBODY IDENTIFICATION: CPT

## 2022-12-16 PROCEDURE — 82962 GLUCOSE BLOOD TEST: CPT

## 2022-12-16 PROCEDURE — U0003: CPT

## 2022-12-16 RX ORDER — METHOCARBAMOL 500 MG/1
1 TABLET, FILM COATED ORAL
Qty: 21 | Refills: 0
Start: 2022-12-16 | End: 2022-12-22

## 2022-12-16 RX ORDER — METHOCARBAMOL 500 MG/1
1 TABLET, FILM COATED ORAL
Qty: 0 | Refills: 0 | DISCHARGE
Start: 2022-12-16

## 2022-12-16 RX ORDER — DICLOFENAC SODIUM 30 MG/G
0 GEL TOPICAL
Qty: 0 | Refills: 0 | DISCHARGE

## 2022-12-16 RX ORDER — OXYCODONE HYDROCHLORIDE 5 MG/1
1 TABLET ORAL
Qty: 28 | Refills: 0
Start: 2022-12-16 | End: 2022-12-22

## 2022-12-16 RX ORDER — ACETAMINOPHEN 500 MG
2 TABLET ORAL
Qty: 0 | Refills: 0 | DISCHARGE
Start: 2022-12-16

## 2022-12-16 RX ORDER — POLYETHYLENE GLYCOL 3350 17 G/17G
17 POWDER, FOR SOLUTION ORAL
Qty: 0 | Refills: 0 | DISCHARGE
Start: 2022-12-16

## 2022-12-16 RX ADMIN — Medication 75 MILLIGRAM(S): at 13:34

## 2022-12-16 RX ADMIN — OXYCODONE HYDROCHLORIDE 10 MILLIGRAM(S): 5 TABLET ORAL at 10:48

## 2022-12-16 RX ADMIN — Medication 1000 MILLIGRAM(S): at 05:39

## 2022-12-16 RX ADMIN — BRIMONIDINE TARTRATE 1 DROP(S): 2 SOLUTION/ DROPS OPHTHALMIC at 05:39

## 2022-12-16 RX ADMIN — OXYCODONE HYDROCHLORIDE 10 MILLIGRAM(S): 5 TABLET ORAL at 06:44

## 2022-12-16 RX ADMIN — OXYCODONE HYDROCHLORIDE 10 MILLIGRAM(S): 5 TABLET ORAL at 11:48

## 2022-12-16 RX ADMIN — METHOCARBAMOL 500 MILLIGRAM(S): 500 TABLET, FILM COATED ORAL at 09:28

## 2022-12-16 RX ADMIN — Medication 2: at 07:33

## 2022-12-16 RX ADMIN — Medication 1000 MILLIGRAM(S): at 06:39

## 2022-12-16 RX ADMIN — DORZOLAMIDE HYDROCHLORIDE 1 DROP(S): 20 SOLUTION/ DROPS OPHTHALMIC at 13:35

## 2022-12-16 RX ADMIN — Medication 75 MILLIGRAM(S): at 05:39

## 2022-12-16 RX ADMIN — OXYCODONE HYDROCHLORIDE 10 MILLIGRAM(S): 5 TABLET ORAL at 05:43

## 2022-12-16 RX ADMIN — DORZOLAMIDE HYDROCHLORIDE 1 DROP(S): 20 SOLUTION/ DROPS OPHTHALMIC at 05:38

## 2022-12-16 RX ADMIN — METHOCARBAMOL 500 MILLIGRAM(S): 500 TABLET, FILM COATED ORAL at 01:11

## 2022-12-16 RX ADMIN — BRIMONIDINE TARTRATE 1 DROP(S): 2 SOLUTION/ DROPS OPHTHALMIC at 13:35

## 2022-12-16 RX ADMIN — Medication 1000 MILLIGRAM(S): at 13:34

## 2022-12-16 RX ADMIN — Medication 81 MILLIGRAM(S): at 13:34

## 2022-12-16 RX ADMIN — Medication 1000 MILLIGRAM(S): at 14:34

## 2022-12-16 NOTE — PROGRESS NOTE ADULT - PROVIDER SPECIALTY LIST ADULT
Neurosurgery
Pain Medicine
Neurosurgery
Pain Medicine
Neurosurgery
Hospitalist

## 2022-12-16 NOTE — PROGRESS NOTE ADULT - SUBJECTIVE AND OBJECTIVE BOX
HPI:  Pt is a 63 yo F PMH polio (during childhood, chronic b/l LE weakness), DM, asthma, HLD, s/p L5-S1 alek laminectomy & TLIF, L4-S2/AI fusion (10/26 w/ Dr Manzanares) presents w/ b/l LE pain since surgery. Pt said she had RLE pain since surgery that has worsened. Recently LLE pain became worse, associated w/ RLE numbness and b/l LE tingling with worsened LBP. Pt ambulates w/ walker at baseline. MRI from 12/6 shows CSF fluid collection within the laminectomy/foraminotomy bed of L5-S1 w/ severe spinal canal stenosis and thecal sac compression w/ R >L neural foraminal narrowing (non-enhancing). Denies bowel/bladder incontinence, recent falls, recent travel or known sick contact.       HOSPITAL COURSE:   12/7: admitted for pain control and possible CSF leak repair  12/8: JAMAL overnight, Pt c/o of LLE pain, pt NPO IR consulted for possible drainage of fluid collection.   12/9: c/o RLE pain o/n.   12/10: JAMAL overnight. Pain well controlled. OR on Monday for wound revision.   12/11: JAMAL overnight. pain better controlled. pending OR monday.   12/12: JAMAL overnight. s/p lumbar wound revision.   12/13: JAMAL overnight. Pain controlled with PRN IV dilaudid and ERAS meds. HMV and ANA LUISA remain in place. Pain controlled with oral medications. PT/OT reccomends home with no needs.   12/14: JAMAL overnight, neuro stable. Pain controlled. F/u HMV and ANA LUISA output.  12/15: JAMAL overnight, neuro stable. Pain controlled. Pending drain removal and dispo.    12/16: POD 4. JAMAL overnight.       Vital Signs Last 24 Hrs  T(C): 36.7 (15 Dec 2022 21:04), Max: 36.7 (15 Dec 2022 05:10)  T(F): 98 (15 Dec 2022 21:04), Max: 98.1 (15 Dec 2022 05:10)  HR: 72 (15 Dec 2022 21:04) (60 - 72)  BP: 104/72 (15 Dec 2022 21:04) (101/65 - 118/78)  BP(mean): 91 (15 Dec 2022 05:10) (91 - 91)  RR: 18 (15 Dec 2022 21:04) (16 - 18)  SpO2: 94% (15 Dec 2022 21:04) (94% - 97%)    Parameters below as of 15 Dec 2022 21:04  Patient On (Oxygen Delivery Method): room air        I&O's Detail    14 Dec 2022 07:01  -  15 Dec 2022 07:00  --------------------------------------------------------  IN:  Total IN: 0 mL    OUT:    Accordian (mL): 25 mL    Bulb (mL): 35 mL  Total OUT: 60 mL    Total NET: -60 mL      15 Dec 2022 07:01  -  16 Dec 2022 03:10  --------------------------------------------------------  IN:  Total IN: 0 mL    OUT:    Accordian (mL): 0 mL    Bulb (mL): 10 mL  Total OUT: 10 mL    Total NET: -10 mL        I&O's Summary    14 Dec 2022 07:01  -  15 Dec 2022 07:00  --------------------------------------------------------  IN: 0 mL / OUT: 60 mL / NET: -60 mL    15 Dec 2022 07:01  -  16 Dec 2022 03:10  --------------------------------------------------------  IN: 0 mL / OUT: 10 mL / NET: -10 mL        PHYSICAL EXAM:  General: NAD, pt is comfortably resting in bed, A&O x3, on RA  HEENT: CN II-XII grossly intact, PERRL 3mm, EOMI b/l, face symmetric, tongue midline, neck FROM  Cardiovascular: RRR, normal S1 and S2   Respiratory: lungs CTAB, no wheezing, rhonchi, or crackles   GI: normoactive BS to auscultation, abd soft, NTND   Neuro: no aphasia, speech clear, no dysmetria, no pronator drift  b/l UE 5/5, b/l HF/KF 2/5, L DF/PF 4+/5, R DF/PF 4-/5   sensation intact to light touch throughout   Extremities: distal pulses 2+ x4  Wound/incision: posterior lumbar incision C/D/I  Drains: ANA LUISA x1     TUBES/LINES:  [] CVC  [] A-line  [] Lumbar Drain  [] Ventriculostomy  [] Other    DIET:  [] NPO  [] Mechanical  [] Tube feeds    LABS:                        10.0   6.29  )-----------( 279      ( 15 Dec 2022 05:30 )             30.7     12-15    137  |  103  |  11  ----------------------------<  164<H>  4.5   |  27  |  0.21<L>    Ca    8.8      15 Dec 2022 05:30  Phos  5.0     12-15  Mg     1.9     12-15              CAPILLARY BLOOD GLUCOSE      POCT Blood Glucose.: 307 mg/dL (15 Dec 2022 21:41)  POCT Blood Glucose.: 171 mg/dL (15 Dec 2022 17:01)  POCT Blood Glucose.: 168 mg/dL (15 Dec 2022 12:20)  POCT Blood Glucose.: 142 mg/dL (15 Dec 2022 06:53)      Drug Levels: [] N/A    CSF Analysis: [] N/A      Allergies    No Known Allergies    Intolerances      MEDICATIONS:  Antibiotics:    Neuro:  acetaminophen     Tablet .. 1000 milliGRAM(s) Oral every 8 hours  methocarbamol 500 milliGRAM(s) Oral every 8 hours  ondansetron   Disintegrating Tablet 4 milliGRAM(s) Oral every 6 hours  oxyCODONE    IR 5 milliGRAM(s) Oral every 4 hours PRN  oxyCODONE    IR 10 milliGRAM(s) Oral every 4 hours PRN  pregabalin 75 milliGRAM(s) Oral every 8 hours    Anticoagulation:  aspirin enteric coated 81 milliGRAM(s) Oral daily  enoxaparin Injectable 40 milliGRAM(s) SubCutaneous every 24 hours    OTHER:  atorvastatin 40 milliGRAM(s) Oral at bedtime  bisacodyl 5 milliGRAM(s) Oral every 12 hours PRN  brimonidine 0.2% Ophthalmic Solution 1 Drop(s) Both EYES every 8 hours  dorzolamide 2% Ophthalmic Solution 1 Drop(s) Both EYES every 8 hours  famotidine    Tablet 20 milliGRAM(s) Oral two times a day PRN  insulin lispro (ADMELOG) corrective regimen sliding scale   SubCutaneous Before meals and at bedtime  latanoprost 0.005% Ophthalmic Solution 1 Drop(s) Both EYES at bedtime  oxybutynin 5 milliGRAM(s) Oral two times a day PRN  polyethylene glycol 3350 17 Gram(s) Oral daily  senna 2 Tablet(s) Oral at bedtime  simethicone 80 milliGRAM(s) Chew every 8 hours PRN    IVF:    CULTURES:    RADIOLOGY & ADDITIONAL TESTS:      ASSESSMENT:  63 yo F PMH polio (during childhood, chronic b/l LE weakness), DM, asthma, HLD, s/p L5-S1 alek laminectomy & TLIF, L4-S2/AI fusion (10/26 w/ Dr Manzanares) presents w/ b/l LE pain since surgery. Recently LLE pain became worse, associated w/ RLE numbness and b/l LE tingling with worsened LBP. Pt ambulates w/ walker at baseline. Pt admitted for pain management and possible intervention for CSF collection at prior surgical bed seen on MRI 12/6. s/p lumbar wound revision 12/12.     CSF LEAK    Acute poliomyelitis, unspecified    Acute posthemorrhagic anemia    ADVERSE EFFECT OF GLUCOCORT/SYNTH ANALOG, INIT    Anxiety disorder, unspecified    Arthrodesis status    Cauda equina syndrome    Congenital spondylolisthesis    COVID-19    Disease of spinal cord, unspecified    Encounter for other preprocedural examination    Encounter for prophylactic measures, unspecified    Essential (primary) hypertension    Foot drop, unspecified foot    Long term (current) use of oral hypoglycemic drugs    Migraine, unspecified, not intractable, without status migrainosus    Nutritional anemia, unspecified    Orthostatic hypotension    OTH IV DISC DISPLACEMENT LUMBAR RGN    Other acute postprocedural pain    Other idiopathic scoliosis, site unspecified    Other intervertebral disc degeneration, lumbar region    Other osteoporosis without current pathological fracture    Other specified disorders of bone density and structure, unspecified site    Other specified symptoms and signs involving the circulatory and respiratory systems    Other spondylosis with myelopathy, thoracic region    Other spondylosis with radiculopathy, lumbar region    Personal history of COVID-19    Personal history of other diseases of the digestive system    Personal history of other diseases of the musculoskeletal system and connective tissue    Personal history of other specified conditions    Polyneuropathy, unspecified    Polyp of colon    Postpolio syndrome    Pure hypercholesterolemia, unspecified    Radiculopathy, lumbar region    Radiculopathy, lumbosacral region    Repeated falls    Spinal stenosis, lumbar region without neurogenic claudication    Spondylolisthesis, lumbosacral region    Spondylolysis, lumbar region    Spondylosis without myelopathy or radiculopathy, cervical region    Spondylosis without myelopathy or radiculopathy, lumbar region    Spondylosis without myelopathy or radiculopathy, lumbosacral region    Trigger finger, unspecified finger    Type 2 diabetes mellitus without complications    Unspecified abdominal pain    Unspecified asthma, uncomplicated    Unspecified cord compression    Unspecified fracture of unspecified lumbar vertebra, subsequent encounter for fracture with nonunion    Unspecified fracture of unspecified patella, initial encounter for closed fracture    Unspecified osteoarthritis, unspecified site    Handoff    MEWS Score    Poliomyelitis    DM (diabetes mellitus)    Asthma    HTN (hypertension)    Hypercholesterolemia    Colonic polyp    Osteoarthritis    Trigger finger    Patella fracture    COVID-19 virus infection    History of colitis    History of urinary incontinence    Spondylolisthesis, lumbar region    Lumbar spondylosis    Lumbar surgical wound fluid collection    Lumbar surgical wound fluid collection    Exploration, wound, lumbar    Lumbar surgical wound fluid collection    Lumbar spondylosis    DM (diabetes mellitus)    Hypercholesterolemia    HTN (hypertension)    Poliomyelitis    Prophylactic measure    Anemia    Preoperative clearance    Exploration, wound, lumbar    History of shoulder surgery    DM (diabetes mellitus)    Room Service Assist    Lumbar spondylosis    DM (diabetes mellitus)    Hypercholesterolemia    HTN (hypertension)    Poliomyelitis    Anemia    SysAdmin_[email or web address]        PLAN:  NEURO:  - neuro checks/vital signs q4hrs   - pain control PRN: tylenol, oxycodone, robaxin, standing lyrica    - MRI L spine completed 12/6; CSF fluid collection within the laminectomy/foraminotomy bed of L5-S1 w/ severe spinal canal stenosis and thecal sac compression w/ R >L neural foraminal narrowing (non-enhancing)   - HMVx1 d/c 12/15, JPx1  - no post-op imaging needed     CARDIO:  - SBP goal normotensive   - atorvastatin 40mg QD (home med)   - ASA 81mg QD (cardioprotective)- on hold     PULM:  - NC PRN, IS    GI:  - ADAT   - bowel regimen   - famotidine 20mg BID (home med)     RENAL:  - IVF until tolerating PO   - cont home oxybutinin    ENDO:  - ISS    ID:  - afebrile, no leukocytosis   - postop Ancef  - f/u OR cx     HEME:  - SCDs/SQL DVT ppx  - ASA81 cardioprotective home dose restarted 12/15    Dispo: regional status, full code, dispo pending home no needs     Assessment and plan d/w Dr. Manzanares         Assessment:  Present when checked    []  GCS  E   V  M     Heart Failure: []Acute, [] acute on chronic , []chronic  Heart Failure:  [] Diastolic (HFpEF), [] Systolic (HFrEF), []Combined (HFpEF and HFrEF), [] RHF, [] Pulm HTN, [] Other    [] DERICK, [] ATN, [] AIN, [] other  [] CKD1, [] CKD2, [] CKD 3, [] CKD 4, [] CKD 5, []ESRD    Encephalopathy: [] Metabolic, [] Hepatic, [] toxic, [] Neurological, [] Other    Abnormal Nurtitional Status: [] malnurtition (see nutrition note), [ ]underweight: BMI < 19, [] morbid obesity: BMI >40, [] Cachexia    [] Sepsis  [] hypovolemic shock,[] cardiogenic shock, [] hemorrhagic shock, [] neuogenic shock  [] Acute Respiratory Failure  []Cerebral edema, [] Brain compression/ herniation,   [] Functional quadriplegia  [] Acute blood loss anemia

## 2022-12-26 DIAGNOSIS — G96.00 CEREBROSPINAL FLUID LEAK, UNSPECIFIED: ICD-10-CM

## 2022-12-26 DIAGNOSIS — I73.9 PERIPHERAL VASCULAR DISEASE, UNSPECIFIED: ICD-10-CM

## 2022-12-26 DIAGNOSIS — I82.409 ACUTE EMBOLISM AND THROMBOSIS OF UNSPECIFIED DEEP VEINS OF UNSPECIFIED LOWER EXTREMITY: ICD-10-CM

## 2022-12-26 RX ORDER — BACITRACIN 500 [IU]/G
500 OINTMENT TOPICAL TWICE DAILY
Qty: 2 | Refills: 0 | Status: ACTIVE | COMMUNITY
Start: 2022-12-26 | End: 1900-01-01

## 2022-12-27 RX ORDER — OXYCODONE AND ACETAMINOPHEN 5; 325 MG/1; MG/1
5-325 TABLET ORAL
Qty: 60 | Refills: 0 | Status: ACTIVE | COMMUNITY
Start: 2022-12-27 | End: 1900-01-01

## 2023-01-03 ENCOUNTER — APPOINTMENT (OUTPATIENT)
Dept: ULTRASOUND IMAGING | Facility: CLINIC | Age: 63
End: 2023-01-03
Payer: MEDICARE

## 2023-01-03 ENCOUNTER — NON-APPOINTMENT (OUTPATIENT)
Age: 63
End: 2023-01-03

## 2023-01-03 ENCOUNTER — OUTPATIENT (OUTPATIENT)
Dept: OUTPATIENT SERVICES | Facility: HOSPITAL | Age: 63
LOS: 1 days | End: 2023-01-03
Payer: MEDICARE

## 2023-01-03 ENCOUNTER — APPOINTMENT (OUTPATIENT)
Dept: RADIOLOGY | Facility: CLINIC | Age: 63
End: 2023-01-03
Payer: MEDICARE

## 2023-01-03 DIAGNOSIS — Z98.1 ARTHRODESIS STATUS: ICD-10-CM

## 2023-01-03 DIAGNOSIS — G96.00 CEREBROSPINAL FLUID LEAK, UNSPECIFIED: ICD-10-CM

## 2023-01-03 DIAGNOSIS — Z00.8 ENCOUNTER FOR OTHER GENERAL EXAMINATION: ICD-10-CM

## 2023-01-03 DIAGNOSIS — I73.9 PERIPHERAL VASCULAR DISEASE, UNSPECIFIED: ICD-10-CM

## 2023-01-03 DIAGNOSIS — I82.409 ACUTE EMBOLISM AND THROMBOSIS OF UNSPECIFIED DEEP VEINS OF UNSPECIFIED LOWER EXTREMITY: ICD-10-CM

## 2023-01-03 DIAGNOSIS — Z98.890 OTHER SPECIFIED POSTPROCEDURAL STATES: Chronic | ICD-10-CM

## 2023-01-03 PROCEDURE — 93926 LOWER EXTREMITY STUDY: CPT | Mod: 26,RT

## 2023-01-03 PROCEDURE — 72100 X-RAY EXAM L-S SPINE 2/3 VWS: CPT | Mod: 26

## 2023-01-03 PROCEDURE — 93971 EXTREMITY STUDY: CPT | Mod: 26,RT

## 2023-01-03 PROCEDURE — 72100 X-RAY EXAM L-S SPINE 2/3 VWS: CPT

## 2023-01-03 PROCEDURE — 93971 EXTREMITY STUDY: CPT

## 2023-01-03 PROCEDURE — 93926 LOWER EXTREMITY STUDY: CPT

## 2023-01-04 DIAGNOSIS — G14 POSTPOLIO SYNDROME: ICD-10-CM

## 2023-01-04 RX ORDER — OXYCODONE AND ACETAMINOPHEN 5; 325 MG/1; MG/1
5-325 TABLET ORAL
Qty: 60 | Refills: 0 | Status: ACTIVE | COMMUNITY
Start: 2023-01-04 | End: 1900-01-01

## 2023-01-04 RX ORDER — DIAZEPAM 5 MG/1
5 TABLET ORAL
Qty: 90 | Refills: 0 | Status: ACTIVE | COMMUNITY
Start: 2023-01-04 | End: 1900-01-01

## 2023-01-10 ENCOUNTER — OUTPATIENT (OUTPATIENT)
Dept: OUTPATIENT SERVICES | Facility: HOSPITAL | Age: 63
LOS: 1 days | End: 2023-01-10

## 2023-01-10 ENCOUNTER — APPOINTMENT (OUTPATIENT)
Dept: MRI IMAGING | Facility: CLINIC | Age: 63
End: 2023-01-10
Payer: MEDICARE

## 2023-01-10 DIAGNOSIS — Z98.890 OTHER SPECIFIED POSTPROCEDURAL STATES: Chronic | ICD-10-CM

## 2023-01-10 PROCEDURE — 72158 MRI LUMBAR SPINE W/O & W/DYE: CPT | Mod: 26

## 2023-01-11 PROBLEM — I10 HIGH BLOOD PRESSURE: Status: RESOLVED | Noted: 2023-01-11 | Resolved: 2023-01-11

## 2023-01-11 PROBLEM — E11.9 DIABETES: Status: RESOLVED | Noted: 2023-01-11 | Resolved: 2023-01-11

## 2023-01-11 PROBLEM — M43.16 SPONDYLOLISTHESIS OF LUMBAR REGION: Status: ACTIVE | Noted: 2022-07-31

## 2023-01-11 PROBLEM — Z86.12 HISTORY OF POST-POLIO SYNDROME: Status: RESOLVED | Noted: 2023-01-11 | Resolved: 2023-01-11

## 2023-01-11 PROBLEM — Z87.39 HISTORY OF RHEUMATOID ARTHRITIS: Status: RESOLVED | Noted: 2022-07-29 | Resolved: 2023-01-11

## 2023-01-11 PROBLEM — E78.5 HYPERLIPIDEMIA: Status: RESOLVED | Noted: 2023-01-11 | Resolved: 2023-01-11

## 2023-01-11 PROBLEM — Z98.1 S/P LUMBAR FUSION: Status: ACTIVE | Noted: 2022-12-01

## 2023-01-11 NOTE — REVIEW OF SYSTEMS
[As Noted in HPI] : as noted in HPI [Leg Weakness] : leg weakness [Numbness] : numbness [Tingling] : tingling [Difficulty Walking] : difficulty walking [Negative] : Integumentary

## 2023-01-12 ENCOUNTER — APPOINTMENT (OUTPATIENT)
Dept: SPINE | Facility: CLINIC | Age: 63
End: 2023-01-12
Payer: MEDICARE

## 2023-01-12 ENCOUNTER — RESULT REVIEW (OUTPATIENT)
Age: 63
End: 2023-01-12

## 2023-01-12 ENCOUNTER — OUTPATIENT (OUTPATIENT)
Dept: OUTPATIENT SERVICES | Facility: HOSPITAL | Age: 63
LOS: 1 days | End: 2023-01-12
Payer: MEDICARE

## 2023-01-12 ENCOUNTER — NON-APPOINTMENT (OUTPATIENT)
Age: 63
End: 2023-01-12

## 2023-01-12 VITALS
HEART RATE: 84 BPM | DIASTOLIC BLOOD PRESSURE: 71 MMHG | WEIGHT: 115 LBS | BODY MASS INDEX: 23.18 KG/M2 | SYSTOLIC BLOOD PRESSURE: 101 MMHG | RESPIRATION RATE: 18 BRPM | TEMPERATURE: 98 F | HEIGHT: 59.06 IN | OXYGEN SATURATION: 98 %

## 2023-01-12 DIAGNOSIS — Z98.890 OTHER SPECIFIED POSTPROCEDURAL STATES: Chronic | ICD-10-CM

## 2023-01-12 DIAGNOSIS — Z87.39 PERSONAL HISTORY OF OTHER DISEASES OF THE MUSCULOSKELETAL SYSTEM AND CONNECTIVE TISSUE: ICD-10-CM

## 2023-01-12 DIAGNOSIS — I10 ESSENTIAL (PRIMARY) HYPERTENSION: ICD-10-CM

## 2023-01-12 DIAGNOSIS — E11.9 TYPE 2 DIABETES MELLITUS W/OUT COMPLICATIONS: ICD-10-CM

## 2023-01-12 DIAGNOSIS — M43.16 SPONDYLOLISTHESIS, LUMBAR REGION: ICD-10-CM

## 2023-01-12 DIAGNOSIS — Z86.12 PERSONAL HISTORY OF POLIOMYELITIS: ICD-10-CM

## 2023-01-12 DIAGNOSIS — E78.5 HYPERLIPIDEMIA, UNSPECIFIED: ICD-10-CM

## 2023-01-12 DIAGNOSIS — Z98.1 ARTHRODESIS STATUS: ICD-10-CM

## 2023-01-12 PROCEDURE — 72110 X-RAY EXAM L-2 SPINE 4/>VWS: CPT

## 2023-01-12 PROCEDURE — 99024 POSTOP FOLLOW-UP VISIT: CPT

## 2023-01-12 PROCEDURE — 72110 X-RAY EXAM L-2 SPINE 4/>VWS: CPT | Mod: 26

## 2023-01-12 NOTE — PHYSICAL EXAM
[General Appearance - Alert] : alert [General Appearance - In No Acute Distress] : in no acute distress [General Appearance - Well Nourished] : well nourished [General Appearance - Well-Appearing] : healthy appearing [Longitudinal] : longitudinal [Clean] : clean [Dry] : dry [Well-Healed] : well-healed [No Drainage] : without drainage [Normal Skin] : normal [Oriented To Time, Place, And Person] : oriented to person, place, and time [Impaired Insight] : insight and judgment were intact [Affect] : the affect was normal [Memory Recent] : recent memory was not impaired [Person] : oriented to person [Place] : oriented to place [Time] : oriented to time [Quadriparesis] : quadriparesis affecting all four limbs [Limited Balance] : the patient's balance was impaired [Erythema] : not erythematous [Tender] : not tender [Warm] : not warm [Indurated] : not indurated [Fluctuant] : not fluctuant

## 2023-01-12 NOTE — HISTORY OF PRESENT ILLNESS
[FreeTextEntry1] : Pt is a 61 yo F PMH polio (during childhood, chronic b/l LE weakness), DM, asthma, HLD reported progressively worsening chronic b/l LE pain. Images showed L5-S1 spondylolisthesis and she underwent elective  L5-S1 alek laminectomy & TLIF, L4-S2/AI fusion. However she presents w/ b/l LE pain since surgery. Pt said she had RLE pain since surgery that has worsened. Recently LLE pain became worse, associated w/ RLE numbness and b/l LE tingling with worsened LBP. Pt ambulates w/ walker at baseline. MRI from 12/6 shows CSF fluid collection within the laminectomy/foraminotomy bed of L5-S1 w/ severe spinal canal stenosis and thecal sac compression w/ R >L neural foraminal narrowing (non-enhancing). She was readmitted and underwent exploration and wound debridement on 12/12/2022 with plastic closure (Dr. Jamil). Post op hospital stay was uneventful and she was discharged to home on 12/14.\par \par Today she returns for post op follow up and states her RLE pain has been improving but continues to have intermittently severe paresthesia on R side ankle worsening at night time. Currently taking Percocet/Valium/Lyrica with moderate relief.

## 2023-01-12 NOTE — REASON FOR VISIT
[Spouse] : spouse [de-identified] : L5 orellana laminectomy, L4-S1 posterior fusion, L5-S1 transforaminal lumbar interbody fusion [de-identified] : 10/26/2022 [de-identified] : 13

## 2023-01-12 NOTE — ASSESSMENT
[FreeTextEntry1] : Images were reviewed by Dr. Manzanares today. Stable fusion with mild residual seroma not concerning.\par \par PLAN\par - refer to neurology for neuropathy evaluation\par - EMG LE\par - continue gabapentin/Percocet/Valium\par - RTC in 3 months or sooner for new/worsening symptoms (Xray LS 4 views at that time)

## 2023-01-12 NOTE — DATA REVIEWED
[de-identified] : L-spine w/wo on 1/10/2023 in PACS showed residual non enhancing seroma slightly improving compare to the previous MRI [de-identified] : L-spine AP/Lat on 1/3/2023, F/E today in PACS showed stable interbody/posterior fusion [de-identified] : venous/arterial lower extremities on 1/3/2022 showed no stenosis or blood clots

## 2023-01-16 RX ORDER — OXYCODONE AND ACETAMINOPHEN 5; 325 MG/1; MG/1
5-325 TABLET ORAL
Qty: 20 | Refills: 0 | Status: ACTIVE | COMMUNITY
Start: 2023-01-16 | End: 1900-01-01

## 2023-04-19 ENCOUNTER — RESULT REVIEW (OUTPATIENT)
Age: 63
End: 2023-04-19

## 2023-04-20 ENCOUNTER — OUTPATIENT (OUTPATIENT)
Dept: OUTPATIENT SERVICES | Facility: HOSPITAL | Age: 63
LOS: 1 days | End: 2023-04-20
Payer: MEDICARE

## 2023-04-20 ENCOUNTER — APPOINTMENT (OUTPATIENT)
Dept: SPINE | Facility: CLINIC | Age: 63
End: 2023-04-20
Payer: MEDICARE

## 2023-04-20 VITALS
TEMPERATURE: 96.9 F | RESPIRATION RATE: 17 BRPM | BODY MASS INDEX: 23.18 KG/M2 | SYSTOLIC BLOOD PRESSURE: 106 MMHG | WEIGHT: 115 LBS | OXYGEN SATURATION: 95 % | HEART RATE: 69 BPM | HEIGHT: 59 IN | DIASTOLIC BLOOD PRESSURE: 73 MMHG

## 2023-04-20 VITALS
HEIGHT: 59 IN | OXYGEN SATURATION: 96 % | SYSTOLIC BLOOD PRESSURE: 106 MMHG | BODY MASS INDEX: 23.18 KG/M2 | RESPIRATION RATE: 17 BRPM | HEART RATE: 67 BPM | DIASTOLIC BLOOD PRESSURE: 73 MMHG | TEMPERATURE: 97.3 F | WEIGHT: 115 LBS

## 2023-04-20 DIAGNOSIS — M16.10 UNILATERAL PRIMARY OSTEOARTHRITIS, UNSPECIFIED HIP: ICD-10-CM

## 2023-04-20 DIAGNOSIS — Z98.890 OTHER SPECIFIED POSTPROCEDURAL STATES: Chronic | ICD-10-CM

## 2023-04-20 DIAGNOSIS — M54.16 RADICULOPATHY, LUMBAR REGION: ICD-10-CM

## 2023-04-20 PROCEDURE — 72110 X-RAY EXAM L-2 SPINE 4/>VWS: CPT

## 2023-04-20 PROCEDURE — 72110 X-RAY EXAM L-2 SPINE 4/>VWS: CPT | Mod: 26

## 2023-04-20 PROCEDURE — 99215 OFFICE O/P EST HI 40 MIN: CPT

## 2023-04-24 ENCOUNTER — APPOINTMENT (OUTPATIENT)
Dept: NEUROLOGY | Facility: CLINIC | Age: 63
End: 2023-04-24

## 2023-05-08 ENCOUNTER — APPOINTMENT (OUTPATIENT)
Dept: NEUROLOGY | Facility: CLINIC | Age: 63
End: 2023-05-08

## 2023-05-10 PROBLEM — M54.16 LUMBAR RADICULOPATHY: Status: ACTIVE | Noted: 2022-08-30

## 2023-05-10 NOTE — DISCUSSION/SUMMARY
[de-identified] : She is doing better but continues to have pain and like her to obtain a hip imaging once is complete we will follow-up with her.\par \par Jhon Manzanares M.D., M.Sc.\par \par Department of Neurosurgery\par [facility]\par [facility]\par [facility]\par [place], [place]\par [email or web address]\par [phone]

## 2023-05-10 NOTE — REASON FOR VISIT
[Follow-Up Visit] : a follow-up visit for [Back Pain] : back pain [Radiculopathy] : radiculopathy [Sciatica] : sciatica

## 2023-08-22 ENCOUNTER — RESULT REVIEW (OUTPATIENT)
Age: 63
End: 2023-08-22

## 2023-08-24 ENCOUNTER — OUTPATIENT (OUTPATIENT)
Dept: OUTPATIENT SERVICES | Facility: HOSPITAL | Age: 63
LOS: 1 days | End: 2023-08-24
Payer: MEDICARE

## 2023-08-24 ENCOUNTER — APPOINTMENT (OUTPATIENT)
Dept: SPINE | Facility: CLINIC | Age: 63
End: 2023-08-24
Payer: MEDICARE

## 2023-08-24 VITALS
OXYGEN SATURATION: 98 % | WEIGHT: 120 LBS | DIASTOLIC BLOOD PRESSURE: 67 MMHG | BODY MASS INDEX: 24.19 KG/M2 | SYSTOLIC BLOOD PRESSURE: 94 MMHG | HEART RATE: 62 BPM | TEMPERATURE: 98 F | HEIGHT: 59 IN | RESPIRATION RATE: 18 BRPM

## 2023-08-24 DIAGNOSIS — Z98.890 OTHER SPECIFIED POSTPROCEDURAL STATES: Chronic | ICD-10-CM

## 2023-08-24 PROCEDURE — 72110 X-RAY EXAM L-2 SPINE 4/>VWS: CPT | Mod: 26

## 2023-08-24 PROCEDURE — 72110 X-RAY EXAM L-2 SPINE 4/>VWS: CPT

## 2023-08-24 PROCEDURE — 99215 OFFICE O/P EST HI 40 MIN: CPT

## 2024-02-12 NOTE — PRE-ANESTHESIA EVALUATION ADULT - NSANTHOSAYNRD_GEN_A_CORE
Detail Level: Generalized Detail Level: Detailed Detail Level: Simple No. CARLOS screening performed.  STOP BANG Legend: 0-2 = LOW Risk; 3-4 = INTERMEDIATE Risk; 5-8 = HIGH Risk

## 2024-05-09 NOTE — PATIENT PROFILE ADULT - NS PRO AD ANY ON CHART
----- Message from Lillie Rodriguez sent at 5/7/2024  1:04 PM CDT -----  Regarding: Rhematology Referral   Dr Baum is only excepting internal referrals at this time.   No Yes

## 2025-01-16 RX ORDER — METHOCARBAMOL 500 MG/1
500 TABLET, FILM COATED ORAL 3 TIMES DAILY
Qty: 42 | Refills: 0 | Status: ACTIVE | COMMUNITY
Start: 2025-01-16 | End: 1900-01-01

## 2025-01-17 NOTE — PHYSICAL THERAPY INITIAL EVALUATION ADULT - MANUAL MUSCLE TESTING RESULTS, REHAB EVAL
[Initial] : an initial visit [TextBox_44] : New patient for pulmonary evaluation LLE 3-/5 grossly; LLE >3+/5 upon functional assessment against gravity.

## 2025-01-30 RX ORDER — BLOOD-GLUCOSE SENSOR
EACH MISCELLANEOUS
Qty: 3 | Refills: 5 | Status: ACTIVE | COMMUNITY
Start: 2025-01-30 | End: 1900-01-01

## 2025-01-30 RX ORDER — BLOOD-GLUCOSE,RECEIVER,CONT
EACH MISCELLANEOUS
Qty: 1 | Refills: 0 | Status: ACTIVE | COMMUNITY
Start: 2025-01-30 | End: 1900-01-01

## 2025-02-03 ENCOUNTER — NON-APPOINTMENT (OUTPATIENT)
Age: 65
End: 2025-02-03

## 2025-02-03 DIAGNOSIS — Z86.12 PERSONAL HISTORY OF POLIOMYELITIS: ICD-10-CM

## 2025-02-03 DIAGNOSIS — Z79.4 TYPE 2 DIABETES MELLITUS WITH HYPERGLYCEMIA: ICD-10-CM

## 2025-02-03 DIAGNOSIS — E11.65 TYPE 2 DIABETES MELLITUS WITH HYPERGLYCEMIA: ICD-10-CM

## 2025-02-03 DIAGNOSIS — Z87.39 PERSONAL HISTORY OF OTHER DISEASES OF THE MUSCULOSKELETAL SYSTEM AND CONNECTIVE TISSUE: ICD-10-CM

## 2025-02-03 DIAGNOSIS — I10 ESSENTIAL (PRIMARY) HYPERTENSION: ICD-10-CM

## 2025-02-03 DIAGNOSIS — E78.5 HYPERLIPIDEMIA, UNSPECIFIED: ICD-10-CM

## (undated) DEVICE — MIDAS REX LEGEND BALL FLUTED SM BORE 4.0MM X 10CM

## (undated) DEVICE — ELCTR AQUAMANTYS BIPOLAR SEALER 6.0

## (undated) DEVICE — SUT POLYSORB 3-0 18" P-12 UNDYED

## (undated) DEVICE — STRYKER INSTRUMENT BATTERY

## (undated) DEVICE — WARMING BLANKET LOWER ADULT

## (undated) DEVICE — SUT ETHILON 3-0 18" PS-1

## (undated) DEVICE — DRSG BIOPATCH DISK W CHG 1" W 4.0MM HOLE

## (undated) DEVICE — MARKING PEN W RULER

## (undated) DEVICE — LAP PAD 18 X 18"

## (undated) DEVICE — SUT PDS II 0 27" CT-1

## (undated) DEVICE — DRSG MASTISOL

## (undated) DEVICE — SUT VICRYL PLUS 2-0 18" CT-2 (POP-OFF)

## (undated) DEVICE — DRSG TEGADERM 4X4.75"

## (undated) DEVICE — BIPOLAR CORD AMERICAN BIOSURGICAL 12FT (BLUE)

## (undated) DEVICE — DRAPE 1/2 SHEET 40X57"

## (undated) DEVICE — DRAPE FOR 2 TIER TABLE W/ 8" BACK 72" LONG

## (undated) DEVICE — DRAPE GENERAL ENDOSCOPY

## (undated) DEVICE — ELCTR REM POLYHESIVE ADULT PT RETURN 15FT

## (undated) DEVICE — SOL IRR POUR NS 0.9% 500ML

## (undated) DEVICE — NDL HYPO SAFE 22G X 1.5" (BLACK)

## (undated) DEVICE — DRSG STERISTRIPS 0.5 X 4"

## (undated) DEVICE — SUT POLYSORB 2-0 30" V-20 UNDYED

## (undated) DEVICE — ELCTR BOVIE TIP BLADE INSULATED 4" EDGE

## (undated) DEVICE — DRAIN JACKSON PRATT 10MM FLAT 3/4 NO TROCAR

## (undated) DEVICE — SPONGE PEANUT AUTO COUNT

## (undated) DEVICE — GLV 8 PROTEXIS ORTHO (CREAM)

## (undated) DEVICE — DRAPE INSTRUMENT POUCH 6.75" X 11"

## (undated) DEVICE — DRAPE SURGICAL #1010

## (undated) DEVICE — SUT PLAIN GUT FAST ABSORBING 5-0 PC-1

## (undated) DEVICE — STRYKER BONE MILL BLADE FINE 3.2MM

## (undated) DEVICE — SOL IRR POUR H2O 250ML

## (undated) DEVICE — DRSG DERMABOND 0.7ML

## (undated) DEVICE — SUT VICRYL 0 27" UR-6

## (undated) DEVICE — SUT MONOCRYL 3-0 18" PS-1

## (undated) DEVICE — SUT VICRYL PLUS 0 36" CT-1

## (undated) DEVICE — STRYKER SONOPET IQ TUBING SET

## (undated) DEVICE — MEDICATION LABELS W MARKER

## (undated) DEVICE — SUT POLYSORB 0 36" GS-21 UNDYED

## (undated) DEVICE — DRAIN JACKSON PRATT 3 SPRING RESERVOIR W 10FR PVC DRAIN

## (undated) DEVICE — MIDAS REX LEGEND MATCH HEAD FLUTED SM BORE 3.0MM X 10CM

## (undated) DEVICE — STRYKER SONOPET IQ TIP 10CM BROAD KNIFE

## (undated) DEVICE — STAPLER SKIN PROXIMATE

## (undated) DEVICE — GLV 8.5 PROTEXIS ORTHO (CREAM)

## (undated) DEVICE — DRAPE 3/4 SHEET 52X76"

## (undated) DEVICE — SUT MONOCRYL 4-0 27" PS-2 UNDYED

## (undated) DEVICE — Device

## (undated) DEVICE — POLISHER OR CAUTERY TIP

## (undated) DEVICE — DRAPE LIGHT HANDLE COVER (GREEN)

## (undated) DEVICE — DRAIN RESERVOIR FOR JACKSON PRATT 100CC CARDINAL

## (undated) DEVICE — ELCTR STRYKER NEPTUNE SMOKE EVACUATION PENCIL (GREEN)

## (undated) DEVICE — PACK SPINE

## (undated) DEVICE — BLADE SCALPEL SAFETYLOCK #15

## (undated) DEVICE — SUT SILK 2-0 30" PSL

## (undated) DEVICE — NDL SPINAL 18G X 3.5" (PINK)

## (undated) DEVICE — SPONGE SURGICAL STRIP 1/2 X 6"

## (undated) DEVICE — NDL BIOP BONE MARROW 11G 10CC 15CM

## (undated) DEVICE — SUT PDS II 2-0 27" CT-2

## (undated) DEVICE — FEEDING TUBE ENTERAL KANGAROO CONNECT ENPLUS SPIKE SET

## (undated) DEVICE — DRSG TEGADERM 3.5X6"

## (undated) DEVICE — VENODYNE/SCD SLEEVE CALF MEDIUM

## (undated) DEVICE — PACK MINOR

## (undated) DEVICE — DRAPE TOP SHEET 53" X 101"

## (undated) DEVICE — DRSG DERMABOND PRINEO 22CM

## (undated) DEVICE — PREP CHLORAPREP HI-LITE ORANGE 26ML

## (undated) DEVICE — MIDAS REX LEGEND MATCH HEAD FLUTED LG BORE 3.0MM X 14CM

## (undated) DEVICE — POSITIONER FOAM EGG CRATE ULNAR 2PCS (PINK)

## (undated) DEVICE — GLV 7 PROTEXIS (WHITE)

## (undated) DEVICE — FOLEY TRAY 16FR 5CC LF UMETER CLOSED

## (undated) DEVICE — ELCTR BOVIE TIP BLADE INSULATED 2.75" EDGE

## (undated) DEVICE — GLV 7.5 PROTEXIS (WHITE)

## (undated) DEVICE — SUT MONOCRYL 2-0 27" SH VIOLET

## (undated) DEVICE — SUT STRATAFIX SYMMETRIC PDS PLUS 1 18" CT

## (undated) DEVICE — DRAPE MICROSCOPE EXOSCOPE 12" X 79"

## (undated) DEVICE — SUT MONOCRYL 4-0 18" PS-2

## (undated) DEVICE — DRAPE C ARM C-ARMOUR